# Patient Record
Sex: MALE | Race: WHITE | NOT HISPANIC OR LATINO | Employment: OTHER | URBAN - METROPOLITAN AREA
[De-identification: names, ages, dates, MRNs, and addresses within clinical notes are randomized per-mention and may not be internally consistent; named-entity substitution may affect disease eponyms.]

---

## 2017-03-09 ENCOUNTER — OFFICE VISIT (OUTPATIENT)
Dept: CARDIOLOGY | Facility: CLINIC | Age: 82
End: 2017-03-09
Payer: MEDICARE

## 2017-03-09 VITALS
WEIGHT: 156.31 LBS | HEART RATE: 58 BPM | SYSTOLIC BLOOD PRESSURE: 150 MMHG | HEIGHT: 65 IN | DIASTOLIC BLOOD PRESSURE: 66 MMHG | BODY MASS INDEX: 26.04 KG/M2

## 2017-03-09 DIAGNOSIS — I71.40 ABDOMINAL AORTIC ANEURYSM WITHOUT RUPTURE: ICD-10-CM

## 2017-03-09 DIAGNOSIS — I48.0 PAROXYSMAL ATRIAL FIBRILLATION: ICD-10-CM

## 2017-03-09 DIAGNOSIS — I10 HYPERTENSION, BENIGN: Primary | ICD-10-CM

## 2017-03-09 DIAGNOSIS — E78.2 MIXED HYPERLIPIDEMIA: ICD-10-CM

## 2017-03-09 DIAGNOSIS — I25.10 CORONARY ARTERY DISEASE INVOLVING NATIVE CORONARY ARTERY OF NATIVE HEART WITHOUT ANGINA PECTORIS: ICD-10-CM

## 2017-03-09 PROCEDURE — 1126F AMNT PAIN NOTED NONE PRSNT: CPT | Mod: S$GLB,,, | Performed by: INTERNAL MEDICINE

## 2017-03-09 PROCEDURE — 1159F MED LIST DOCD IN RCRD: CPT | Mod: S$GLB,,, | Performed by: INTERNAL MEDICINE

## 2017-03-09 PROCEDURE — 1157F ADVNC CARE PLAN IN RCRD: CPT | Mod: S$GLB,,, | Performed by: INTERNAL MEDICINE

## 2017-03-09 PROCEDURE — 3078F DIAST BP <80 MM HG: CPT | Mod: S$GLB,,, | Performed by: INTERNAL MEDICINE

## 2017-03-09 PROCEDURE — 3077F SYST BP >= 140 MM HG: CPT | Mod: S$GLB,,, | Performed by: INTERNAL MEDICINE

## 2017-03-09 PROCEDURE — 99999 PR PBB SHADOW E&M-EST. PATIENT-LVL III: CPT | Mod: PBBFAC,,, | Performed by: INTERNAL MEDICINE

## 2017-03-09 PROCEDURE — 1160F RVW MEDS BY RX/DR IN RCRD: CPT | Mod: S$GLB,,, | Performed by: INTERNAL MEDICINE

## 2017-03-09 PROCEDURE — 99213 OFFICE O/P EST LOW 20 MIN: CPT | Mod: S$GLB,,, | Performed by: INTERNAL MEDICINE

## 2017-03-09 PROCEDURE — 99499 UNLISTED E&M SERVICE: CPT | Mod: S$GLB,,, | Performed by: INTERNAL MEDICINE

## 2017-03-09 RX ORDER — LISINOPRIL 10 MG/1
10 TABLET ORAL DAILY
Qty: 30 TABLET | Refills: 5 | Status: SHIPPED | OUTPATIENT
Start: 2017-03-09 | End: 2017-09-09 | Stop reason: SDUPTHER

## 2017-03-11 NOTE — PROGRESS NOTES
Subjective:    Patient ID:  Nando Salguero is a 81 y.o. male who presents for follow-up of Coronary Artery Disease      HPI  Mr. Salguero is a 80 y/o gentleman with a history of HTN, HLP, CAD s/p CABG (LIMA-LAD, SVG-PDA, and occluded SVG-Diag) with multiple stents in his LAD, Diag, LCx, and SVG-PDA. His last PCI was on 5/26/14 and involved a Culotte technique for LM bifurcation ISR. He was last seen in this clinic on 1/7/16. He denies chest pain. 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 Mr. Salguero states he sleeps with one pillow under his head. He denies PND or lower extremity edema. He denies palpitations, syncope, or near syncope. He reports good medicine compliance and adherence to a heart healthy diet. He has not been exercising. In the interim since his last clinic visit he underwent a thryoidectomy. Since his thyroid surgery he reports difficulty swallowing, but no dysphagia and a cough with clear sputum production. He reports that both the cough and difficulty swallowing are resolving. He denies any stridor or hoarsness. He also reports left sided neck pain worse with turning his head to the left today.  He states he woke up with hthis pain2 days ago.  The is resolving slowly, but still severe. No associated symptoms.    Past Medical History:   Diagnosis Date    AAA (abdominal aortic aneurysm)     Arthritis     Cancer     CHF (congestive heart failure)     Chronic back pain     requiring long term pain meds.     Coronary artery disease     Encounter for blood transfusion     Hyperlipidemia     Hypertension     Paroxysmal atrial fibrillation 4/2/2014    Prostate cancer     Thyroid nodule 2/10/2015     Past Surgical History:   Procedure Laterality Date    ABDOMINAL SURGERY      BACK SURGERY      cardiac catherization Left 5/26/15    CARDIAC CATHETERIZATION  2005    CARDIAC CATHETERIZATION  1997    CARDIAC  CATHETERIZATION  1996    CARDIAC CATHETERIZATION  2012    CORONARY ANGIOPLASTY      CORONARY ARTERY BYPASS GRAFT  04/18/1995    x4    JOINT REPLACEMENT      PROSTATE SURGERY      SPINE SURGERY      THYROIDECTOMY  8/14/15     Current Outpatient Prescriptions on File Prior to Visit   Medication Sig Dispense Refill    amlodipine (NORVASC) 10 MG tablet TAKE 1 TABLET ONE TIME DAILY 90 tablet 11    aspirin (ADULT LOW DOSE ASPIRIN) 81 MG EC tablet Take 1 tablet by mouth Daily.      atorvastatin (LIPITOR) 40 MG tablet Take 1 tablet (40 mg total) by mouth once daily. 90 tablet 3    calcium carbonate (OS-GREGORY) 500 mg calcium (1,250 mg) tablet 2 tabs po TID x 1 week, then 2 tabs po BID x 1 week, then 2 tabs po q daily x  1 week, then 1 tab po q day (Patient taking differently: 1 tablet once daily. ) 161 tablet 6    duloxetine (CYMBALTA) 30 MG capsule Take 1 capsule (30 mg total) by mouth once daily. 30 capsule 11    ferrous gluconate (FERGON) 324 MG tablet TAKE ONE TABLET BY MOUTH ONCE DAILY WITH BREAKFAST 30 tablet 0    levothyroxine (SYNTHROID) 125 MCG tablet Take 1 tablet (125 mcg total) by mouth before breakfast. 90 tablet 3    lidocaine-prilocaine (EMLA) cream       lorazepam (ATIVAN) 0.5 MG tablet Take 1 tablet (0.5 mg total) by mouth every 12 (twelve) hours as needed. 60 tablet 2    nitroGLYCERIN (NITROSTAT) 0.4 MG SL tablet Place 1 tablet (0.4 mg total) under the tongue every 5 (five) minutes as needed for Chest pain. 25 tablet 3    oxycodone-acetaminophen (PERCOCET) 5-325 mg per tablet Take 1 tablet by mouth every 6 (six) hours as needed for Pain. 90 tablet 0    ranitidine (ZANTAC) 150 MG tablet Take 1 tablet (150 mg total) by mouth nightly. 90 tablet 11     No current facility-administered medications on file prior to visit.      Review of patient's allergies indicates:   Allergen Reactions    Sulfamethoxazole-trimethoprim      Other reaction(s): BRADYCARDIA     Social History   Substance Use  Topics    Smoking status: Never Smoker    Smokeless tobacco: None    Alcohol use No     Family History   Problem Relation Age of Onset    Cancer Mother     Heart attack Mother     Hypertension Mother     Cancer Father     Heart attack Father     Hypertension Father     Stroke Brother     Cancer Brother     No Known Problems Sister     No Known Problems Daughter     No Known Problems Maternal Grandmother     No Known Problems Maternal Grandfather     No Known Problems Paternal Grandmother     No Known Problems Paternal Grandfather     No Known Problems Maternal Aunt     No Known Problems Maternal Uncle     No Known Problems Paternal Aunt     No Known Problems Paternal Uncle     Anemia Neg Hx     Arrhythmia Neg Hx     Asthma Neg Hx     Clotting disorder Neg Hx     Fainting Neg Hx     Heart disease Neg Hx     Heart failure Neg Hx     Hyperlipidemia Neg Hx     Melanoma Neg Hx            Review of Systems   Constitution: Negative for decreased appetite, diaphoresis, fever, malaise/fatigue, weight gain and weight loss.   HENT: Negative for congestion, nosebleeds and sore throat.    Eyes: Negative for blurred vision, vision loss in left eye, vision loss in right eye and visual disturbance.   Cardiovascular: Negative for chest pain, claudication, dyspnea on exertion, leg swelling, near-syncope, orthopnea, palpitations, paroxysmal nocturnal dyspnea and syncope.   Respiratory: Negative for cough, hemoptysis, shortness of breath and wheezing.    Endocrine: Negative for polyuria.   Hematologic/Lymphatic: Does not bruise/bleed easily.   Skin: Negative for nail changes and rash.   Musculoskeletal: Positive for neck pain and stiffness. Negative for back pain, muscle cramps and myalgias.   Gastrointestinal: Negative for abdominal pain, change in bowel habit, diarrhea, heartburn, hematemesis, hematochezia, melena, nausea and vomiting.   Genitourinary: Negative for bladder incontinence, dysuria,  "frequency and hematuria.   Psychiatric/Behavioral: Negative for depression.   Allergic/Immunologic: Negative for hives.        Objective:  Vitals:    03/09/17 0735 03/09/17 0742   BP: (!) 152/68 (!) 150/66   Pulse: (!) 58    Weight: 70.9 kg (156 lb 4.9 oz)    Height: 5' 5" (1.651 m)          Physical Exam   Constitutional: He is oriented to person, place, and time. He appears well-developed and well-nourished.   HENT:   Head: Normocephalic and atraumatic.   Eyes: EOM are normal. Pupils are equal, round, and reactive to light.   Neck: Neck supple. No JVD present. No thyromegaly present.   Cardiovascular: Normal rate and regular rhythm.  PMI is displaced.  Exam reveals no gallop and no friction rub.    No murmur heard.  Pulses:       Carotid pulses are 2+ on the right side, and 2+ on the left side.       Radial pulses are 2+ on the right side, and 2+ on the left side.        Femoral pulses are 2+ on the right side, and 2+ on the left side.       Dorsalis pedis pulses are 2+ on the right side, and 2+ on the left side.        Posterior tibial pulses are 2+ on the right side, and 2+ on the left side.   Pulmonary/Chest: Effort normal. He has no wheezes. He has no rhonchi. He has no rales.   Abdominal: Soft. Normal appearance. He exhibits no distension. There is no hepatosplenomegaly. There is no tenderness.   Neurological: He is alert and oriented to person, place, and time. Gait normal.   Psychiatric: He has a normal mood and affect.         Assessment:       1. Hypertension, benign    2. Paroxysmal atrial fibrillation    3. Mixed hyperlipidemia    4. Coronary artery disease involving native coronary artery of native heart without angina pectoris    5. Abdominal aortic aneurysm without rupture         Plan:       1. CAD. The patient is free of angina and CHF symptoms. I counseled him on the importance of continuing EC ASA 81mg po qday and his statin.       2. HTN. BP inadequately controlled. Will start lisinopril 10mg po " qday and check BMP in 1 week      3. HLD.6/2/16 lipid profile reviewed. Continue statin; rec heart healthy diet;       4. Afib history. CHADS2 score = 3; patient is followed by Dr. Pablo in [place]. Currently not on anti-coagulation due to GI bleeding on coumadin.       5. AAA. 8/8/16 duplex demonstrated 3.31cm AAA; rec repeat scan in 1 year     6.  Left neck pain. history and exam suggest musculoskeletal origin; rec PCP follow-up if symptoms do not resolve in 2 weeks or get worse    All of the patient's questions were answered.   Follow-up in 6 months

## 2017-03-16 ENCOUNTER — LAB VISIT (OUTPATIENT)
Dept: LAB | Facility: HOSPITAL | Age: 82
End: 2017-03-16
Attending: INTERNAL MEDICINE
Payer: MEDICARE

## 2017-03-16 DIAGNOSIS — I10 HYPERTENSION, BENIGN: ICD-10-CM

## 2017-03-16 LAB
ANION GAP SERPL CALC-SCNC: 10 MMOL/L
BUN SERPL-MCNC: 35 MG/DL
CALCIUM SERPL-MCNC: 9.6 MG/DL
CHLORIDE SERPL-SCNC: 105 MMOL/L
CO2 SERPL-SCNC: 24 MMOL/L
CREAT SERPL-MCNC: 1 MG/DL
EST. GFR  (AFRICAN AMERICAN): >60 ML/MIN/1.73 M^2
EST. GFR  (NON AFRICAN AMERICAN): >60 ML/MIN/1.73 M^2
GLUCOSE SERPL-MCNC: 129 MG/DL
POTASSIUM SERPL-SCNC: 4.3 MMOL/L
SODIUM SERPL-SCNC: 139 MMOL/L

## 2017-03-16 PROCEDURE — 36415 COLL VENOUS BLD VENIPUNCTURE: CPT

## 2017-03-16 PROCEDURE — 80048 BASIC METABOLIC PNL TOTAL CA: CPT

## 2017-04-20 ENCOUNTER — OFFICE VISIT (OUTPATIENT)
Dept: INTERNAL MEDICINE | Facility: CLINIC | Age: 82
End: 2017-04-20
Payer: MEDICARE

## 2017-04-20 VITALS
SYSTOLIC BLOOD PRESSURE: 116 MMHG | WEIGHT: 151.25 LBS | BODY MASS INDEX: 25.2 KG/M2 | HEART RATE: 65 BPM | DIASTOLIC BLOOD PRESSURE: 70 MMHG | HEIGHT: 65 IN

## 2017-04-20 DIAGNOSIS — G95.89 MYELOMALACIA OF CERVICAL CORD: ICD-10-CM

## 2017-04-20 DIAGNOSIS — I20.9 CARDIAC ANGINA: ICD-10-CM

## 2017-04-20 DIAGNOSIS — M48.02 CERVICAL STENOSIS OF SPINE: ICD-10-CM

## 2017-04-20 DIAGNOSIS — M19.90 ARTHRITIS: ICD-10-CM

## 2017-04-20 DIAGNOSIS — M47.812 ARTHROPATHY OF CERVICAL FACET JOINT: ICD-10-CM

## 2017-04-20 DIAGNOSIS — G89.29 CHRONIC LOW BACK PAIN, UNSPECIFIED BACK PAIN LATERALITY, WITH SCIATICA PRESENCE UNSPECIFIED: Primary | ICD-10-CM

## 2017-04-20 DIAGNOSIS — M54.5 CHRONIC LOW BACK PAIN, UNSPECIFIED BACK PAIN LATERALITY, WITH SCIATICA PRESENCE UNSPECIFIED: Primary | ICD-10-CM

## 2017-04-20 PROCEDURE — 3074F SYST BP LT 130 MM HG: CPT | Mod: S$GLB,,, | Performed by: INTERNAL MEDICINE

## 2017-04-20 PROCEDURE — 99213 OFFICE O/P EST LOW 20 MIN: CPT | Mod: S$GLB,,, | Performed by: INTERNAL MEDICINE

## 2017-04-20 PROCEDURE — 1126F AMNT PAIN NOTED NONE PRSNT: CPT | Mod: S$GLB,,, | Performed by: INTERNAL MEDICINE

## 2017-04-20 PROCEDURE — 1159F MED LIST DOCD IN RCRD: CPT | Mod: S$GLB,,, | Performed by: INTERNAL MEDICINE

## 2017-04-20 PROCEDURE — 1157F ADVNC CARE PLAN IN RCRD: CPT | Mod: S$GLB,,, | Performed by: INTERNAL MEDICINE

## 2017-04-20 PROCEDURE — 99999 PR PBB SHADOW E&M-EST. PATIENT-LVL III: CPT | Mod: PBBFAC,,, | Performed by: INTERNAL MEDICINE

## 2017-04-20 PROCEDURE — 1160F RVW MEDS BY RX/DR IN RCRD: CPT | Mod: S$GLB,,, | Performed by: INTERNAL MEDICINE

## 2017-04-20 PROCEDURE — 3078F DIAST BP <80 MM HG: CPT | Mod: S$GLB,,, | Performed by: INTERNAL MEDICINE

## 2017-04-20 PROCEDURE — 99499 UNLISTED E&M SERVICE: CPT | Mod: S$GLB,,, | Performed by: INTERNAL MEDICINE

## 2017-04-20 RX ORDER — OXYCODONE AND ACETAMINOPHEN 5; 325 MG/1; MG/1
1 TABLET ORAL EVERY 6 HOURS PRN
Qty: 90 TABLET | Refills: 0 | Status: SHIPPED | OUTPATIENT
Start: 2017-04-20 | End: 2017-06-20 | Stop reason: SDUPTHER

## 2017-04-20 NOTE — MR AVS SNAPSHOT
[facility]  [address]  [facility] [address]  Phone: [phone]  Fax: [phone]                  Nando Salguero   2017 11:00 AM   Office Visit    Description:  Male : 1935   Provider:  Rogelio Oliver MD   Department:  [facility]           Reason for Visit     Medication Refill           Diagnoses this Visit        Comments    Chronic low back pain, unspecified back pain laterality, with sciatica presence unspecified    -  Primary     Arthropathy of cervical facet joint         Arthritis         Cervical stenosis of spine         Cardiac angina     No recent episodes. Saw Cardiology recently. Meds adjusted.     Myelomalacia of cervical cord     Continue meds. Clinically stable.            To Do List           Future Appointments        Provider Department Dept Phone    2017 11:15 AM Rogelio Oliver MD [facility] [phone]      Goals (5 Years of Data)     None       These Medications        Disp Refills Start End    oxycodone-acetaminophen (PERCOCET) 5-325 mg per tablet 90 tablet 0 2017     Take 1 tablet by mouth every 6 (six) hours as needed for Pain. - Oral    Pharmacy: [facility] [address] [place] [address] Ph #: [phone]         [facility] On Call     [facility] On Call Nurse Care Line - 24 Assistance  Unless otherwise directed by your provider, please contact [facility], our nurse care line that is available for / assistance.     Registered nurses in the [facility] Center provide: appointment scheduling, clinical advisement, health education, and other advisory services.  Call: [phone] (toll free)               Medications           Message regarding Medications     Verify the changes and/or additions to your medication regime listed below are the same as discussed with your clinician today.  If any of these changes or additions are incorrect, please  "notify your healthcare provider.        STOP taking these medications     lorazepam (ATIVAN) 0.5 MG tablet Take 1 tablet (0.5 mg total) by mouth every 12 (twelve) hours as needed.           Verify that the below list of medications is an accurate representation of the medications you are currently taking.  If none reported, the list may be blank. If incorrect, please contact your healthcare provider. Carry this list with you in case of emergency.           Current Medications     amlodipine (NORVASC) 10 MG tablet TAKE 1 TABLET ONE TIME DAILY    aspirin (ADULT LOW DOSE ASPIRIN) 81 MG EC tablet Take 1 tablet by mouth Daily.    atorvastatin (LIPITOR) 40 MG tablet Take 1 tablet (40 mg total) by mouth once daily.    calcium carbonate (OS-GREGORY) 500 mg calcium (1,250 mg) tablet 2 tabs po TID x 1 week, then 2 tabs po BID x 1 week, then 2 tabs po q daily x  1 week, then 1 tab po q day    duloxetine (CYMBALTA) 30 MG capsule Take 1 capsule (30 mg total) by mouth once daily.    ferrous gluconate (FERGON) 324 MG tablet TAKE ONE TABLET BY MOUTH ONCE DAILY WITH BREAKFAST    levothyroxine (SYNTHROID) 125 MCG tablet Take 1 tablet (125 mcg total) by mouth before breakfast.    lidocaine-prilocaine (EMLA) cream     lisinopril 10 MG tablet Take 1 tablet (10 mg total) by mouth once daily.    nitroGLYCERIN (NITROSTAT) 0.4 MG SL tablet Place 1 tablet (0.4 mg total) under the tongue every 5 (five) minutes as needed for Chest pain.    oxycodone-acetaminophen (PERCOCET) 5-325 mg per tablet Take 1 tablet by mouth every 6 (six) hours as needed for Pain.    ranitidine (ZANTAC) 150 MG tablet Take 1 tablet (150 mg total) by mouth nightly.           Clinical Reference Information           Your Vitals Were     BP Pulse Height Weight BMI    116/70 65 5' 5" (1.651 m) 68.6 kg (151 lb 3.8 oz) 25.17 kg/m2      Blood Pressure          Most Recent Value    BP  116/70      Allergies as of 4/20/2017     Sulfamethoxazole-trimethoprim      Immunizations " Administered on Date of Encounter - 4/20/2017     None      Language Assistance Services     ATTENTION: Language assistance services are available, free of charge. Please call [phone].      ATENCIÓN: Si habla viviana, tiene a mora disposición servicios gratuitos de asistencia lingüística. Llame al [phone].     CHÚ Ý: N?u b?n nói Ti?ng Vi?t, có các d?ch v? h? tr? ngôn ng? mi?n phí dành cho b?n. G?i s? [phone].         [facility] complies with applicable Federal civil rights laws and does not discriminate on the basis of race, color, national origin, age, disability, or sex.

## 2017-04-20 NOTE — PROGRESS NOTES
Subjective:       Patient ID: Nando Salguero is a 82 y.o. male.    Chief Complaint: Medication Refill    HPI Comments: Patient with significant back and neck arthritis and chronic pain comes in for follow-up of pain meds.  He actually came on the wrong day but was hoping for refill.  His last prescription was in January.  He denies any chest pain shortness of breath fevers or chills.  He sees cardiology for heart disease.  I will update his Percocet prescription.  He says he has been taking about one per day.  Against side effects discussed and risk to completed.    Medication Refill   Associated symptoms include neck pain. Pertinent negatives include no abdominal pain, chest pain, chills, coughing, fatigue, fever, headaches, nausea, rash or vomiting.     Review of Systems   Constitutional: Negative for chills, fatigue, fever and unexpected weight change.   HENT: Negative for trouble swallowing.    Eyes: Negative for visual disturbance.   Respiratory: Negative for cough, shortness of breath and wheezing.    Cardiovascular: Negative for chest pain and palpitations.   Gastrointestinal: Negative for abdominal pain, constipation, diarrhea, nausea and vomiting.   Genitourinary: Negative for difficulty urinating.   Musculoskeletal: Positive for back pain, neck pain and neck stiffness.   Skin: Negative for rash.   Neurological: Negative for dizziness and headaches.       Objective:      Physical Exam   Constitutional: He is oriented to person, place, and time. He appears well-developed and well-nourished. No distress.   HENT:   Head: Normocephalic and atraumatic.   Mouth/Throat: No oropharyngeal exudate.   TM's clear, pharynx clear   Eyes: Conjunctivae and EOM are normal. Pupils are equal, round, and reactive to light. No scleral icterus.   Neck: No thyromegaly present.   No supraclavicular nodes palpated   Cardiovascular: Normal rate, regular rhythm and normal heart sounds.    No murmur heard.  Pulmonary/Chest: Effort  normal and breath sounds normal. He has no wheezes.   Abdominal: Soft. Bowel sounds are normal. He exhibits no mass. There is no tenderness.   Musculoskeletal: He exhibits tenderness (neck and low back). He exhibits no edema.   Lymphadenopathy:     He has no cervical adenopathy.   Neurological: He is alert and oriented to person, place, and time.   Skin: No pallor.   Psychiatric: He has a normal mood and affect.       Assessment:       1. Chronic low back pain, unspecified back pain laterality, with sciatica presence unspecified    2. Arthropathy of cervical facet joint    3. Arthritis    4. Cervical stenosis of spine    5. Cardiac angina    6. Myelomalacia of cervical cord        Plan:       Nando was seen today for medication refill.    Diagnoses and all orders for this visit:    Chronic low back pain, unspecified back pain laterality, with sciatica presence unspecified    Arthropathy of cervical facet joint    Arthritis    Cervical stenosis of spine    Cardiac angina  Comments:  No recent episodes. Saw Cardiology recently. Meds adjusted.     Myelomalacia of cervical cord  Comments:  Continue meds. Clinically stable.     Other orders  -     oxycodone-acetaminophen (PERCOCET) 5-325 mg per tablet; Take 1 tablet by mouth every 6 (six) hours as needed for Pain.

## 2017-05-18 ENCOUNTER — TELEPHONE (OUTPATIENT)
Dept: INTERNAL MEDICINE | Facility: CLINIC | Age: 82
End: 2017-05-18

## 2017-05-18 DIAGNOSIS — I10 HYPERTENSION, BENIGN: Primary | ICD-10-CM

## 2017-05-18 DIAGNOSIS — E78.2 MIXED HYPERLIPIDEMIA: ICD-10-CM

## 2017-05-18 DIAGNOSIS — M19.90 ARTHRITIS: ICD-10-CM

## 2017-05-18 DIAGNOSIS — E89.0 POSTOPERATIVE HYPOTHYROIDISM: ICD-10-CM

## 2017-05-18 DIAGNOSIS — I25.10 CORONARY ARTERY DISEASE INVOLVING NATIVE CORONARY ARTERY OF NATIVE HEART WITHOUT ANGINA PECTORIS: ICD-10-CM

## 2017-06-05 RX ORDER — LEVOTHYROXINE SODIUM 125 UG/1
TABLET ORAL
Qty: 90 TABLET | Refills: 0 | Status: SHIPPED | OUTPATIENT
Start: 2017-06-05 | End: 2017-09-08 | Stop reason: SDUPTHER

## 2017-06-14 ENCOUNTER — LAB VISIT (OUTPATIENT)
Dept: LAB | Facility: HOSPITAL | Age: 82
End: 2017-06-14
Attending: INTERNAL MEDICINE
Payer: MEDICARE

## 2017-06-14 DIAGNOSIS — I25.10 CORONARY ARTERY DISEASE INVOLVING NATIVE CORONARY ARTERY OF NATIVE HEART WITHOUT ANGINA PECTORIS: ICD-10-CM

## 2017-06-14 DIAGNOSIS — E89.0 POSTOPERATIVE HYPOTHYROIDISM: ICD-10-CM

## 2017-06-14 DIAGNOSIS — M19.90 ARTHRITIS: ICD-10-CM

## 2017-06-14 DIAGNOSIS — I10 HYPERTENSION, BENIGN: ICD-10-CM

## 2017-06-14 DIAGNOSIS — E78.2 MIXED HYPERLIPIDEMIA: ICD-10-CM

## 2017-06-14 LAB
ALBUMIN SERPL BCP-MCNC: 3.6 G/DL
ALP SERPL-CCNC: 86 U/L
ALT SERPL W/O P-5'-P-CCNC: 11 U/L
ANION GAP SERPL CALC-SCNC: 6 MMOL/L
AST SERPL-CCNC: 14 U/L
BASOPHILS # BLD AUTO: 0.03 K/UL
BASOPHILS NFR BLD: 0.4 %
BILIRUB SERPL-MCNC: 0.7 MG/DL
BUN SERPL-MCNC: 24 MG/DL
CALCIUM SERPL-MCNC: 9.7 MG/DL
CHLORIDE SERPL-SCNC: 105 MMOL/L
CHOLEST/HDLC SERPL: 3.2 {RATIO}
CO2 SERPL-SCNC: 29 MMOL/L
CREAT SERPL-MCNC: 0.9 MG/DL
DIFFERENTIAL METHOD: ABNORMAL
EOSINOPHIL # BLD AUTO: 0.2 K/UL
EOSINOPHIL NFR BLD: 3 %
ERYTHROCYTE [DISTWIDTH] IN BLOOD BY AUTOMATED COUNT: 14.4 %
EST. GFR  (AFRICAN AMERICAN): >60 ML/MIN/1.73 M^2
EST. GFR  (NON AFRICAN AMERICAN): >60 ML/MIN/1.73 M^2
GLUCOSE SERPL-MCNC: 107 MG/DL
HCT VFR BLD AUTO: 38 %
HDL/CHOLESTEROL RATIO: 31.3 %
HDLC SERPL-MCNC: 30 MG/DL
HDLC SERPL-MCNC: 96 MG/DL
HGB BLD-MCNC: 12.1 G/DL
LDLC SERPL CALC-MCNC: 56.4 MG/DL
LYMPHOCYTES # BLD AUTO: 1.5 K/UL
LYMPHOCYTES NFR BLD: 20 %
MCH RBC QN AUTO: 27.9 PG
MCHC RBC AUTO-ENTMCNC: 31.8 %
MCV RBC AUTO: 88 FL
MONOCYTES # BLD AUTO: 0.9 K/UL
MONOCYTES NFR BLD: 12 %
NEUTROPHILS # BLD AUTO: 4.9 K/UL
NEUTROPHILS NFR BLD: 64.6 %
NONHDLC SERPL-MCNC: 66 MG/DL
PLATELET # BLD AUTO: 226 K/UL
PMV BLD AUTO: 10.2 FL
POTASSIUM SERPL-SCNC: 5.2 MMOL/L
PROT SERPL-MCNC: 6.9 G/DL
RBC # BLD AUTO: 4.33 M/UL
SODIUM SERPL-SCNC: 140 MMOL/L
TRIGL SERPL-MCNC: 48 MG/DL
TSH SERPL DL<=0.005 MIU/L-ACNC: 2.5 UIU/ML
WBC # BLD AUTO: 7.64 K/UL

## 2017-06-14 PROCEDURE — 84443 ASSAY THYROID STIM HORMONE: CPT

## 2017-06-14 PROCEDURE — 80061 LIPID PANEL: CPT

## 2017-06-14 PROCEDURE — 80053 COMPREHEN METABOLIC PANEL: CPT

## 2017-06-14 PROCEDURE — 36415 COLL VENOUS BLD VENIPUNCTURE: CPT

## 2017-06-14 PROCEDURE — 85025 COMPLETE CBC W/AUTO DIFF WBC: CPT

## 2017-06-20 ENCOUNTER — OFFICE VISIT (OUTPATIENT)
Dept: INTERNAL MEDICINE | Facility: CLINIC | Age: 82
End: 2017-06-20
Payer: MEDICARE

## 2017-06-20 VITALS
HEART RATE: 74 BPM | WEIGHT: 150.38 LBS | HEIGHT: 65 IN | SYSTOLIC BLOOD PRESSURE: 114 MMHG | BODY MASS INDEX: 25.05 KG/M2 | DIASTOLIC BLOOD PRESSURE: 70 MMHG

## 2017-06-20 DIAGNOSIS — M48.02 CERVICAL STENOSIS OF SPINE: ICD-10-CM

## 2017-06-20 DIAGNOSIS — G89.29 CHRONIC LOW BACK PAIN, UNSPECIFIED BACK PAIN LATERALITY, WITH SCIATICA PRESENCE UNSPECIFIED: ICD-10-CM

## 2017-06-20 DIAGNOSIS — M54.5 CHRONIC LOW BACK PAIN, UNSPECIFIED BACK PAIN LATERALITY, WITH SCIATICA PRESENCE UNSPECIFIED: ICD-10-CM

## 2017-06-20 DIAGNOSIS — Z00.00 ROUTINE PHYSICAL EXAMINATION: Primary | ICD-10-CM

## 2017-06-20 DIAGNOSIS — Z23 NEED FOR PROPHYLACTIC VACCINATION WITH STREPTOCOCCUS PNEUMONIAE (PNEUMOCOCCUS) AND INFLUENZA VACCINES: ICD-10-CM

## 2017-06-20 DIAGNOSIS — M47.812 ARTHROPATHY OF CERVICAL FACET JOINT: ICD-10-CM

## 2017-06-20 PROCEDURE — 90732 PPSV23 VACC 2 YRS+ SUBQ/IM: CPT | Mod: S$GLB,,, | Performed by: INTERNAL MEDICINE

## 2017-06-20 PROCEDURE — 99397 PER PM REEVAL EST PAT 65+ YR: CPT | Mod: S$GLB,,, | Performed by: INTERNAL MEDICINE

## 2017-06-20 PROCEDURE — 99999 PR PBB SHADOW E&M-EST. PATIENT-LVL III: CPT | Mod: PBBFAC,,, | Performed by: INTERNAL MEDICINE

## 2017-06-20 PROCEDURE — G0009 ADMIN PNEUMOCOCCAL VACCINE: HCPCS | Mod: S$GLB,,, | Performed by: INTERNAL MEDICINE

## 2017-06-20 PROCEDURE — 99499 UNLISTED E&M SERVICE: CPT | Mod: S$GLB,,, | Performed by: INTERNAL MEDICINE

## 2017-06-20 RX ORDER — OXYCODONE AND ACETAMINOPHEN 5; 325 MG/1; MG/1
1 TABLET ORAL EVERY 6 HOURS PRN
Qty: 90 TABLET | Refills: 0 | Status: SHIPPED | OUTPATIENT
Start: 2017-06-20 | End: 2017-06-20 | Stop reason: SDUPTHER

## 2017-06-20 RX ORDER — OXYCODONE AND ACETAMINOPHEN 5; 325 MG/1; MG/1
1 TABLET ORAL EVERY 6 HOURS PRN
Qty: 90 TABLET | Refills: 0 | Status: SHIPPED | OUTPATIENT
Start: 2017-07-20 | End: 2017-11-09 | Stop reason: SDUPTHER

## 2017-06-20 NOTE — PROGRESS NOTES
Subjective:       Patient ID: Nando Salguero is a 82 y.o. male.    Chief Complaint: Annual Exam    History of present illness: Patient comes in for annual exam and refill of chronic pain meds.  Patient has significant spine disease including neck and low back.  He has been on pain meds for years.  He denies any side effects, habit-forming nature of this last medication and need for monitoring.  He expressed understanding.  Labs were reviewed in detail with the patient and he expressed understanding about the results.  I would like to update a pneumonia vaccine.  We talked about the need for regular exercise to fully improve the chronic pain in the neck back and extremities.  He says it is tough to do them at his age.      Review of Systems   Constitutional: Negative for chills, fatigue, fever and unexpected weight change.   HENT: Negative for trouble swallowing.    Eyes: Negative for visual disturbance.   Respiratory: Negative for cough, shortness of breath and wheezing.    Cardiovascular: Negative for chest pain and palpitations.   Gastrointestinal: Negative for abdominal pain, constipation, diarrhea, nausea and vomiting.   Genitourinary: Negative for difficulty urinating.   Musculoskeletal: Positive for back pain, neck pain and neck stiffness.   Skin: Negative for rash.   Neurological: Negative for dizziness and headaches.       Objective:      Physical Exam   Constitutional: He is oriented to person, place, and time. He appears well-developed and well-nourished. No distress.   HENT:   Head: Normocephalic and atraumatic.   Right Ear: External ear normal.   Left Ear: External ear normal.   Mouth/Throat: Oropharynx is clear and moist. No oropharyngeal exudate.   TM's clear, pharynx clear   Eyes: Conjunctivae and EOM are normal. Pupils are equal, round, and reactive to light. No scleral icterus.   Neck: Normal range of motion. Neck supple. No thyromegaly present.   No supraclavicular nodes palpated    Cardiovascular: Normal rate, regular rhythm and normal heart sounds.    No murmur heard.  Pulmonary/Chest: Effort normal and breath sounds normal. He has no wheezes.   Abdominal: Soft. Bowel sounds are normal. He exhibits no mass. There is no tenderness.   Musculoskeletal: He exhibits tenderness and deformity (hand arthritis). He exhibits no edema.   Lymphadenopathy:     He has no cervical adenopathy.   Neurological: He is alert and oriented to person, place, and time.   Skin: No pallor.   Psychiatric: He has a normal mood and affect.       Assessment:       1. Routine physical examination    2. Arthropathy of cervical facet joint    3. Cervical stenosis of spine    4. Chronic low back pain, unspecified back pain laterality, with sciatica presence unspecified    5. Need for prophylactic vaccination with Streptococcus pneumoniae (Pneumococcus) and Influenza vaccines        Plan:       Nando was seen today for annual exam.    Diagnoses and all orders for this visit:    Routine physical examination    Arthropathy of cervical facet joint    Cervical stenosis of spine    Chronic low back pain, unspecified back pain laterality, with sciatica presence unspecified    Need for prophylactic vaccination with Streptococcus pneumoniae (Pneumococcus) and Influenza vaccines  -     Pneumococcal Polysaccharide Vaccine (23 Valent) (SQ/IM)    Other orders  -     Discontinue: oxycodone-acetaminophen (PERCOCET) 5-325 mg per tablet; Take 1 tablet by mouth every 6 (six) hours as needed for Pain.  -     oxycodone-acetaminophen (PERCOCET) 5-325 mg per tablet; Take 1 tablet by mouth every 6 (six) hours as needed for Pain.

## 2017-08-17 ENCOUNTER — TELEPHONE (OUTPATIENT)
Dept: INTERNAL MEDICINE | Facility: CLINIC | Age: 82
End: 2017-08-17

## 2017-09-06 ENCOUNTER — TELEPHONE (OUTPATIENT)
Dept: INTERNAL MEDICINE | Facility: CLINIC | Age: 82
End: 2017-09-06

## 2017-09-08 ENCOUNTER — LAB VISIT (OUTPATIENT)
Dept: LAB | Facility: HOSPITAL | Age: 82
End: 2017-09-08
Attending: INTERNAL MEDICINE
Payer: MEDICARE

## 2017-09-08 ENCOUNTER — OFFICE VISIT (OUTPATIENT)
Dept: INTERNAL MEDICINE | Facility: CLINIC | Age: 82
End: 2017-09-08
Payer: MEDICARE

## 2017-09-08 VITALS
HEART RATE: 89 BPM | WEIGHT: 148.13 LBS | SYSTOLIC BLOOD PRESSURE: 124 MMHG | HEIGHT: 65 IN | BODY MASS INDEX: 24.68 KG/M2 | DIASTOLIC BLOOD PRESSURE: 72 MMHG

## 2017-09-08 DIAGNOSIS — H26.9 CATARACT, UNSPECIFIED CATARACT TYPE, UNSPECIFIED LATERALITY: ICD-10-CM

## 2017-09-08 DIAGNOSIS — Z01.810 PREOP CARDIOVASCULAR EXAM: Primary | ICD-10-CM

## 2017-09-08 DIAGNOSIS — I10 HYPERTENSION, BENIGN: ICD-10-CM

## 2017-09-08 DIAGNOSIS — I25.10 CORONARY ARTERY DISEASE INVOLVING NATIVE CORONARY ARTERY OF NATIVE HEART WITHOUT ANGINA PECTORIS: ICD-10-CM

## 2017-09-08 DIAGNOSIS — E89.0 POSTOPERATIVE HYPOTHYROIDISM: ICD-10-CM

## 2017-09-08 LAB
ANION GAP SERPL CALC-SCNC: 11 MMOL/L
BUN SERPL-MCNC: 21 MG/DL
CALCIUM SERPL-MCNC: 9.7 MG/DL
CHLORIDE SERPL-SCNC: 105 MMOL/L
CO2 SERPL-SCNC: 25 MMOL/L
CREAT SERPL-MCNC: 0.9 MG/DL
EST. GFR  (AFRICAN AMERICAN): >60 ML/MIN/1.73 M^2
EST. GFR  (NON AFRICAN AMERICAN): >60 ML/MIN/1.73 M^2
GLUCOSE SERPL-MCNC: 98 MG/DL
POTASSIUM SERPL-SCNC: 4.6 MMOL/L
SODIUM SERPL-SCNC: 141 MMOL/L

## 2017-09-08 PROCEDURE — 1159F MED LIST DOCD IN RCRD: CPT | Mod: S$GLB,,, | Performed by: INTERNAL MEDICINE

## 2017-09-08 PROCEDURE — 99999 PR PBB SHADOW E&M-EST. PATIENT-LVL III: CPT | Mod: PBBFAC,,, | Performed by: INTERNAL MEDICINE

## 2017-09-08 PROCEDURE — 3078F DIAST BP <80 MM HG: CPT | Mod: S$GLB,,, | Performed by: INTERNAL MEDICINE

## 2017-09-08 PROCEDURE — 80048 BASIC METABOLIC PNL TOTAL CA: CPT

## 2017-09-08 PROCEDURE — 3074F SYST BP LT 130 MM HG: CPT | Mod: S$GLB,,, | Performed by: INTERNAL MEDICINE

## 2017-09-08 PROCEDURE — 99214 OFFICE O/P EST MOD 30 MIN: CPT | Mod: S$GLB,,, | Performed by: INTERNAL MEDICINE

## 2017-09-08 PROCEDURE — 99499 UNLISTED E&M SERVICE: CPT | Mod: S$GLB,,, | Performed by: INTERNAL MEDICINE

## 2017-09-08 PROCEDURE — 1126F AMNT PAIN NOTED NONE PRSNT: CPT | Mod: S$GLB,,, | Performed by: INTERNAL MEDICINE

## 2017-09-08 PROCEDURE — 36415 COLL VENOUS BLD VENIPUNCTURE: CPT

## 2017-09-08 PROCEDURE — 3008F BODY MASS INDEX DOCD: CPT | Mod: S$GLB,,, | Performed by: INTERNAL MEDICINE

## 2017-09-08 RX ORDER — AMLODIPINE BESYLATE 10 MG/1
TABLET ORAL
Qty: 90 TABLET | Refills: 11 | Status: SHIPPED | OUTPATIENT
Start: 2017-09-08 | End: 2017-11-29 | Stop reason: SDUPTHER

## 2017-09-08 RX ORDER — ATORVASTATIN CALCIUM 40 MG/1
40 TABLET, FILM COATED ORAL DAILY
Qty: 90 TABLET | Refills: 3 | Status: SHIPPED | OUTPATIENT
Start: 2017-09-08 | End: 2018-09-08

## 2017-09-08 RX ORDER — DULOXETIN HYDROCHLORIDE 30 MG/1
30 CAPSULE, DELAYED RELEASE ORAL DAILY
Qty: 30 CAPSULE | Refills: 11 | Status: SHIPPED | OUTPATIENT
Start: 2017-09-08 | End: 2018-10-11 | Stop reason: SDUPTHER

## 2017-09-08 RX ORDER — LEVOTHYROXINE SODIUM 125 UG/1
TABLET ORAL
Qty: 90 TABLET | Refills: 4 | Status: SHIPPED | OUTPATIENT
Start: 2017-09-08 | End: 2018-09-13 | Stop reason: SDUPTHER

## 2017-09-08 NOTE — PROGRESS NOTES
Subjective:       Patient ID: Nando Salguero is a 82 y.o. male.    Chief Complaint: Pre-op Exam    Patient with history of hypertension, cervical spinal stenosis, hypothyroidism, chronic back and neck pain comes in for follow-up for pre-op consultation in preparation for eye surgery he suspects he will need to get both eyes done.  He needed a few prescriptions updated.  Told he did not have to hold his aspirin.  He denies any abnormal bleeding bruising or clotting.  He has not had problems with anesthesia in the past.  No troubles urinating.  He denies any GI or  complaints.      Review of Systems   Constitutional: Negative for chills, fatigue, fever and unexpected weight change.   HENT: Negative for trouble swallowing.    Eyes: Positive for visual disturbance.   Respiratory: Negative for cough, shortness of breath and wheezing.    Cardiovascular: Negative for chest pain and palpitations.   Gastrointestinal: Negative for abdominal pain, constipation, diarrhea, nausea and vomiting.   Genitourinary: Negative for difficulty urinating.   Musculoskeletal: Positive for arthralgias, back pain, neck pain and neck stiffness.   Skin: Negative for rash.   Neurological: Negative for dizziness and headaches.       Objective:      Physical Exam   Constitutional: He is oriented to person, place, and time. He appears well-developed and well-nourished. No distress.   HENT:   Head: Normocephalic and atraumatic.   Right Ear: External ear normal.   Left Ear: External ear normal.   Mouth/Throat: Oropharynx is clear and moist. No oropharyngeal exudate.   TM's clear, pharynx clear   Eyes: Conjunctivae and EOM are normal. Pupils are equal, round, and reactive to light. No scleral icterus.   Neck: Neck supple. No thyromegaly present.   No supraclavicular nodes palpated   Cardiovascular: Normal rate, regular rhythm and normal heart sounds.    No murmur heard.  Pulmonary/Chest: Effort normal and breath sounds normal. He has no wheezes.    Abdominal: Soft. Bowel sounds are normal. He exhibits no mass. There is no tenderness.   Musculoskeletal: He exhibits tenderness (Neck, low back). He exhibits no edema.   Lymphadenopathy:     He has no cervical adenopathy.   Neurological: He is alert and oriented to person, place, and time.   Skin: No pallor.   Psychiatric: He has a normal mood and affect.       Assessment:       1. Preop cardiovascular exam    2. Hypertension, benign    3. Coronary artery disease involving native coronary artery of native heart without angina pectoris    4. Postoperative hypothyroidism    5. Cataract, unspecified cataract type, unspecified laterality        Plan:       Nando was seen today for pre-op exam.    Diagnoses and all orders for this visit:    Preop cardiovascular exam    Hypertension, benign  -     Basic metabolic panel; Future    Coronary artery disease involving native coronary artery of native heart without angina pectoris    Postoperative hypothyroidism    Cataract, unspecified cataract type, unspecified laterality    Other orders  -     levothyroxine (SYNTHROID) 125 MCG tablet; TAKE ONE TABLET BY MOUTH IN THE MORNING BEFORE BREAKFAST  -     duloxetine (CYMBALTA) 30 MG capsule; Take 1 capsule (30 mg total) by mouth once daily.  -     atorvastatin (LIPITOR) 40 MG tablet; Take 1 tablet (40 mg total) by mouth once daily.  -     amlodipine (NORVASC) 10 MG tablet; TAKE 1 TABLET ONE TIME DAILY        Since his last potassium was borderline and I would like to repeat that before giving final clearance.  As soon as I get his lab I will send a completed preop form.

## 2017-09-09 DIAGNOSIS — I10 HYPERTENSION, BENIGN: ICD-10-CM

## 2017-09-11 RX ORDER — LISINOPRIL 10 MG/1
TABLET ORAL
Qty: 30 TABLET | Refills: 5 | Status: SHIPPED | OUTPATIENT
Start: 2017-09-11 | End: 2018-10-11 | Stop reason: SDUPTHER

## 2017-10-02 ENCOUNTER — TELEPHONE (OUTPATIENT)
Dept: INTERNAL MEDICINE | Facility: CLINIC | Age: 82
End: 2017-10-02

## 2017-10-02 NOTE — TELEPHONE ENCOUNTER
----- Message from Teodora Huffman sent at 9/29/2017  9:40 AM CDT -----  Contact: [facility] [phone] [place]   They need to know if patient has had a recent Chest xray and Ekg? Patient is in the office.

## 2017-10-03 ENCOUNTER — TELEPHONE (OUTPATIENT)
Dept: CARDIOLOGY | Facility: CLINIC | Age: 82
End: 2017-10-03

## 2017-10-03 ENCOUNTER — DOCUMENTATION ONLY (OUTPATIENT)
Dept: CARDIOLOGY | Facility: CLINIC | Age: 82
End: 2017-10-03

## 2017-10-03 NOTE — PROGRESS NOTES
Mr. Salguero is an 82 year old gentleman with a history of HTN, hyperlipidemia, CAD s/p CABG (LIMA-LAD, SVG-PDA, and occluded SVG-Diag) with multiple stents in his LAD, Diag, LCx, and SVG-PDA. His last PCI was on 5/26/14 and involved a Culotte technique for LM bifurcation in-stent restenosis. He was last seen in this clinic on 3/9/17. At that time he denied angina and heart failure symptoms.  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  If he is currently asymptomatic when performing this level of activity, then he will be in the low to moderate risk for cardiac complications during cataract surgery and may proceed with cataract surgery without further cardiac testing. I have attempted to call the patient twice today to assess his symptoms, but the calls went to voicemail. Will try again tomorrow.

## 2017-10-04 ENCOUNTER — DOCUMENTATION ONLY (OUTPATIENT)
Dept: CARDIOLOGY | Facility: CLINIC | Age: 82
End: 2017-10-04

## 2017-10-04 NOTE — PROGRESS NOTES
Mr. Salguero is an 82 year old gentleman with a history of HTN, hyperlipidemia, CAD s/p CABG (LIMA-LAD, SVG-PDA, and occluded SVG-Diag) with multiple stents in his LAD, Diag, LCx, and SVG-PDA. His last PCI was on 5/26/14 and involved a Culotte technique for LM bifurcation in-stent restenosis. He was last seen in this clinic on 3/9/17. At that time he denied angina and heart failure symptoms.  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 I spoek to the patient via telephone today.  He reported that he has been walking 1 mile for exercise approximately one a week without symptoms. 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  He denies lower extremity edema, orthopnea, or PND. He denied palpitations, syncope or near syncope.  His EKG from [facility] dated 9/29/17 demonstrates atrial fibrillation with a ventricular rate of 87 bpm. He has a known hisotry of paroxysmal atrial fibrillation, but has not been on coumadin due to Gi bleeding in the past.    Mr. Salguero is in the low to moderate risk for cardiac complications during cataract surgery and may proceed with cataract surgery without further cardiac testing.  All of the patient's questions were answered.

## 2017-10-09 RX ORDER — ATORVASTATIN CALCIUM 40 MG/1
TABLET, FILM COATED ORAL
Qty: 90 TABLET | Refills: 3 | Status: SHIPPED | OUTPATIENT
Start: 2017-10-09 | End: 2018-10-11 | Stop reason: SDUPTHER

## 2017-11-09 RX ORDER — OXYCODONE AND ACETAMINOPHEN 5; 325 MG/1; MG/1
1 TABLET ORAL EVERY 6 HOURS PRN
Qty: 90 TABLET | Refills: 0 | Status: SHIPPED | OUTPATIENT
Start: 2017-11-09 | End: 2018-02-22 | Stop reason: SDUPTHER

## 2017-11-09 NOTE — TELEPHONE ENCOUNTER
----- Message from Nadiya Luong sent at 11/9/2017  4:14 PM CST -----  Contact: Self  Pt is calling to speak with Staff regarding a request for a prescription refill of oxycodone-acetaminophen (PERCOCET) 5-325 mg per tablet.    He can be reached at [phone].    Thank you.

## 2017-11-10 NOTE — TELEPHONE ENCOUNTER
----- Message from Jazlyn Richmond sent at 11/10/2017  9:53 AM CST -----  Contact: self  Patient states need refill on medication Percocet called into [facility].   please call pt to let know done [phone]

## 2017-11-10 NOTE — TELEPHONE ENCOUNTER
----- Message from Jazlyn Richmond sent at 11/10/2017  9:53 AM CST -----  Contact: self  Patient states need refill on medication Percocet called [facility].   please call pt to let know done [phone]

## 2017-12-05 RX ORDER — AMLODIPINE BESYLATE 10 MG/1
TABLET ORAL
Qty: 90 TABLET | Refills: 11 | Status: SHIPPED | OUTPATIENT
Start: 2017-12-05 | End: 2018-10-11 | Stop reason: SDUPTHER

## 2018-02-02 ENCOUNTER — PES CALL (OUTPATIENT)
Dept: ADMINISTRATIVE | Facility: CLINIC | Age: 83
End: 2018-02-02

## 2018-02-22 ENCOUNTER — OFFICE VISIT (OUTPATIENT)
Dept: INTERNAL MEDICINE | Facility: CLINIC | Age: 83
End: 2018-02-22
Payer: MEDICARE

## 2018-02-22 VITALS
DIASTOLIC BLOOD PRESSURE: 68 MMHG | HEART RATE: 74 BPM | BODY MASS INDEX: 24.36 KG/M2 | SYSTOLIC BLOOD PRESSURE: 128 MMHG | OXYGEN SATURATION: 99 % | WEIGHT: 146.38 LBS

## 2018-02-22 DIAGNOSIS — M47.812 ARTHROPATHY OF CERVICAL FACET JOINT: ICD-10-CM

## 2018-02-22 DIAGNOSIS — M48.02 CERVICAL STENOSIS OF SPINE: Primary | ICD-10-CM

## 2018-02-22 DIAGNOSIS — I25.10 CORONARY ARTERY DISEASE INVOLVING NATIVE CORONARY ARTERY OF NATIVE HEART WITHOUT ANGINA PECTORIS: ICD-10-CM

## 2018-02-22 DIAGNOSIS — F11.90 CHRONIC, CONTINUOUS USE OF OPIOIDS: ICD-10-CM

## 2018-02-22 DIAGNOSIS — I48.0 PAROXYSMAL ATRIAL FIBRILLATION: ICD-10-CM

## 2018-02-22 DIAGNOSIS — I20.9 CARDIAC ANGINA: ICD-10-CM

## 2018-02-22 DIAGNOSIS — I71.40 ABDOMINAL AORTIC ANEURYSM (AAA) WITHOUT RUPTURE: ICD-10-CM

## 2018-02-22 DIAGNOSIS — G95.89 MYELOMALACIA OF CERVICAL CORD: ICD-10-CM

## 2018-02-22 PROCEDURE — 3008F BODY MASS INDEX DOCD: CPT | Mod: S$GLB,,, | Performed by: INTERNAL MEDICINE

## 2018-02-22 PROCEDURE — 99999 PR PBB SHADOW E&M-EST. PATIENT-LVL IV: CPT | Mod: PBBFAC,,, | Performed by: INTERNAL MEDICINE

## 2018-02-22 PROCEDURE — 1159F MED LIST DOCD IN RCRD: CPT | Mod: S$GLB,,, | Performed by: INTERNAL MEDICINE

## 2018-02-22 PROCEDURE — 99214 OFFICE O/P EST MOD 30 MIN: CPT | Mod: S$GLB,,, | Performed by: INTERNAL MEDICINE

## 2018-02-22 PROCEDURE — 99499 UNLISTED E&M SERVICE: CPT | Mod: S$GLB,,, | Performed by: INTERNAL MEDICINE

## 2018-02-22 PROCEDURE — 1125F AMNT PAIN NOTED PAIN PRSNT: CPT | Mod: S$GLB,,, | Performed by: INTERNAL MEDICINE

## 2018-02-22 RX ORDER — OXYCODONE AND ACETAMINOPHEN 5; 325 MG/1; MG/1
1 TABLET ORAL EVERY 6 HOURS PRN
Qty: 90 TABLET | Refills: 0 | Status: SHIPPED | OUTPATIENT
Start: 2018-03-22 | End: 2018-05-11 | Stop reason: SDUPTHER

## 2018-02-22 RX ORDER — OXYCODONE AND ACETAMINOPHEN 5; 325 MG/1; MG/1
1 TABLET ORAL EVERY 6 HOURS PRN
Qty: 90 TABLET | Refills: 0 | Status: SHIPPED | OUTPATIENT
Start: 2018-02-22 | End: 2018-05-11 | Stop reason: SDUPTHER

## 2018-02-22 NOTE — PROGRESS NOTES
Subjective:       Patient ID: Nando Salguero is a 82 y.o. male.    Chief Complaint: Medication Refill    HPI:  82-year-old comes in for follow-up chronic medical conditions in review of pain medicine.  His last prescription was field nearly 3 months ago.  He says some days he is not using the medicine and has been doing and feeling better.  He denies any active or acute complaints.  He still has arthritis pain in the hands neck and shoulders but tries to stay active doing yard work etc.  He denies any GI or  complaints.  He is up-to-date with flu and pneumonia vaccines.      Medication Refill   Associated symptoms include arthralgias, joint swelling (fingers) and neck pain. Pertinent negatives include no abdominal pain, chest pain, chills, coughing, fatigue, fever, headaches, nausea, rash or vomiting.     Review of Systems   Constitutional: Negative for chills, fatigue, fever and unexpected weight change.   HENT: Negative for nosebleeds and trouble swallowing.    Eyes: Negative for pain and visual disturbance.   Respiratory: Negative for cough, shortness of breath and wheezing.    Cardiovascular: Negative for chest pain and palpitations.   Gastrointestinal: Negative for abdominal pain, constipation, diarrhea, nausea and vomiting.   Genitourinary: Negative for difficulty urinating and hematuria.   Musculoskeletal: Positive for arthralgias, joint swelling (fingers), neck pain and neck stiffness.   Skin: Negative for rash.   Neurological: Negative for dizziness and headaches.   Hematological: Does not bruise/bleed easily.   Psychiatric/Behavioral: Negative for dysphoric mood, sleep disturbance and suicidal ideas.       Objective:      Physical Exam   Constitutional: He is oriented to person, place, and time. He appears well-developed and well-nourished. No distress.   HENT:   Head: Normocephalic and atraumatic.   Right Ear: External ear normal.   Left Ear: External ear normal.   Mouth/Throat: Oropharynx is clear  and moist. No oropharyngeal exudate.   TM's clear, pharynx clear   Eyes: Conjunctivae and EOM are normal. Pupils are equal, round, and reactive to light. No scleral icterus.   Neck: Normal range of motion. Neck supple. No thyromegaly present.   Mild stiffness and tenderness with range of motion of the  neck   Cardiovascular: Normal rate, regular rhythm and normal heart sounds.    No murmur heard.  Pulmonary/Chest: Effort normal and breath sounds normal. He has no wheezes.   Abdominal: Soft. Bowel sounds are normal. He exhibits no mass. There is no tenderness.   Musculoskeletal: He exhibits tenderness ( low back) and deformity ( numerous finger joints are swollen and stiff and mildly tender). He exhibits no edema.   Lymphadenopathy:     He has no cervical adenopathy.   Neurological: He is alert and oriented to person, place, and time.   Skin: No pallor.   Psychiatric: He has a normal mood and affect.       Assessment:       1. Cervical stenosis of spine    2. Myelomalacia of cervical cord    3. Arthropathy of cervical facet joint    4. Abdominal aortic aneurysm (AAA) without rupture    5. Coronary artery disease involving native coronary artery of native heart without angina pectoris    6. Cardiac angina    7. Paroxysmal atrial fibrillation    8. Chronic, continuous use of opioids        Plan:       Nando was seen today for medication refill.    Diagnoses and all orders for this visit:    Cervical stenosis of spine  Comments:  Continue to monitor symptoms and medication. Uses pain medication sparingly.     Myelomalacia of cervical cord  Comments:  Continue to monitor symptoms and medication. Uses pain medication sparingly.     Arthropathy of cervical facet joint  Comments:  Continue to monitor symptoms and medication. Uses pain medication sparingly.     Abdominal aortic aneurysm (AAA) without rupture  Comments:  Continue medication. Continue to monitor, continue to see Cardiology.     Coronary artery disease  involving native coronary artery of native heart without angina pectoris  Comments:  Continue medication. Continue to monitor, continue to see Cardiology.     Cardiac angina  Comments:  Continue medication. Continue to monitor, continue to see Cardiology.     Paroxysmal atrial fibrillation  Comments:  Continue medication. Continue to monitor, continue to see Cardiology.     Chronic, continuous use of opioids  Comments:  Counselled on habit forming nature of medication. Do not drive or operate heavy machinery while on medication or determining effect of medication.     Other orders  -     oxyCODONE-acetaminophen (PERCOCET) 5-325 mg per tablet; Take 1 tablet by mouth every 6 (six) hours as needed for Pain.  -     oxyCODONE-acetaminophen (PERCOCET) 5-325 mg per tablet; Take 1 tablet by mouth every 6 (six) hours as needed for Pain.        Follow up in 3-4 months. Consider labs next visit.

## 2018-05-08 RX ORDER — OXYCODONE AND ACETAMINOPHEN 5; 325 MG/1; MG/1
1 TABLET ORAL EVERY 6 HOURS PRN
Qty: 90 TABLET | Refills: 0 | Status: CANCELLED | OUTPATIENT
Start: 2018-05-08

## 2018-05-08 NOTE — TELEPHONE ENCOUNTER
"----- Message from Flavia Elizabeth sent at 5/7/2018 12:07 PM CDT -----  Contact: In[place]/saundra/ [phone]  RX request - refill or new RX.  Is this a refill or new RX:    RX name and strength:   Directions:   Is this a 30 day or 90 day RX:    Pharmacy name and phone # (DON'T enter "on file" or "in chart"): [facility] [address] [place] [address] [phone] (Phone)  [phone] (Fax)      Comments:      Pharmacy is calling to clarify an RX.  RX name:  oxyCODONE-acetaminophen (PERCOCET) 5-325 mg per tablet  What do they need to clarify:    Comments:   "

## 2018-05-08 NOTE — TELEPHONE ENCOUNTER
----- Message from Chari Soriano sent at 5/8/2018  9:16 AM CDT -----  Contact: [facility] [phone]  Pharmacy is calling to clarify an RX.  RX name:      oxyCODONE-acetaminophen (PERCOCET) 5-325 mg per tablet    What do they need to clarify:  diagnosis code is needed and pt relationship to perscriber   Comments:

## 2018-05-10 ENCOUNTER — TELEPHONE (OUTPATIENT)
Dept: INTERNAL MEDICINE | Facility: CLINIC | Age: 83
End: 2018-05-10

## 2018-05-10 NOTE — TELEPHONE ENCOUNTER
----- Message from Chi Dow sent at 5/10/2018 11:44 AM CDT -----  Contact: [place]/[facility]/[phone]  Pharmacy is calling to speak with someone in the office in regards to the pt's oxyCODONE-acetaminophen (PERCOCET) 5-325 mg per tablet. They state that they need to know the diagnosis for the medication. Please advise.      Thanks

## 2018-05-11 ENCOUNTER — OFFICE VISIT (OUTPATIENT)
Dept: INTERNAL MEDICINE | Facility: CLINIC | Age: 83
End: 2018-05-11
Payer: MEDICARE

## 2018-05-11 VITALS
HEART RATE: 73 BPM | WEIGHT: 146.63 LBS | BODY MASS INDEX: 24.43 KG/M2 | DIASTOLIC BLOOD PRESSURE: 86 MMHG | HEIGHT: 65 IN | SYSTOLIC BLOOD PRESSURE: 162 MMHG | RESPIRATION RATE: 98 BRPM

## 2018-05-11 DIAGNOSIS — E78.2 MIXED HYPERLIPIDEMIA: ICD-10-CM

## 2018-05-11 DIAGNOSIS — F11.90 CHRONIC, CONTINUOUS USE OF OPIOIDS: ICD-10-CM

## 2018-05-11 DIAGNOSIS — F41.9 ANXIETY: ICD-10-CM

## 2018-05-11 DIAGNOSIS — I10 HYPERTENSION, BENIGN: ICD-10-CM

## 2018-05-11 DIAGNOSIS — G95.89 MYELOMALACIA OF CERVICAL CORD: ICD-10-CM

## 2018-05-11 DIAGNOSIS — M48.02 CERVICAL STENOSIS OF SPINE: Primary | ICD-10-CM

## 2018-05-11 PROCEDURE — 99213 OFFICE O/P EST LOW 20 MIN: CPT | Mod: S$GLB,,, | Performed by: INTERNAL MEDICINE

## 2018-05-11 PROCEDURE — 99499 UNLISTED E&M SERVICE: CPT | Mod: S$PBB,,, | Performed by: INTERNAL MEDICINE

## 2018-05-11 PROCEDURE — 99999 PR PBB SHADOW E&M-EST. PATIENT-LVL III: CPT | Mod: PBBFAC,,, | Performed by: INTERNAL MEDICINE

## 2018-05-11 PROCEDURE — 3079F DIAST BP 80-89 MM HG: CPT | Mod: CPTII,S$GLB,, | Performed by: INTERNAL MEDICINE

## 2018-05-11 PROCEDURE — 3077F SYST BP >= 140 MM HG: CPT | Mod: CPTII,S$GLB,, | Performed by: INTERNAL MEDICINE

## 2018-05-11 RX ORDER — OXYCODONE AND ACETAMINOPHEN 5; 325 MG/1; MG/1
1 TABLET ORAL EVERY 6 HOURS PRN
Qty: 90 TABLET | Refills: 0 | Status: SHIPPED | OUTPATIENT
Start: 2018-05-11 | End: 2018-06-25 | Stop reason: SDUPTHER

## 2018-05-11 RX ORDER — OXYCODONE AND ACETAMINOPHEN 5; 325 MG/1; MG/1
1 TABLET ORAL EVERY 6 HOURS PRN
Qty: 90 TABLET | Refills: 0 | Status: SHIPPED | OUTPATIENT
Start: 2018-06-12 | End: 2018-10-11 | Stop reason: SDUPTHER

## 2018-05-11 NOTE — PROGRESS NOTES
Subjective:       Patient ID: Nando Salguero is a 83 y.o. male.    Chief Complaint: Medication Refill    HPI:  Patient chronic back pain comes in to refill chronic pain meds.  Unfortunately he waited a month past the last prescription and was unable to get it filled.  He has been out of his medicine.  He is traveling with family to a graduation in [place] and says the prolonged car ride will aggravate his back.  He was hoping to get a refill.  I explained that even though he has decreased his total amount taken he must fill them within the prescribed date or the pharmacy will denied them.  He expressed understanding.  He has chronic neck and back pain and sometimes pain with walking.  Blood pressure stable, anxiety stable.  No recent chest pain or angina.      Medication Refill   Associated symptoms include arthralgias. Pertinent negatives include no abdominal pain, chest pain, chills, coughing, fatigue, fever, headaches, nausea, neck pain, rash or vomiting.     Review of Systems   Constitutional: Negative for chills, fatigue, fever and unexpected weight change.   HENT: Negative for nosebleeds and trouble swallowing.    Eyes: Negative for pain and visual disturbance.   Respiratory: Negative for cough, shortness of breath and wheezing.    Cardiovascular: Negative for chest pain and palpitations.   Gastrointestinal: Negative for abdominal pain, constipation, diarrhea, nausea and vomiting.   Genitourinary: Negative for difficulty urinating and hematuria.   Musculoskeletal: Positive for arthralgias, back pain and gait problem. Negative for neck pain.   Skin: Negative for rash.   Neurological: Negative for dizziness and headaches.   Hematological: Does not bruise/bleed easily.   Psychiatric/Behavioral: Negative for dysphoric mood, sleep disturbance and suicidal ideas.       Objective:      Physical Exam   Constitutional: He is oriented to person, place, and time. He appears well-developed and well-nourished.  No distress.   HENT:   Head: Normocephalic and atraumatic.   Mouth/Throat: No oropharyngeal exudate.   TM's clear, pharynx clear   Eyes: Conjunctivae and EOM are normal. Pupils are equal, round, and reactive to light. No scleral icterus.   Neck: Normal range of motion. Neck supple. No thyromegaly present.   No supraclavicular nodes palpated   Cardiovascular: Normal rate, regular rhythm and normal heart sounds.    No murmur heard.  Pulmonary/Chest: Effort normal and breath sounds normal. He has no wheezes.   Abdominal: Soft. Bowel sounds are normal. He exhibits no mass. There is no tenderness.   Musculoskeletal: He exhibits tenderness. He exhibits no edema.   Somewhat kyphotic at the upper back.  Arthritis changes and several fingers   Lymphadenopathy:     He has no cervical adenopathy.   Neurological: He is alert and oriented to person, place, and time.   Skin: No pallor.   Psychiatric: He has a normal mood and affect.       Assessment:       1. Cervical stenosis of spine    2. Myelomalacia of cervical cord    3. Anxiety    4. Chronic, continuous use of opioids    5. Hypertension, benign    6. Mixed hyperlipidemia        Plan:       Nando was seen today for medication refill.    Diagnoses and all orders for this visit:    Cervical stenosis of spine    Myelomalacia of cervical cord    Anxiety    Chronic, continuous use of opioids    Hypertension, benign    Mixed hyperlipidemia    Other orders  -     oxyCODONE-acetaminophen (PERCOCET) 5-325 mg per tablet; Take 1 tablet by mouth every 6 (six) hours as needed for Pain.  -     oxyCODONE-acetaminophen (PERCOCET) 5-325 mg per tablet; Take 1 tablet by mouth every 6 (six) hours as needed for Pain.        follow-up in a few months sooner p.r.n.

## 2018-05-14 ENCOUNTER — TELEPHONE (OUTPATIENT)
Dept: INTERNAL MEDICINE | Facility: CLINIC | Age: 83
End: 2018-05-14

## 2018-05-14 NOTE — TELEPHONE ENCOUNTER
Called [facility] and was told they refused to rill pt's rx because multiple attempts to contact office this weekend were unsuccessful. Pt took his script to be filled elsewhere. Attempted to contact pt, but received no answer. Left pt a voice message requesting a call back.

## 2018-05-14 NOTE — TELEPHONE ENCOUNTER
----- Message from Josselyn Whatley sent at 5/12/2018 11:27 AM CDT -----  Contact: Patient [phone]  [facility] will not fill the Rx oxyCODONE-acetaminophen (PERCOCET) 5-325 mg per tablet unless they speak to you first. Please call them asap because the patient is goint out of town on Tuesday.    [facility] [address] [place] [address]    Please call and advise.    Thank You

## 2018-05-14 NOTE — TELEPHONE ENCOUNTER
----- Message from Ayana Brooks sent at 5/14/2018  2:10 PM CDT -----  Contact: Patient  Patient is returning a phone call.  Who left a message for the patient: Karin  He got the prescription filled at [facility], he will be using them now.      Thank you

## 2018-06-25 ENCOUNTER — OFFICE VISIT (OUTPATIENT)
Dept: INTERNAL MEDICINE | Facility: CLINIC | Age: 83
End: 2018-06-25
Payer: MEDICARE

## 2018-06-25 VITALS
OXYGEN SATURATION: 90 % | WEIGHT: 146.63 LBS | SYSTOLIC BLOOD PRESSURE: 140 MMHG | HEART RATE: 60 BPM | DIASTOLIC BLOOD PRESSURE: 62 MMHG | BODY MASS INDEX: 24.4 KG/M2

## 2018-06-25 DIAGNOSIS — G89.29 CHRONIC LOW BACK PAIN, UNSPECIFIED BACK PAIN LATERALITY, WITH SCIATICA PRESENCE UNSPECIFIED: ICD-10-CM

## 2018-06-25 DIAGNOSIS — R13.19 ESOPHAGEAL DYSPHAGIA: ICD-10-CM

## 2018-06-25 DIAGNOSIS — F11.90 CHRONIC, CONTINUOUS USE OF OPIOIDS: ICD-10-CM

## 2018-06-25 DIAGNOSIS — M47.812 ARTHROPATHY OF CERVICAL FACET JOINT: ICD-10-CM

## 2018-06-25 DIAGNOSIS — M47.812 FACET ARTHROPATHY, CERVICAL: Primary | ICD-10-CM

## 2018-06-25 DIAGNOSIS — M54.5 CHRONIC LOW BACK PAIN, UNSPECIFIED BACK PAIN LATERALITY, WITH SCIATICA PRESENCE UNSPECIFIED: ICD-10-CM

## 2018-06-25 DIAGNOSIS — R13.10 DYSPHAGIA, UNSPECIFIED TYPE: ICD-10-CM

## 2018-06-25 DIAGNOSIS — M48.02 CERVICAL STENOSIS OF SPINE: ICD-10-CM

## 2018-06-25 PROCEDURE — 3078F DIAST BP <80 MM HG: CPT | Mod: CPTII,S$GLB,, | Performed by: INTERNAL MEDICINE

## 2018-06-25 PROCEDURE — 99214 OFFICE O/P EST MOD 30 MIN: CPT | Mod: S$GLB,,, | Performed by: INTERNAL MEDICINE

## 2018-06-25 PROCEDURE — 99999 PR PBB SHADOW E&M-EST. PATIENT-LVL III: CPT | Mod: PBBFAC,,, | Performed by: INTERNAL MEDICINE

## 2018-06-25 PROCEDURE — 3077F SYST BP >= 140 MM HG: CPT | Mod: CPTII,S$GLB,, | Performed by: INTERNAL MEDICINE

## 2018-06-25 RX ORDER — NITROGLYCERIN 0.4 MG/1
0.4 TABLET SUBLINGUAL EVERY 5 MIN PRN
Qty: 25 TABLET | Refills: 3 | Status: ON HOLD | OUTPATIENT
Start: 2018-06-25 | End: 2019-09-30 | Stop reason: SDUPTHER

## 2018-06-25 RX ORDER — OXYCODONE AND ACETAMINOPHEN 5; 325 MG/1; MG/1
1 TABLET ORAL EVERY 6 HOURS PRN
Qty: 90 TABLET | Refills: 0 | Status: SHIPPED | OUTPATIENT
Start: 2018-07-15 | End: 2018-10-11 | Stop reason: SDUPTHER

## 2018-06-25 NOTE — PROGRESS NOTES
Subjective:       Patient ID: Nando Salguero is a 83 y.o. male.    Chief Complaint: Follow-up    Patient with history of chronic neck and back pain, regular opioid use for pain control comes in for follow-up.  He needed a refill on his pain medication although this generally lasts him 2-3 months.  He also wanted to discuss some problems with swallowing where food gets stuck her caught.  He sometimes coughs or chokes.  He wanted to get tested for possible narrowing.  Denies acid sensation or weight loss or spitting up blood.  He has not tried to treat this in any way      Review of Systems   Constitutional: Negative for chills, fatigue, fever and unexpected weight change.   HENT: Negative for nosebleeds and trouble swallowing.    Eyes: Negative for pain and visual disturbance.   Respiratory: Negative for cough, shortness of breath and wheezing.    Cardiovascular: Negative for chest pain and palpitations.   Gastrointestinal: Positive for abdominal pain (Esophageal dysphagia symptoms). Negative for constipation, diarrhea, nausea and vomiting.   Genitourinary: Negative for difficulty urinating and hematuria.   Musculoskeletal: Positive for arthralgias and back pain. Negative for neck pain.   Skin: Negative for rash.   Neurological: Negative for dizziness and headaches.   Hematological: Does not bruise/bleed easily.   Psychiatric/Behavioral: Negative for dysphoric mood, sleep disturbance and suicidal ideas.       Objective:      Physical Exam   Constitutional: He is oriented to person, place, and time. He appears well-developed and well-nourished. No distress.   HENT:   Head: Normocephalic and atraumatic.   Right Ear: External ear normal.   Left Ear: External ear normal.   Mouth/Throat: Oropharynx is clear and moist. No oropharyngeal exudate.   TM's clear, pharynx clear   Eyes: Conjunctivae and EOM are normal. Pupils are equal, round, and reactive to light. No scleral icterus.   Neck: No thyromegaly present.   Mild  musculoskeletal neck stiffness and mild pain   Cardiovascular: Normal rate, regular rhythm and normal heart sounds.    No murmur heard.  Pulmonary/Chest: Effort normal and breath sounds normal. He has no wheezes.   Abdominal: Soft. Bowel sounds are normal. He exhibits no mass. There is no tenderness.   Musculoskeletal: He exhibits tenderness (Neck and low back, stable). He exhibits no edema.   Lymphadenopathy:     He has no cervical adenopathy.   Neurological: He is alert and oriented to person, place, and time.   Skin: No rash noted. No erythema. No pallor.   Psychiatric: He has a normal mood and affect. His behavior is normal.       Assessment:       1. Facet arthropathy, cervical    2. Chronic low back pain, unspecified back pain laterality, with sciatica presence unspecified    3. Arthropathy of cervical facet joint    4. Cervical stenosis of spine    5. Chronic, continuous use of opioids    6. Dysphagia, unspecified type    7. Esophageal dysphagia        Plan:       Nando was seen today for follow-up.    Diagnoses and all orders for this visit:    Facet arthropathy, cervical    Chronic low back pain, unspecified back pain laterality, with sciatica presence unspecified    Arthropathy of cervical facet joint    Cervical stenosis of spine    Chronic, continuous use of opioids    Dysphagia, unspecified type    Esophageal dysphagia  Comments:  Solid, dry food or large food getting stuck. He has to spit it up.   Orders:  -     Case request GI: ESOPHAGOGASTRODUODENOSCOPY (EGD)    Other orders  -     nitroGLYCERIN (NITROSTAT) 0.4 MG SL tablet; Place 1 tablet (0.4 mg total) under the tongue every 5 (five) minutes as needed for Chest pain.  -     oxyCODONE-acetaminophen (PERCOCET) 5-325 mg per tablet; Take 1 tablet by mouth every 6 (six) hours as needed for Pain.

## 2018-06-27 ENCOUNTER — TELEPHONE (OUTPATIENT)
Dept: ENDOSCOPY | Facility: HOSPITAL | Age: 83
End: 2018-06-27

## 2018-09-13 RX ORDER — LEVOTHYROXINE SODIUM 125 UG/1
TABLET ORAL
Qty: 90 TABLET | Refills: 0 | Status: SHIPPED | OUTPATIENT
Start: 2018-09-13 | End: 2018-10-11 | Stop reason: SDUPTHER

## 2018-09-28 ENCOUNTER — PES CALL (OUTPATIENT)
Dept: ADMINISTRATIVE | Facility: CLINIC | Age: 83
End: 2018-09-28

## 2018-10-11 ENCOUNTER — IMMUNIZATION (OUTPATIENT)
Dept: INTERNAL MEDICINE | Facility: CLINIC | Age: 83
End: 2018-10-11
Payer: MEDICARE

## 2018-10-11 ENCOUNTER — OFFICE VISIT (OUTPATIENT)
Dept: INTERNAL MEDICINE | Facility: CLINIC | Age: 83
End: 2018-10-11
Payer: MEDICARE

## 2018-10-11 ENCOUNTER — TELEPHONE (OUTPATIENT)
Dept: INTERNAL MEDICINE | Facility: CLINIC | Age: 83
End: 2018-10-11

## 2018-10-11 VITALS
OXYGEN SATURATION: 99 % | WEIGHT: 146.19 LBS | DIASTOLIC BLOOD PRESSURE: 60 MMHG | HEIGHT: 65 IN | HEART RATE: 73 BPM | SYSTOLIC BLOOD PRESSURE: 110 MMHG | BODY MASS INDEX: 24.36 KG/M2

## 2018-10-11 DIAGNOSIS — E89.0 POSTOPERATIVE HYPOTHYROIDISM: ICD-10-CM

## 2018-10-11 DIAGNOSIS — E78.2 MIXED HYPERLIPIDEMIA: Primary | ICD-10-CM

## 2018-10-11 DIAGNOSIS — I10 HYPERTENSION, BENIGN: ICD-10-CM

## 2018-10-11 PROCEDURE — 99213 OFFICE O/P EST LOW 20 MIN: CPT | Mod: PBBFAC,25 | Performed by: INTERNAL MEDICINE

## 2018-10-11 PROCEDURE — 90662 IIV NO PRSV INCREASED AG IM: CPT | Mod: PBBFAC

## 2018-10-11 PROCEDURE — 3074F SYST BP LT 130 MM HG: CPT | Mod: CPTII,,, | Performed by: INTERNAL MEDICINE

## 2018-10-11 PROCEDURE — 1101F PT FALLS ASSESS-DOCD LE1/YR: CPT | Mod: CPTII,,, | Performed by: INTERNAL MEDICINE

## 2018-10-11 PROCEDURE — 99999 PR PBB SHADOW E&M-EST. PATIENT-LVL III: CPT | Mod: PBBFAC,,, | Performed by: INTERNAL MEDICINE

## 2018-10-11 PROCEDURE — 3078F DIAST BP <80 MM HG: CPT | Mod: CPTII,,, | Performed by: INTERNAL MEDICINE

## 2018-10-11 PROCEDURE — 99214 OFFICE O/P EST MOD 30 MIN: CPT | Mod: S$PBB,,, | Performed by: INTERNAL MEDICINE

## 2018-10-11 RX ORDER — LEVOTHYROXINE SODIUM 125 UG/1
TABLET ORAL
Qty: 90 TABLET | Refills: 4 | Status: SHIPPED | OUTPATIENT
Start: 2018-10-11 | End: 2019-12-18 | Stop reason: SDUPTHER

## 2018-10-11 RX ORDER — LISINOPRIL 10 MG/1
10 TABLET ORAL DAILY
Qty: 90 TABLET | Refills: 5 | Status: ON HOLD | OUTPATIENT
Start: 2018-10-11 | End: 2019-09-24 | Stop reason: SDUPTHER

## 2018-10-11 RX ORDER — OXYCODONE AND ACETAMINOPHEN 5; 325 MG/1; MG/1
1 TABLET ORAL EVERY 6 HOURS PRN
Qty: 90 TABLET | Refills: 0 | Status: SHIPPED | OUTPATIENT
Start: 2018-11-11 | End: 2019-04-15 | Stop reason: SDUPTHER

## 2018-10-11 RX ORDER — ATORVASTATIN CALCIUM 40 MG/1
40 TABLET, FILM COATED ORAL DAILY
Qty: 90 TABLET | Refills: 3 | Status: SHIPPED | OUTPATIENT
Start: 2018-10-11 | End: 2019-10-15 | Stop reason: SDUPTHER

## 2018-10-11 RX ORDER — AMLODIPINE BESYLATE 10 MG/1
TABLET ORAL
Qty: 90 TABLET | Refills: 11 | Status: ON HOLD | OUTPATIENT
Start: 2018-10-11 | End: 2019-10-07 | Stop reason: HOSPADM

## 2018-10-11 RX ORDER — DULOXETIN HYDROCHLORIDE 30 MG/1
30 CAPSULE, DELAYED RELEASE ORAL DAILY
Qty: 90 CAPSULE | Refills: 11 | Status: SHIPPED | OUTPATIENT
Start: 2018-10-11 | End: 2019-12-17

## 2018-10-11 RX ORDER — OXYCODONE AND ACETAMINOPHEN 5; 325 MG/1; MG/1
1 TABLET ORAL EVERY 6 HOURS PRN
Qty: 90 TABLET | Refills: 0 | Status: SHIPPED | OUTPATIENT
Start: 2018-10-11 | End: 2019-04-15

## 2018-10-11 NOTE — PROGRESS NOTES
Subjective:       Patient ID: Nando Salguero is a 83 y.o. male.    Chief Complaint: Follow-up (Flu shot) and Medication Refill    HPI:  Patient comes in for interval follow-up of chronic back and spine pain with cervical spinal stenosis, anxiety, arthritis.  Patient states his pain control has been stable.  He is trying to reduce his intake of medication and actually has not had a refill of his pain medicine since July.  Vitals are stable.  He would like to get flu shot.  He is due for some labs and updated prescriptions.  We reviewed the risks benefits and side effects medication.  He expressed understanding.       Review of Systems   Constitutional: Negative for chills, fatigue, fever and unexpected weight change.   HENT: Negative for nosebleeds and trouble swallowing.    Eyes: Negative for pain and visual disturbance.   Respiratory: Negative for cough, shortness of breath and wheezing.    Cardiovascular: Negative for chest pain and palpitations.   Gastrointestinal: Negative for abdominal pain, constipation, diarrhea, nausea and vomiting.   Genitourinary: Negative for difficulty urinating and hematuria.   Musculoskeletal: Positive for arthralgias, back pain, gait problem and joint swelling. Negative for neck pain.   Skin: Negative for rash.   Neurological: Negative for dizziness and headaches.   Hematological: Does not bruise/bleed easily.   Psychiatric/Behavioral: Negative for dysphoric mood, sleep disturbance and suicidal ideas.       Objective:      Physical Exam   Constitutional: He is oriented to person, place, and time. He appears well-developed and well-nourished. No distress.   HENT:   Head: Normocephalic and atraumatic.   Right Ear: External ear normal.   Left Ear: External ear normal.   Mouth/Throat: Oropharynx is clear and moist. No oropharyngeal exudate.   TM's clear, pharynx clear   Eyes: Conjunctivae and EOM are normal. Pupils are equal, round, and reactive to light. No scleral icterus.   Neck:  Normal range of motion. Neck supple. No thyromegaly present.   No supraclavicular nodes palpated   Cardiovascular: Normal rate, regular rhythm and normal heart sounds.   No murmur heard.  Pulmonary/Chest: Effort normal and breath sounds normal. He has no wheezes.   Abdominal: Soft. Bowel sounds are normal. He exhibits no mass. There is no tenderness.   Musculoskeletal: He exhibits tenderness and deformity (fingers). He exhibits no edema.   Lymphadenopathy:     He has no cervical adenopathy.   Neurological: He is alert and oriented to person, place, and time.   Skin: No rash noted. No erythema. No pallor.   Psychiatric: He has a normal mood and affect. His behavior is normal.       Assessment:       1. Mixed hyperlipidemia    2. Hypertension, benign    3. Postoperative hypothyroidism        Plan:       Nando was seen today for follow-up and medication refill.    Diagnoses and all orders for this visit:    Mixed hyperlipidemia  -     Basic metabolic panel; Future  -     Cholesterol, total; Future  -     CBC auto differential; Future  -     TSH; Future    Hypertension, benign  -     Basic metabolic panel; Future  -     Cholesterol, total; Future  -     CBC auto differential; Future  -     lisinopril 10 MG tablet; Take 1 tablet (10 mg total) by mouth once daily.  -     TSH; Future    Postoperative hypothyroidism  -     Basic metabolic panel; Future  -     Cholesterol, total; Future  -     CBC auto differential; Future  -     TSH; Future    Other orders  -     amLODIPine (NORVASC) 10 MG tablet; TAKE 1 TABLET ONE TIME DAILY  -     atorvastatin (LIPITOR) 40 MG tablet; Take 1 tablet (40 mg total) by mouth once daily.  -     levothyroxine (SYNTHROID) 125 MCG tablet; TAKE 1 TABLET BY MOUTH IN THE MORNING BEFORE BREAKFAST  -     DULoxetine (CYMBALTA) 30 MG capsule; Take 1 capsule (30 mg total) by mouth once daily.  -     oxyCODONE-acetaminophen (PERCOCET) 5-325 mg per tablet; Take 1 tablet by mouth every 6 (six) hours as  needed for Pain.  -     oxyCODONE-acetaminophen (PERCOCET) 5-325 mg per tablet; Take 1 tablet by mouth every 6 (six) hours as needed for Pain.        follow-up in 6 months, review labs

## 2018-10-12 ENCOUNTER — TELEPHONE (OUTPATIENT)
Dept: INTERNAL MEDICINE | Facility: CLINIC | Age: 83
End: 2018-10-12

## 2018-10-12 NOTE — TELEPHONE ENCOUNTER
----- Message from Rogelio Oliver MD sent at 10/12/2018  6:34 AM CDT -----  Please let pt know labs were stable. Mild anemia persists but is stable

## 2018-11-05 ENCOUNTER — PES CALL (OUTPATIENT)
Dept: ADMINISTRATIVE | Facility: CLINIC | Age: 83
End: 2018-11-05

## 2018-11-26 ENCOUNTER — TELEPHONE (OUTPATIENT)
Dept: INTERNAL MEDICINE | Facility: CLINIC | Age: 83
End: 2018-11-26

## 2018-11-26 NOTE — TELEPHONE ENCOUNTER
----- Message from Swati Zavaleta sent at 11/26/2018 10:19 AM CST -----  Contact: Pt's wife Robina   Pt's wife is calling in regards to rescheduling pt's annual wellness visit on 12/17. Pt's wife would like to reschedule to 12/03.    She can be reached at [phone].    Thank you

## 2018-12-17 ENCOUNTER — OFFICE VISIT (OUTPATIENT)
Dept: INTERNAL MEDICINE | Facility: CLINIC | Age: 83
End: 2018-12-17
Payer: MEDICARE

## 2018-12-17 VITALS
HEIGHT: 65 IN | SYSTOLIC BLOOD PRESSURE: 98 MMHG | OXYGEN SATURATION: 97 % | DIASTOLIC BLOOD PRESSURE: 60 MMHG | WEIGHT: 143.75 LBS | HEART RATE: 86 BPM | BODY MASS INDEX: 23.95 KG/M2

## 2018-12-17 DIAGNOSIS — Z00.00 ENCOUNTER FOR PREVENTIVE HEALTH EXAMINATION: Primary | ICD-10-CM

## 2018-12-17 DIAGNOSIS — Z23 NEED FOR TDAP VACCINATION: ICD-10-CM

## 2018-12-17 DIAGNOSIS — G95.89 MYELOMALACIA OF CERVICAL CORD: ICD-10-CM

## 2018-12-17 DIAGNOSIS — F11.90 CHRONIC, CONTINUOUS USE OF OPIOIDS: ICD-10-CM

## 2018-12-17 DIAGNOSIS — I25.10 CORONARY ARTERY DISEASE INVOLVING NATIVE CORONARY ARTERY OF NATIVE HEART WITHOUT ANGINA PECTORIS: ICD-10-CM

## 2018-12-17 DIAGNOSIS — E78.2 MIXED HYPERLIPIDEMIA: ICD-10-CM

## 2018-12-17 DIAGNOSIS — G89.29 CHRONIC LOW BACK PAIN, UNSPECIFIED BACK PAIN LATERALITY, WITH SCIATICA PRESENCE UNSPECIFIED: ICD-10-CM

## 2018-12-17 DIAGNOSIS — I71.40 ABDOMINAL AORTIC ANEURYSM WITHOUT RUPTURE: ICD-10-CM

## 2018-12-17 DIAGNOSIS — E89.0 POSTOPERATIVE HYPOTHYROIDISM: ICD-10-CM

## 2018-12-17 DIAGNOSIS — M54.5 CHRONIC LOW BACK PAIN, UNSPECIFIED BACK PAIN LATERALITY, WITH SCIATICA PRESENCE UNSPECIFIED: ICD-10-CM

## 2018-12-17 DIAGNOSIS — I10 HYPERTENSION, BENIGN: ICD-10-CM

## 2018-12-17 DIAGNOSIS — I20.9 CARDIAC ANGINA: ICD-10-CM

## 2018-12-17 DIAGNOSIS — I71.40 ABDOMINAL AORTIC ANEURYSM (AAA) WITHOUT RUPTURE: ICD-10-CM

## 2018-12-17 DIAGNOSIS — I48.0 PAROXYSMAL ATRIAL FIBRILLATION: ICD-10-CM

## 2018-12-17 PROCEDURE — 99999 PR PBB SHADOW E&M-EST. PATIENT-LVL IV: CPT | Mod: PBBFAC,HCNC,, | Performed by: NURSE PRACTITIONER

## 2018-12-17 PROCEDURE — G0439 PPPS, SUBSEQ VISIT: HCPCS | Mod: HCNC,S$GLB,, | Performed by: NURSE PRACTITIONER

## 2018-12-17 PROCEDURE — 3078F DIAST BP <80 MM HG: CPT | Mod: CPTII,HCNC,S$GLB, | Performed by: NURSE PRACTITIONER

## 2018-12-17 PROCEDURE — 3074F SYST BP LT 130 MM HG: CPT | Mod: CPTII,HCNC,S$GLB, | Performed by: NURSE PRACTITIONER

## 2018-12-17 NOTE — PATIENT INSTRUCTIONS
Counseling and Referral of Other Preventative  (Italic type indicates deductible and co-insurance are waived)    Patient Name: Nando Salguero  Today's Date: 12/17/2018    Health Maintenance       Date Due Completion Date    TETANUS VACCINE 03/17/1953--refused ---    Zoster Vaccine 02/22/2019 (Originally 3/17/1995) ---    Lipid Panel 10/11/2023 10/11/2018        No orders of the defined types were placed in this encounter.    The following information is provided to all patients.  This information is to help you find resources for any of the problems found today that may be affecting your health:                Living healthy guide: www.[facility].[place].[facility]      Understanding Diabetes: www.diabetes.org      Eating healthy: www.cdc.gov/healthyweight      CDC home safety checklist: www.cdc.gov/steadi/patient.html      Agency on Aging: www.goea.[place].[facility]      Alcoholics anonymous (AA): www.aa.org      Physical Activity: www.calderon.nih.gov/tr5foca      Tobacco use: www.quitwithusla.org

## 2018-12-17 NOTE — PROGRESS NOTES
"Nando Salguero presented for a  Medicare AWV and comprehensive Health Risk Assessment today. The following components were reviewed and updated:    · Medical history  · Family History  · Social history  · Allergies and Current Medications  · Health Risk Assessment  · Health Maintenance  · Care Team     ** See Completed Assessments for Annual Wellness Visit within the encounter summary.**       The following assessments were completed:  · Living Situation  · CAGE  · Depression Screening  · Timed Get Up and Go  · Whisper Test--abnormal screening, pt not interested in hearing test  · Cognitive Function Screening        · Nutrition Screening  · ADL Screening  · PAQ Screening    Vitals:    12/17/18 0846   BP: 98/60   BP Location: Right arm   Patient Position: Sitting   BP Method: Medium (Manual)   Pulse: 86   SpO2: 97%   Weight: 65.2 kg (143 lb 11.8 oz)   Height: 5' 5" (1.651 m)     Body mass index is 23.92 kg/m².  Physical Exam   Constitutional: He is oriented to person, place, and time. He appears well-developed and well-nourished.   HENT:   Head: Normocephalic.   Eyes: Conjunctivae, EOM and lids are normal. Pupils are equal, round, and reactive to light. Lids are everted and swept, no foreign bodies found.   Neck: Trachea normal, normal range of motion and full passive range of motion without pain. Neck supple. No JVD present. Carotid bruit is not present.   Cardiovascular: Normal rate, regular rhythm, normal heart sounds, intact distal pulses and normal pulses.   Pulmonary/Chest: Effort normal and breath sounds normal.   Abdominal: Soft. Normal appearance and bowel sounds are normal. There is no hepatosplenomegaly. There is no CVA tenderness.   Musculoskeletal:   Walks with a cane   Neurological: He is alert and oriented to person, place, and time.   Skin: Skin is warm, dry and intact. Capillary refill takes less than 2 seconds.   Psychiatric: He has a normal mood and affect. His speech is normal and behavior is " normal. Judgment and thought content normal. Cognition and memory are normal.   Nursing note and vitals reviewed.        Diagnoses and health risks identified today and associated recommendations/orders:    1. Encounter for preventive health examination  Exam done    Health Maintenance updated    Records reviewed    2. Cardiac angina  Chronic, followed by PCP and Cardiology    3. Abdominal aortic aneurysm without rupture  Chronic, followed by PCP and Cardiology    4. Abdominal aortic aneurysm (AAA) without rupture  Chronic, followed by PCP and Cardiology    5. Mixed hyperlipidemia  Stable, followed by PCP    Continue cholesterol med, low fat diet, and exercise.     6. Coronary artery disease involving native coronary artery of native heart without angina pectoris  Chronic, followed by PCP and Cardiology    7. Hypertension, benign  Stable, followed by PCP    Take medications as prescribed.    Monitor BP at home, goal BP < or = 140/80, call office if consistently above this range.    Follow low salt DASH diet and exercise.    BMI reviewed.    Go to ED if Headaches, blurred vision, chest pain, or SOB occurs along with elevated readings > or = 160/90.    8. Paroxysmal atrial fibrillation  Chronic, followed by PCP and Cardiology    9. Myelomalacia of cervical cord  Chronic, followed by PCP    10. Postoperative hypothyroidism  Stable, followed by PCP    On thyroid meds, advised to take separate from other meds and vitamins    11. Chronic low back pain, unspecified back pain laterality, with sciatica presence unspecified  Chronic, followed by PCP    12. Chronic, continuous use of opioids  Chronic, followed by PCP    13. Need for Tdap vaccination  Pt refused    14. BMI 23.0-23.9, adult  BMI reviewed    Provided Nando with a 5-10 year written screening schedule and personal prevention plan. Recommendations were developed using the USPSTF age appropriate recommendations. Education, counseling, and referrals were provided as  needed. After Visit Summary printed and given to patient which includes a list of additional screenings\tests needed.    Follow-up in about 4 months (around 4/17/2019), or with PCP Dr. Oliver for f/u.    Lisa Lilly DNP

## 2019-04-15 ENCOUNTER — OFFICE VISIT (OUTPATIENT)
Dept: INTERNAL MEDICINE | Facility: CLINIC | Age: 84
End: 2019-04-15
Payer: MEDICARE

## 2019-04-15 VITALS
SYSTOLIC BLOOD PRESSURE: 116 MMHG | OXYGEN SATURATION: 98 % | WEIGHT: 144.81 LBS | BODY MASS INDEX: 24.12 KG/M2 | HEART RATE: 78 BPM | HEIGHT: 65 IN | DIASTOLIC BLOOD PRESSURE: 62 MMHG

## 2019-04-15 DIAGNOSIS — G95.89 MYELOMALACIA OF CERVICAL CORD: ICD-10-CM

## 2019-04-15 DIAGNOSIS — D64.9 ANEMIA, UNSPECIFIED TYPE: ICD-10-CM

## 2019-04-15 DIAGNOSIS — I25.10 CORONARY ARTERY DISEASE INVOLVING NATIVE CORONARY ARTERY OF NATIVE HEART WITHOUT ANGINA PECTORIS: ICD-10-CM

## 2019-04-15 DIAGNOSIS — E78.5 HYPERLIPIDEMIA, UNSPECIFIED HYPERLIPIDEMIA TYPE: Primary | ICD-10-CM

## 2019-04-15 DIAGNOSIS — I10 HYPERTENSION, BENIGN: ICD-10-CM

## 2019-04-15 DIAGNOSIS — M47.812 ARTHROPATHY OF CERVICAL FACET JOINT: ICD-10-CM

## 2019-04-15 DIAGNOSIS — M46.92 INFLAMMATORY SPONDYLOPATHY OF CERVICAL REGION: ICD-10-CM

## 2019-04-15 DIAGNOSIS — R26.81 UNSTEADINESS ON FEET: ICD-10-CM

## 2019-04-15 DIAGNOSIS — I20.9 CARDIAC ANGINA: ICD-10-CM

## 2019-04-15 DIAGNOSIS — I48.0 PAROXYSMAL ATRIAL FIBRILLATION: ICD-10-CM

## 2019-04-15 DIAGNOSIS — I71.40 ABDOMINAL AORTIC ANEURYSM WITHOUT RUPTURE: ICD-10-CM

## 2019-04-15 DIAGNOSIS — E04.1 THYROID NODULE: ICD-10-CM

## 2019-04-15 DIAGNOSIS — M48.02 CERVICAL STENOSIS OF SPINE: ICD-10-CM

## 2019-04-15 PROCEDURE — 99499 RISK ADDL DX/OHS AUDIT: ICD-10-PCS | Mod: HCNC,S$GLB,, | Performed by: INTERNAL MEDICINE

## 2019-04-15 PROCEDURE — 99214 PR OFFICE/OUTPT VISIT, EST, LEVL IV, 30-39 MIN: ICD-10-PCS | Mod: HCNC,S$GLB,, | Performed by: INTERNAL MEDICINE

## 2019-04-15 PROCEDURE — 1101F PT FALLS ASSESS-DOCD LE1/YR: CPT | Mod: HCNC,CPTII,S$GLB, | Performed by: INTERNAL MEDICINE

## 2019-04-15 PROCEDURE — 3074F PR MOST RECENT SYSTOLIC BLOOD PRESSURE < 130 MM HG: ICD-10-PCS | Mod: HCNC,CPTII,S$GLB, | Performed by: INTERNAL MEDICINE

## 2019-04-15 PROCEDURE — 1101F PR PT FALLS ASSESS DOC 0-1 FALLS W/OUT INJ PAST YR: ICD-10-PCS | Mod: HCNC,CPTII,S$GLB, | Performed by: INTERNAL MEDICINE

## 2019-04-15 PROCEDURE — 3078F PR MOST RECENT DIASTOLIC BLOOD PRESSURE < 80 MM HG: ICD-10-PCS | Mod: HCNC,CPTII,S$GLB, | Performed by: INTERNAL MEDICINE

## 2019-04-15 PROCEDURE — 99999 PR PBB SHADOW E&M-EST. PATIENT-LVL III: ICD-10-PCS | Mod: PBBFAC,HCNC,, | Performed by: INTERNAL MEDICINE

## 2019-04-15 PROCEDURE — 99999 PR PBB SHADOW E&M-EST. PATIENT-LVL III: CPT | Mod: PBBFAC,HCNC,, | Performed by: INTERNAL MEDICINE

## 2019-04-15 PROCEDURE — 3078F DIAST BP <80 MM HG: CPT | Mod: HCNC,CPTII,S$GLB, | Performed by: INTERNAL MEDICINE

## 2019-04-15 PROCEDURE — 99499 UNLISTED E&M SERVICE: CPT | Mod: HCNC,S$GLB,, | Performed by: INTERNAL MEDICINE

## 2019-04-15 PROCEDURE — 3074F SYST BP LT 130 MM HG: CPT | Mod: HCNC,CPTII,S$GLB, | Performed by: INTERNAL MEDICINE

## 2019-04-15 PROCEDURE — 99214 OFFICE O/P EST MOD 30 MIN: CPT | Mod: HCNC,S$GLB,, | Performed by: INTERNAL MEDICINE

## 2019-04-15 RX ORDER — OXYCODONE AND ACETAMINOPHEN 5; 325 MG/1; MG/1
1 TABLET ORAL EVERY 6 HOURS PRN
Qty: 90 TABLET | Refills: 0 | Status: SHIPPED | OUTPATIENT
Start: 2019-04-15 | End: 2019-04-15 | Stop reason: SDUPTHER

## 2019-04-15 RX ORDER — OXYCODONE AND ACETAMINOPHEN 5; 325 MG/1; MG/1
1 TABLET ORAL EVERY 6 HOURS PRN
Qty: 90 TABLET | Refills: 0 | Status: SHIPPED | OUTPATIENT
Start: 2019-04-15 | End: 2019-09-11 | Stop reason: SDUPTHER

## 2019-04-15 NOTE — PROGRESS NOTES
Subjective:       Patient ID: Nando Salguero is a 84 y.o. male.    Chief Complaint: Follow-up (4mo. )    HPI:  Patient comes in for follow-up hypertension CAD, chronic neck and back pain. He says his pain medication use is markedly decreased.  His last refill was about 4 months ago.  He is using it infrequently but hopes to continue doing so.  He says he averages taking pill once or twice a week.  No escalation.  No side effects.  He has not had neck injections in a while.  No recent nitroglycerin use for his heart  Anxiety stable  We would like to update some blood labs.    Review of Systems   Constitutional: Negative for chills, fatigue, fever and unexpected weight change.   HENT: Negative for nosebleeds and trouble swallowing.    Eyes: Negative for pain and visual disturbance.   Respiratory: Negative for cough, shortness of breath and wheezing.    Cardiovascular: Negative for chest pain and palpitations.   Gastrointestinal: Negative for abdominal pain, constipation, diarrhea, nausea and vomiting.   Genitourinary: Negative for difficulty urinating and hematuria.   Musculoskeletal: Positive for arthralgias, back pain, gait problem (Using a cane now), neck pain and neck stiffness.   Skin: Negative for rash.   Neurological: Negative for dizziness and headaches.   Hematological: Does not bruise/bleed easily.   Psychiatric/Behavioral: Negative for dysphoric mood, sleep disturbance and suicidal ideas.       Objective:      Physical Exam   Constitutional: He is oriented to person, place, and time. He appears well-developed and well-nourished. No distress.   HENT:   Head: Normocephalic and atraumatic.   Right Ear: External ear normal.   Left Ear: External ear normal.   Mouth/Throat: Oropharynx is clear and moist. No oropharyngeal exudate.   TM's clear, pharynx clear   Eyes: Pupils are equal, round, and reactive to light. Conjunctivae and EOM are normal. No scleral icterus.   Neck: Normal range of motion. Neck supple.  No thyromegaly present.   No supraclavicular nodes palpated   Cardiovascular: Normal rate, regular rhythm and normal heart sounds.   No murmur heard.  Pulmonary/Chest: Effort normal and breath sounds normal. He has no wheezes.   Abdominal: Soft. Bowel sounds are normal. He exhibits no mass. There is no tenderness.   Musculoskeletal: He exhibits tenderness ( neck and low back) and deformity (Arthritis changes in the fingers). He exhibits no edema.   Lymphadenopathy:     He has no cervical adenopathy.   Neurological: He is alert and oriented to person, place, and time.   Skin: No rash noted. No erythema. No pallor.   Psychiatric: He has a normal mood and affect. His behavior is normal.       Assessment:       1. Hyperlipidemia, unspecified hyperlipidemia type    2. Cervical stenosis of spine    3. Myelomalacia of cervical cord    4. Hypertension, benign    5. Coronary artery disease involving native coronary artery of native heart without angina pectoris    6. Arthropathy of cervical facet joint    7. Thyroid nodule    8. Anemia, unspecified type    9. Unsteadiness on feet     10. Inflammatory spondylopathy of cervical region    11. Cardiac angina    12. Paroxysmal atrial fibrillation    13. Abdominal aortic aneurysm without rupture        Plan:       Nando was seen today for follow-up.    Diagnoses and all orders for this visit:    Hyperlipidemia, unspecified hyperlipidemia type  -     TSH; Future  -     CBC auto differential; Future  -     Vitamin B12; Future    Cervical stenosis of spine  -     TSH; Future  -     CBC auto differential; Future  -     Vitamin B12; Future    Myelomalacia of cervical cord  -     TSH; Future  -     CBC auto differential; Future  -     Vitamin B12; Future    Hypertension, benign  -     TSH; Future  -     CBC auto differential; Future  -     Vitamin B12; Future    Coronary artery disease involving native coronary artery of native heart without angina pectoris  -     TSH; Future  -      CBC auto differential; Future  -     Vitamin B12; Future    Arthropathy of cervical facet joint  -     TSH; Future  -     CBC auto differential; Future  -     Vitamin B12; Future    Thyroid nodule  -     TSH; Future  -     CBC auto differential; Future  -     Vitamin B12; Future    Anemia, unspecified type  -     TSH; Future  -     CBC auto differential; Future  -     Vitamin B12; Future    Unsteadiness on feet   Comments:  Using a cane now.   Orders:  -     Vitamin B12; Future    Inflammatory spondylopathy of cervical region    Cardiac angina  Comments:  Stable, sees Cardiology. Continue medication      Paroxysmal atrial fibrillation  Comments:  Stable, sees Cardiology    Abdominal aortic aneurysm without rupture  Comments:  Stable, sees Cardiology    Other orders  -     Discontinue: oxyCODONE-acetaminophen (PERCOCET) 5-325 mg per tablet; Take 1 tablet by mouth every 6 (six) hours as needed for Pain.  -     oxyCODONE-acetaminophen (PERCOCET) 5-325 mg per tablet; Take 1 tablet by mouth every 6 (six) hours as needed for Pain.

## 2019-05-08 ENCOUNTER — TELEPHONE (OUTPATIENT)
Dept: INTERNAL MEDICINE | Facility: CLINIC | Age: 84
End: 2019-05-08

## 2019-06-20 ENCOUNTER — TELEPHONE (OUTPATIENT)
Dept: INTERNAL MEDICINE | Facility: CLINIC | Age: 84
End: 2019-06-20

## 2019-09-03 ENCOUNTER — TELEPHONE (OUTPATIENT)
Dept: INTERNAL MEDICINE | Facility: CLINIC | Age: 84
End: 2019-09-03

## 2019-09-04 ENCOUNTER — OFFICE VISIT (OUTPATIENT)
Dept: INTERNAL MEDICINE | Facility: CLINIC | Age: 84
End: 2019-09-04
Payer: MEDICARE

## 2019-09-04 VITALS
DIASTOLIC BLOOD PRESSURE: 70 MMHG | HEIGHT: 65 IN | HEART RATE: 60 BPM | WEIGHT: 140.63 LBS | BODY MASS INDEX: 23.43 KG/M2 | SYSTOLIC BLOOD PRESSURE: 120 MMHG

## 2019-09-04 DIAGNOSIS — M54.40 CHRONIC LOW BACK PAIN WITH SCIATICA, SCIATICA LATERALITY UNSPECIFIED, UNSPECIFIED BACK PAIN LATERALITY: ICD-10-CM

## 2019-09-04 DIAGNOSIS — I71.40 ABDOMINAL AORTIC ANEURYSM WITHOUT RUPTURE: ICD-10-CM

## 2019-09-04 DIAGNOSIS — M47.812 FACET ARTHROPATHY, CERVICAL: ICD-10-CM

## 2019-09-04 DIAGNOSIS — Z95.1 S/P CABG (CORONARY ARTERY BYPASS GRAFT): ICD-10-CM

## 2019-09-04 DIAGNOSIS — E04.1 THYROID NODULE: ICD-10-CM

## 2019-09-04 DIAGNOSIS — G95.89 MYELOMALACIA OF CERVICAL CORD: ICD-10-CM

## 2019-09-04 DIAGNOSIS — E78.5 HYPERLIPIDEMIA, UNSPECIFIED HYPERLIPIDEMIA TYPE: ICD-10-CM

## 2019-09-04 DIAGNOSIS — R00.1 BRADYCARDIA: ICD-10-CM

## 2019-09-04 DIAGNOSIS — I25.10 CORONARY ARTERY DISEASE INVOLVING NATIVE CORONARY ARTERY OF NATIVE HEART WITHOUT ANGINA PECTORIS: ICD-10-CM

## 2019-09-04 DIAGNOSIS — I20.9 CARDIAC ANGINA: ICD-10-CM

## 2019-09-04 DIAGNOSIS — I10 HYPERTENSION, BENIGN: ICD-10-CM

## 2019-09-04 DIAGNOSIS — Z00.00 ENCOUNTER FOR PREVENTIVE HEALTH EXAMINATION: Primary | ICD-10-CM

## 2019-09-04 DIAGNOSIS — M46.92 INFLAMMATORY SPONDYLOPATHY OF CERVICAL REGION: ICD-10-CM

## 2019-09-04 DIAGNOSIS — G89.29 CHRONIC LOW BACK PAIN WITH SCIATICA, SCIATICA LATERALITY UNSPECIFIED, UNSPECIFIED BACK PAIN LATERALITY: ICD-10-CM

## 2019-09-04 DIAGNOSIS — M48.02 CERVICAL STENOSIS OF SPINE: ICD-10-CM

## 2019-09-04 DIAGNOSIS — F11.90 CHRONIC, CONTINUOUS USE OF OPIOIDS: ICD-10-CM

## 2019-09-04 DIAGNOSIS — I70.0 AORTIC ATHEROSCLEROSIS: ICD-10-CM

## 2019-09-04 DIAGNOSIS — I48.0 PAROXYSMAL ATRIAL FIBRILLATION: ICD-10-CM

## 2019-09-04 DIAGNOSIS — E89.0 POSTOPERATIVE HYPOTHYROIDISM: ICD-10-CM

## 2019-09-04 DIAGNOSIS — M47.812 ARTHROPATHY OF CERVICAL FACET JOINT: ICD-10-CM

## 2019-09-04 PROCEDURE — 99999 PR PBB SHADOW E&M-EST. PATIENT-LVL IV: CPT | Mod: PBBFAC,HCNC,, | Performed by: NURSE PRACTITIONER

## 2019-09-04 PROCEDURE — 3078F DIAST BP <80 MM HG: CPT | Mod: HCNC,CPTII,S$GLB, | Performed by: NURSE PRACTITIONER

## 2019-09-04 PROCEDURE — 99499 UNLISTED E&M SERVICE: CPT | Mod: HCNC,S$GLB,, | Performed by: NURSE PRACTITIONER

## 2019-09-04 PROCEDURE — 3074F SYST BP LT 130 MM HG: CPT | Mod: HCNC,CPTII,S$GLB, | Performed by: NURSE PRACTITIONER

## 2019-09-04 PROCEDURE — G0439 PR MEDICARE ANNUAL WELLNESS SUBSEQUENT VISIT: ICD-10-PCS | Mod: HCNC,S$GLB,, | Performed by: NURSE PRACTITIONER

## 2019-09-04 PROCEDURE — 99999 PR PBB SHADOW E&M-EST. PATIENT-LVL IV: ICD-10-PCS | Mod: PBBFAC,HCNC,, | Performed by: NURSE PRACTITIONER

## 2019-09-04 PROCEDURE — G0439 PPPS, SUBSEQ VISIT: HCPCS | Mod: HCNC,S$GLB,, | Performed by: NURSE PRACTITIONER

## 2019-09-04 PROCEDURE — 3078F PR MOST RECENT DIASTOLIC BLOOD PRESSURE < 80 MM HG: ICD-10-PCS | Mod: HCNC,CPTII,S$GLB, | Performed by: NURSE PRACTITIONER

## 2019-09-04 PROCEDURE — 3074F PR MOST RECENT SYSTOLIC BLOOD PRESSURE < 130 MM HG: ICD-10-PCS | Mod: HCNC,CPTII,S$GLB, | Performed by: NURSE PRACTITIONER

## 2019-09-04 PROCEDURE — 99499 RISK ADDL DX/OHS AUDIT: ICD-10-PCS | Mod: HCNC,S$GLB,, | Performed by: NURSE PRACTITIONER

## 2019-09-04 RX ORDER — OXYCODONE AND ACETAMINOPHEN 5; 325 MG/1; MG/1
1 TABLET ORAL EVERY 6 HOURS PRN
Qty: 90 TABLET | Refills: 0 | Status: CANCELLED | OUTPATIENT
Start: 2019-09-04

## 2019-09-04 NOTE — TELEPHONE ENCOUNTER
Pt has dropped off a handicapped form to be signed. He is requesting a refill of his pain medication. He has an  HRA appointment at 2pm

## 2019-09-04 NOTE — PROGRESS NOTES
"Nando Salguero presented for a  Medicare AWV and comprehensive Health Risk Assessment today. The following components were reviewed and updated:    · Medical history  · Family History  · Social history  · Allergies and Current Medications  · Health Risk Assessment  · Health Maintenance  · Care Team     ** See Completed Assessments for Annual Wellness Visit within the encounter summary.**       The following assessments were completed:  · Living Situation  · CAGE  · Depression Screening  · Timed Get Up and Go  · Whisper Test  · Cognitive Function Screening  ·   ·   ·   · Nutrition Screening  · ADL Screening  · PAQ Screening    Vitals:    09/04/19 1357   BP: 120/70   BP Location: Right arm   Pulse: 60   Weight: 63.8 kg (140 lb 10.5 oz)   Height: 5' 5" (1.651 m)     Body mass index is 23.41 kg/m².  Physical Exam   Constitutional: He is oriented to person, place, and time. He appears well-developed and well-nourished.   HENT:   Head: Normocephalic.   Cardiovascular: Normal rate and regular rhythm.   Pulmonary/Chest: Effort normal and breath sounds normal.   Abdominal: Soft. Bowel sounds are normal.   Musculoskeletal: Normal range of motion. He exhibits no edema.   Neurological: He is alert and oriented to person, place, and time.   Skin: Skin is warm and dry.   Psychiatric: He has a normal mood and affect.   Nursing note and vitals reviewed.        Diagnoses and health risks identified today and associated recommendations/orders:    1. Encounter for preventive health examination  Here for Health Risk Assessment/Annual Wellness Visit.  Health maintenance reviewed and updated. Follow up in one year.    2. Hypertension, benign  Chronic, stable on current medications. Followed by PCP.    3. Aortic atherosclerosis  Chronic, stable on current medications. Noted CXR 3/09/15. Followed by PCP.    4. Coronary artery disease involving native coronary artery of native heart without angina pectoris  Chronic, stable on current " medications. Followed by PCP, Cardiology.    5. S/P CABG (coronary artery bypass graft)  Stable on current medications. Followed by PCP.    6. Cardiac angina  Chronic, stable. Reports no recent chest pain.  Followed by PCP, Cardiology.    7. Paroxysmal atrial fibrillation  Chronic, stable on current medications. Followed by PCP, Cardiology.    8. Bradycardia  Chronic, stable. Followed by PCP, Cardiology.    9. Abdominal aortic aneurysm without rupture  Chronic, stable. Followed by PCP.    10. Hyperlipidemia, unspecified hyperlipidemia type  Chronic, stable on current medication. Followed by PCP.    11. Thyroid nodule  Chronic, stable. Followed by PCP.    12. Postoperative hypothyroidism  Chronic, stable on current medication. Followed by PCP.    13. Inflammatory spondylopathy of cervical region  Chronic, reporting chronic back/neck pain. Followed by PCP.    14. Facet arthropathy, cervical  Chronic, stable on current medications. Followed by PCP.    15. Cervical stenosis of spine  Chronic, reporting chronic back/neck pain. Followed by PCP.    16. Myelomalacia of cervical cord  Chronic, reporting chronic back/neck pain. Followed by PCP.    17. Chronic low back pain with sciatica, sciatica laterality unspecified, unspecified back pain laterality  Chronic, reporting chronic back/neck pain. Followed by PCP.    18. Chronic, continuous use of opioids  Chronic, reports he has been out of medication for several months. Follow up scheduled with PCP     19. Arthropathy of cervical facet joint  Chronic, reporting chronic back/neck pain. Followed by PCP.      Provided Nando with a 5-10 year written screening schedule and personal prevention plan. Recommendations were developed using the USPSTF age appropriate recommendations. Education, counseling, and referrals were provided as needed. After Visit Summary printed and given to patient which includes a list of additional screenings\tests needed.    Follow up in 1 week (on  9/11/2019).with PCP    Sharon Quintanilla NP

## 2019-09-11 ENCOUNTER — OFFICE VISIT (OUTPATIENT)
Dept: INTERNAL MEDICINE | Facility: CLINIC | Age: 84
End: 2019-09-11
Payer: MEDICARE

## 2019-09-11 ENCOUNTER — IMMUNIZATION (OUTPATIENT)
Dept: PHARMACY | Facility: CLINIC | Age: 84
End: 2019-09-11
Payer: MEDICARE

## 2019-09-11 VITALS
SYSTOLIC BLOOD PRESSURE: 120 MMHG | OXYGEN SATURATION: 99 % | DIASTOLIC BLOOD PRESSURE: 58 MMHG | HEIGHT: 65 IN | HEART RATE: 76 BPM | BODY MASS INDEX: 23.76 KG/M2 | WEIGHT: 142.63 LBS

## 2019-09-11 DIAGNOSIS — E89.0 POSTOPERATIVE HYPOTHYROIDISM: ICD-10-CM

## 2019-09-11 DIAGNOSIS — G89.29 CHRONIC LOW BACK PAIN WITH SCIATICA, SCIATICA LATERALITY UNSPECIFIED, UNSPECIFIED BACK PAIN LATERALITY: ICD-10-CM

## 2019-09-11 DIAGNOSIS — M48.02 CERVICAL STENOSIS OF SPINE: Primary | ICD-10-CM

## 2019-09-11 DIAGNOSIS — I10 HYPERTENSION, BENIGN: ICD-10-CM

## 2019-09-11 DIAGNOSIS — I71.40 ABDOMINAL AORTIC ANEURYSM WITHOUT RUPTURE: ICD-10-CM

## 2019-09-11 DIAGNOSIS — M54.40 CHRONIC LOW BACK PAIN WITH SCIATICA, SCIATICA LATERALITY UNSPECIFIED, UNSPECIFIED BACK PAIN LATERALITY: ICD-10-CM

## 2019-09-11 PROCEDURE — 3078F DIAST BP <80 MM HG: CPT | Mod: HCNC,CPTII,S$GLB, | Performed by: INTERNAL MEDICINE

## 2019-09-11 PROCEDURE — 99214 PR OFFICE/OUTPT VISIT, EST, LEVL IV, 30-39 MIN: ICD-10-PCS | Mod: HCNC,S$GLB,, | Performed by: INTERNAL MEDICINE

## 2019-09-11 PROCEDURE — 99999 PR PBB SHADOW E&M-EST. PATIENT-LVL III: ICD-10-PCS | Mod: PBBFAC,HCNC,, | Performed by: INTERNAL MEDICINE

## 2019-09-11 PROCEDURE — 3074F PR MOST RECENT SYSTOLIC BLOOD PRESSURE < 130 MM HG: ICD-10-PCS | Mod: HCNC,CPTII,S$GLB, | Performed by: INTERNAL MEDICINE

## 2019-09-11 PROCEDURE — 3074F SYST BP LT 130 MM HG: CPT | Mod: HCNC,CPTII,S$GLB, | Performed by: INTERNAL MEDICINE

## 2019-09-11 PROCEDURE — 1101F PT FALLS ASSESS-DOCD LE1/YR: CPT | Mod: HCNC,CPTII,S$GLB, | Performed by: INTERNAL MEDICINE

## 2019-09-11 PROCEDURE — 99214 OFFICE O/P EST MOD 30 MIN: CPT | Mod: HCNC,S$GLB,, | Performed by: INTERNAL MEDICINE

## 2019-09-11 PROCEDURE — 99999 PR PBB SHADOW E&M-EST. PATIENT-LVL III: CPT | Mod: PBBFAC,HCNC,, | Performed by: INTERNAL MEDICINE

## 2019-09-11 PROCEDURE — 1101F PR PT FALLS ASSESS DOC 0-1 FALLS W/OUT INJ PAST YR: ICD-10-PCS | Mod: HCNC,CPTII,S$GLB, | Performed by: INTERNAL MEDICINE

## 2019-09-11 PROCEDURE — 3078F PR MOST RECENT DIASTOLIC BLOOD PRESSURE < 80 MM HG: ICD-10-PCS | Mod: HCNC,CPTII,S$GLB, | Performed by: INTERNAL MEDICINE

## 2019-09-11 RX ORDER — OXYCODONE AND ACETAMINOPHEN 5; 325 MG/1; MG/1
1 TABLET ORAL EVERY 12 HOURS PRN
Qty: 90 TABLET | Refills: 0 | Status: SHIPPED | OUTPATIENT
Start: 2019-09-11 | End: 2020-05-06 | Stop reason: SDUPTHER

## 2019-09-11 NOTE — PROGRESS NOTES
Subjective:       Patient ID: Nando Salguero is a 84 y.o. male.    Chief Complaint: Medication Problem    HPI:  Patient with history of dyslipidemia anxiety, cervical spinal stenosis, chronic arthritis and chronic use of opioids comes in for refill of medication.  He is averaging less than 1 Percocet per day.  He tries to stay active.  Hips are giving him some mild trouble but knees are doing better after prior surgeries.  He is not interested in injections in the hips or neck stating he is too old to do significant surgery or injections.  We will update some labs next visit.  He has not escalated the dose of any of his meds  We will get him a flu shot today.    Review of Systems   Constitutional: Negative for chills, fatigue, fever and unexpected weight change.   HENT: Negative for nosebleeds and trouble swallowing.    Eyes: Negative for pain and visual disturbance.   Respiratory: Negative for cough, shortness of breath and wheezing.    Cardiovascular: Negative for chest pain and palpitations.   Gastrointestinal: Negative for abdominal pain, constipation, diarrhea, nausea and vomiting.   Genitourinary: Negative for difficulty urinating and hematuria.   Musculoskeletal: Positive for arthralgias, back pain, gait problem, joint swelling, neck pain and neck stiffness.   Skin: Negative for rash.   Neurological: Negative for dizziness and headaches.   Hematological: Does not bruise/bleed easily.   Psychiatric/Behavioral: Negative for dysphoric mood, sleep disturbance and suicidal ideas.       Objective:      Physical Exam   Constitutional: He is oriented to person, place, and time. He appears well-developed and well-nourished. No distress.   HENT:   Head: Normocephalic and atraumatic.   Mouth/Throat: Oropharynx is clear and moist. No oropharyngeal exudate.   TM's clear, pharynx clear   Eyes: Pupils are equal, round, and reactive to light. Conjunctivae and EOM are normal. No scleral icterus.   Neck: Normal range of  motion. Neck supple. No thyromegaly present.   No supraclavicular nodes palpated   Cardiovascular: Normal rate, regular rhythm and normal heart sounds.   No murmur heard.  Pulmonary/Chest: Effort normal and breath sounds normal. He has no wheezes.   Abdominal: Soft. Bowel sounds are normal.   Musculoskeletal: He exhibits tenderness and deformity (knees, hands). He exhibits no edema.   Lymphadenopathy:     He has no cervical adenopathy.   Neurological: He is alert and oriented to person, place, and time.   Skin: No rash noted. No erythema. No pallor.   Psychiatric: He has a normal mood and affect. His behavior is normal.       Assessment:       1. Cervical stenosis of spine    2. Postoperative hypothyroidism    3. Hypertension, benign    4. Abdominal aortic aneurysm without rupture    5. Chronic low back pain with sciatica, sciatica laterality unspecified, unspecified back pain laterality        Plan:       Nando was seen today for medication problem.    Diagnoses and all orders for this visit:    Cervical stenosis of spine  -     Lipid panel; Future  -     TSH; Future  -     CBC auto differential; Future  -     Comprehensive metabolic panel; Future    Postoperative hypothyroidism  -     Lipid panel; Future  -     TSH; Future  -     CBC auto differential; Future  -     Comprehensive metabolic panel; Future    Hypertension, benign  -     Lipid panel; Future  -     TSH; Future  -     CBC auto differential; Future  -     Comprehensive metabolic panel; Future    Abdominal aortic aneurysm without rupture  -     Lipid panel; Future  -     TSH; Future  -     CBC auto differential; Future  -     Comprehensive metabolic panel; Future    Chronic low back pain with sciatica, sciatica laterality unspecified, unspecified back pain laterality  -     Lipid panel; Future  -     TSH; Future  -     CBC auto differential; Future  -     Comprehensive metabolic panel; Future    Other orders  -     oxyCODONE-acetaminophen (PERCOCET)  5-325 mg per tablet; Take 1 tablet by mouth every 12 (twelve) hours as needed for Pain.

## 2019-09-24 DIAGNOSIS — I10 HYPERTENSION, BENIGN: ICD-10-CM

## 2019-09-25 PROBLEM — D72.829 LEUKOCYTOSIS: Status: ACTIVE | Noted: 2019-09-25

## 2019-09-25 PROBLEM — K81.0 ACUTE CHOLECYSTITIS: Status: ACTIVE | Noted: 2019-09-25

## 2019-09-26 PROBLEM — K80.63 CALCULUS OF GALLBLADDER AND BILE DUCT WITH ACUTE CHOLECYSTITIS, WITH OBSTRUCTION: Status: ACTIVE | Noted: 2019-09-26

## 2019-09-28 PROBLEM — D72.829 LEUKOCYTOSIS: Status: RESOLVED | Noted: 2019-09-25 | Resolved: 2019-09-28

## 2019-09-30 ENCOUNTER — TELEPHONE (OUTPATIENT)
Dept: INTERNAL MEDICINE | Facility: CLINIC | Age: 84
End: 2019-09-30

## 2019-09-30 RX ORDER — LISINOPRIL 10 MG/1
TABLET ORAL
Qty: 90 TABLET | Refills: 3 | Status: SHIPPED | OUTPATIENT
Start: 2019-09-30 | End: 2019-11-18 | Stop reason: SDUPTHER

## 2019-09-30 NOTE — TELEPHONE ENCOUNTER
Metoprolol was started due to Afib. He was supposed to have a Cardiology follow up for this as an outpatient according to the discharge summary.   He is to monitor his blood pressure. The discharge summary says restart the Amlodipine and lisinopril when blood pressure allowed. If BP is low or patient is weak with BP then continue only the metoprolol for now.

## 2019-09-30 NOTE — TELEPHONE ENCOUNTER
Pt had emergency gallbladder surgery. Pt was given metoprolol in the hospital bu you prescribed the pt lisinopril the pt's wife wants to know which medication he should be taking

## 2019-09-30 NOTE — TELEPHONE ENCOUNTER
----- Message from Ashley Williamson sent at 9/30/2019  3:54 PM CDT -----  Contact: Robina Wife [phone]  Patient wife would like to know which medication should the pt use out of metoprolol tartrate (LOPRESSOR) 25 MG tablet and lisinopril 10 MG tablet, please advise.

## 2019-10-01 NOTE — TELEPHONE ENCOUNTER
Spoke to pt wife. She verbalized understanding. Pt has an appt with  Oct 08 and will discuss the cardiology appt at the visit per wife request

## 2019-10-05 PROBLEM — A41.9 SEPSIS: Status: ACTIVE | Noted: 2019-10-05

## 2019-10-05 PROBLEM — E63.8 INADEQUATE DIETARY INTAKE OF PROTEIN: Status: ACTIVE | Noted: 2019-10-05

## 2019-10-15 RX ORDER — ATORVASTATIN CALCIUM 40 MG/1
TABLET, FILM COATED ORAL
Qty: 90 TABLET | Refills: 3 | Status: SHIPPED | OUTPATIENT
Start: 2019-10-15 | End: 2020-10-05 | Stop reason: SDUPTHER

## 2019-11-21 ENCOUNTER — OFFICE VISIT (OUTPATIENT)
Dept: ENDOCRINOLOGY | Facility: CLINIC | Age: 84
End: 2019-11-21
Payer: MEDICARE

## 2019-11-21 ENCOUNTER — OFFICE VISIT (OUTPATIENT)
Dept: CARDIOLOGY | Facility: CLINIC | Age: 84
End: 2019-11-21
Payer: MEDICARE

## 2019-11-21 VITALS
BODY MASS INDEX: 23.54 KG/M2 | OXYGEN SATURATION: 100 % | HEIGHT: 65 IN | DIASTOLIC BLOOD PRESSURE: 87 MMHG | HEART RATE: 81 BPM | WEIGHT: 141.31 LBS | SYSTOLIC BLOOD PRESSURE: 167 MMHG

## 2019-11-21 VITALS
HEART RATE: 82 BPM | HEIGHT: 65 IN | SYSTOLIC BLOOD PRESSURE: 164 MMHG | BODY MASS INDEX: 23.21 KG/M2 | WEIGHT: 139.31 LBS | DIASTOLIC BLOOD PRESSURE: 80 MMHG

## 2019-11-21 DIAGNOSIS — I48.0 PAROXYSMAL ATRIAL FIBRILLATION: ICD-10-CM

## 2019-11-21 DIAGNOSIS — E89.0 POSTOPERATIVE HYPOTHYROIDISM: Primary | ICD-10-CM

## 2019-11-21 DIAGNOSIS — E78.2 MIXED HYPERLIPIDEMIA: ICD-10-CM

## 2019-11-21 DIAGNOSIS — E89.0 POSTOPERATIVE HYPOTHYROIDISM: ICD-10-CM

## 2019-11-21 DIAGNOSIS — E03.9 HYPOTHYROIDISM, UNSPECIFIED TYPE: ICD-10-CM

## 2019-11-21 DIAGNOSIS — Z95.1 S/P CABG (CORONARY ARTERY BYPASS GRAFT): ICD-10-CM

## 2019-11-21 DIAGNOSIS — I25.810 CORONARY ARTERY DISEASE INVOLVING CORONARY BYPASS GRAFT OF NATIVE HEART WITHOUT ANGINA PECTORIS: ICD-10-CM

## 2019-11-21 DIAGNOSIS — G89.29 CHRONIC BILATERAL LOW BACK PAIN WITH SCIATICA, SCIATICA LATERALITY UNSPECIFIED: ICD-10-CM

## 2019-11-21 DIAGNOSIS — I10 HYPERTENSION, BENIGN: ICD-10-CM

## 2019-11-21 DIAGNOSIS — M54.40 CHRONIC BILATERAL LOW BACK PAIN WITH SCIATICA, SCIATICA LATERALITY UNSPECIFIED: ICD-10-CM

## 2019-11-21 DIAGNOSIS — I71.40 ABDOMINAL AORTIC ANEURYSM WITHOUT RUPTURE: Primary | ICD-10-CM

## 2019-11-21 PROCEDURE — 1101F PT FALLS ASSESS-DOCD LE1/YR: CPT | Mod: HCNC,CPTII,GC,S$GLB | Performed by: STUDENT IN AN ORGANIZED HEALTH CARE EDUCATION/TRAINING PROGRAM

## 2019-11-21 PROCEDURE — 1126F PR PAIN SEVERITY QUANTIFIED, NO PAIN PRESENT: ICD-10-PCS | Mod: HCNC,S$GLB,, | Performed by: INTERNAL MEDICINE

## 2019-11-21 PROCEDURE — 3079F DIAST BP 80-89 MM HG: CPT | Mod: HCNC,CPTII,GC,S$GLB | Performed by: STUDENT IN AN ORGANIZED HEALTH CARE EDUCATION/TRAINING PROGRAM

## 2019-11-21 PROCEDURE — 1101F PR PT FALLS ASSESS DOC 0-1 FALLS W/OUT INJ PAST YR: ICD-10-PCS | Mod: HCNC,CPTII,S$GLB, | Performed by: INTERNAL MEDICINE

## 2019-11-21 PROCEDURE — 99999 PR PBB SHADOW E&M-EST. PATIENT-LVL V: ICD-10-PCS | Mod: PBBFAC,HCNC,GC, | Performed by: STUDENT IN AN ORGANIZED HEALTH CARE EDUCATION/TRAINING PROGRAM

## 2019-11-21 PROCEDURE — 3077F PR MOST RECENT SYSTOLIC BLOOD PRESSURE >= 140 MM HG: ICD-10-PCS | Mod: HCNC,CPTII,S$GLB, | Performed by: INTERNAL MEDICINE

## 2019-11-21 PROCEDURE — 1159F PR MEDICATION LIST DOCUMENTED IN MEDICAL RECORD: ICD-10-PCS | Mod: HCNC,GC,S$GLB, | Performed by: STUDENT IN AN ORGANIZED HEALTH CARE EDUCATION/TRAINING PROGRAM

## 2019-11-21 PROCEDURE — 3077F PR MOST RECENT SYSTOLIC BLOOD PRESSURE >= 140 MM HG: ICD-10-PCS | Mod: HCNC,CPTII,GC,S$GLB | Performed by: STUDENT IN AN ORGANIZED HEALTH CARE EDUCATION/TRAINING PROGRAM

## 2019-11-21 PROCEDURE — 1125F PR PAIN SEVERITY QUANTIFIED, PAIN PRESENT: ICD-10-PCS | Mod: HCNC,GC,S$GLB, | Performed by: STUDENT IN AN ORGANIZED HEALTH CARE EDUCATION/TRAINING PROGRAM

## 2019-11-21 PROCEDURE — 1101F PT FALLS ASSESS-DOCD LE1/YR: CPT | Mod: HCNC,CPTII,S$GLB, | Performed by: INTERNAL MEDICINE

## 2019-11-21 PROCEDURE — 1126F AMNT PAIN NOTED NONE PRSNT: CPT | Mod: HCNC,S$GLB,, | Performed by: INTERNAL MEDICINE

## 2019-11-21 PROCEDURE — 3079F PR MOST RECENT DIASTOLIC BLOOD PRESSURE 80-89 MM HG: ICD-10-PCS | Mod: HCNC,CPTII,GC,S$GLB | Performed by: STUDENT IN AN ORGANIZED HEALTH CARE EDUCATION/TRAINING PROGRAM

## 2019-11-21 PROCEDURE — 99999 PR PBB SHADOW E&M-EST. PATIENT-LVL V: CPT | Mod: PBBFAC,HCNC,GC, | Performed by: STUDENT IN AN ORGANIZED HEALTH CARE EDUCATION/TRAINING PROGRAM

## 2019-11-21 PROCEDURE — 99204 OFFICE O/P NEW MOD 45 MIN: CPT | Mod: HCNC,GC,S$GLB, | Performed by: STUDENT IN AN ORGANIZED HEALTH CARE EDUCATION/TRAINING PROGRAM

## 2019-11-21 PROCEDURE — 99213 PR OFFICE/OUTPT VISIT, EST, LEVL III, 20-29 MIN: ICD-10-PCS | Mod: HCNC,S$GLB,, | Performed by: INTERNAL MEDICINE

## 2019-11-21 PROCEDURE — 1125F AMNT PAIN NOTED PAIN PRSNT: CPT | Mod: HCNC,GC,S$GLB, | Performed by: STUDENT IN AN ORGANIZED HEALTH CARE EDUCATION/TRAINING PROGRAM

## 2019-11-21 PROCEDURE — 99499 UNLISTED E&M SERVICE: CPT | Mod: HCNC,S$GLB,, | Performed by: INTERNAL MEDICINE

## 2019-11-21 PROCEDURE — 3079F DIAST BP 80-89 MM HG: CPT | Mod: HCNC,CPTII,S$GLB, | Performed by: INTERNAL MEDICINE

## 2019-11-21 PROCEDURE — 3079F PR MOST RECENT DIASTOLIC BLOOD PRESSURE 80-89 MM HG: ICD-10-PCS | Mod: HCNC,CPTII,S$GLB, | Performed by: INTERNAL MEDICINE

## 2019-11-21 PROCEDURE — 3077F SYST BP >= 140 MM HG: CPT | Mod: HCNC,CPTII,GC,S$GLB | Performed by: STUDENT IN AN ORGANIZED HEALTH CARE EDUCATION/TRAINING PROGRAM

## 2019-11-21 PROCEDURE — 99999 PR PBB SHADOW E&M-EST. PATIENT-LVL IV: CPT | Mod: PBBFAC,HCNC,, | Performed by: INTERNAL MEDICINE

## 2019-11-21 PROCEDURE — 1159F MED LIST DOCD IN RCRD: CPT | Mod: HCNC,S$GLB,, | Performed by: INTERNAL MEDICINE

## 2019-11-21 PROCEDURE — 99213 OFFICE O/P EST LOW 20 MIN: CPT | Mod: HCNC,S$GLB,, | Performed by: INTERNAL MEDICINE

## 2019-11-21 PROCEDURE — 3077F SYST BP >= 140 MM HG: CPT | Mod: HCNC,CPTII,S$GLB, | Performed by: INTERNAL MEDICINE

## 2019-11-21 PROCEDURE — 99204 PR OFFICE/OUTPT VISIT, NEW, LEVL IV, 45-59 MIN: ICD-10-PCS | Mod: HCNC,GC,S$GLB, | Performed by: STUDENT IN AN ORGANIZED HEALTH CARE EDUCATION/TRAINING PROGRAM

## 2019-11-21 PROCEDURE — 1101F PR PT FALLS ASSESS DOC 0-1 FALLS W/OUT INJ PAST YR: ICD-10-PCS | Mod: HCNC,CPTII,GC,S$GLB | Performed by: STUDENT IN AN ORGANIZED HEALTH CARE EDUCATION/TRAINING PROGRAM

## 2019-11-21 PROCEDURE — 1159F PR MEDICATION LIST DOCUMENTED IN MEDICAL RECORD: ICD-10-PCS | Mod: HCNC,S$GLB,, | Performed by: INTERNAL MEDICINE

## 2019-11-21 PROCEDURE — 99499 RISK ADDL DX/OHS AUDIT: ICD-10-PCS | Mod: HCNC,S$GLB,, | Performed by: INTERNAL MEDICINE

## 2019-11-21 PROCEDURE — 1159F MED LIST DOCD IN RCRD: CPT | Mod: HCNC,GC,S$GLB, | Performed by: STUDENT IN AN ORGANIZED HEALTH CARE EDUCATION/TRAINING PROGRAM

## 2019-11-21 PROCEDURE — 99999 PR PBB SHADOW E&M-EST. PATIENT-LVL IV: ICD-10-PCS | Mod: PBBFAC,HCNC,, | Performed by: INTERNAL MEDICINE

## 2019-11-21 NOTE — ASSESSMENT & PLAN NOTE
[facility] s/p total thyroidectomy in 2015    -Most recent labs were done during hospitalization, and thus might not represent current thyroid function.   -Will repeat TSH & FT4  -Advised pt to take levothyroxine on an empty stomach and wait at least 30 min before eating breakfast, and at least 4 hours before taking calcium supplements.  -F/U in 1 year

## 2019-11-21 NOTE — PATIENT INSTRUCTIONS
Estimated Calcium Content of Foods:  Produce  Serving Size Estimated Calcium*    [place] greens, frozen 8 oz 360 mg   Broccoli karen 8 oz 200 mg   Kale, frozen 8 oz 180 mg   Soy Beans, green, boiled 8 oz 175 mg   Bok [place], cooked, boiled 8 oz 160 mg   Figs, dried 2 figs 65 mg   Broccoli, fresh, cooked 8 oz 60 mg   Oranges 1 whole 55 mg   Seafood Serving Size Estimated Calcium*    Sardines, canned with bones 3 oz 325 mg   [place], canned with bones 3 oz 180 mg   Shrimp, canned 3 oz 125 mg   Dairy Serving Size Estimated Calcium*    Ricotta, part-skim 4 oz 335 mg   Yogurt, plain, low-fat 6 oz 310 mg   Milk, skim, low-fat, whole 8 oz 300 mg   Yogurt with fruit, low-fat 6 oz 260 mg   Mozzarella, part-skim 1 oz 210 mg   Cheddar 1 oz 205 mg   Yogurt, Greek 6 oz 200 mg   American Cheese 1 oz 195 mg   Feta Cheese 4 oz 140 mg   Cottage Cheese, 2% 4 oz 105 mg   Frozen yogurt, vanilla 8 oz 105 mg   Ice Cream, vanilla 8 oz 85 mg   Parmesan 1 tbsp 55 mg   Fortified Food Serving Size Estimated Calcium*   [place] milk, rice milk or soy milk, fortified 8 oz 300 mg   Orange juice and other fruit juices, fortified 8 oz 300 mg   Tofu, prepared with calcium 4 oz 205 mg   Waffle, frozen, fortified 2 pieces 200 mg   Oatmeal, fortified 1 packet 140 mg   English muffin, fortified 1 muffin 100 mg   Cereal, fortified 8 oz 100-1,000 mg   Other Serving Size Estimated Calcium*   Mac & cheese, frozen 1 package 325 mg   Pizza, cheese, frozen 1 serving 115 mg   Pudding, chocolate, prepared with 2% milk 4 oz 160 mg   Beans, baked, canned 4 oz 160 mg   *The calcium content listed for most foods is estimated and can vary due to multiple factors. Check the food label to determine how much calcium is in a particular product.  If you read the nutrition label for a food source, it lists the % calcium in that food.  For an 8 oz glass of milk, for example, the label states calcium 30%.  This is equivalent to 300 mg of calcium (multiply the listed number by  10).   **Table from the [employer]opor[employer] Foundation      -Take calcium supplements at night  -Take levothyroxine every morning on an empty stomach.   -Labs tomorrow morning.

## 2019-11-21 NOTE — ASSESSMENT & PLAN NOTE
BP is slightly elevated.   Pt reports that there have been some medication changes this week by PCP/ cardiology.  Continue current medications

## 2019-11-21 NOTE — PROGRESS NOTES
Subjective:      Patient ID: Nando Salguero is a 84 y.o. male.    Chief Complaint:  Consult      History of Present Illness  An 85 yo man with diagnosis of MNG s/o total thyroidectomy.   He also has medical dx of prostate cancer s/p surgery.   No prior h/o chemotherapy and radiation therapy.    Had an incidental finding of MNG earlier 2015 when he went to the ED for cervical spine problems.   Had FNA of right thyroid nodule in 2/2015, which showed FLUS.   Underwent total thyroidectomy 8/14/15.  Has been on Synthroid since then.     Currently on Synthroid 125 mcg, every morning, on an empty stomach.   No changes in the dose of Synthroid recently.    Reports weight loss, cold intolerance.    Denies constipation, significant hair loss.     Was recently hospitalized in October, 2019, for cholecystectomy. The course was complicated by pneumonia and sepsis. Was discharged at the end of Oct.     Review of Systems   Constitutional: Negative for unexpected weight change.   Eyes: Negative for visual disturbance.   Respiratory: Negative for shortness of breath.    Cardiovascular: Negative for chest pain.   Gastrointestinal: Negative for abdominal pain.   Genitourinary: Negative for urgency.   Musculoskeletal: Negative for arthralgias.   Skin: Negative for wound.   Neurological: Negative for headaches.   Hematological: Does not bruise/bleed easily.   Psychiatric/Behavioral: Negative for sleep disturbance.       Objective:   Physical Exam   Constitutional: He is oriented to person, place, and time. He appears well-developed and well-nourished.   HENT:   Head: Normocephalic and atraumatic.   Neck: Neck supple. No thyromegaly present.   Cardiovascular: Normal rate, regular rhythm and normal heart sounds.   Pulmonary/Chest: Effort normal. No respiratory distress.   Abdominal: Soft. There is no tenderness.   Neurological: He is alert and oriented to person, place, and time.   Skin: Skin is warm. No rash noted.   Psychiatric: He  "has a normal mood and affect. His behavior is normal.       Wt Readings from Last 1 Encounters:   11/21/19 1031 63.2 kg (139 lb 5.3 oz)     BP Readings from Last 3 Encounters:   11/21/19 (!) 164/80   11/21/19 (!) 167/87   11/18/19 132/70     BP (!) 164/80   Pulse 82   Ht 5' 5" (1.651 m)   Wt 63.2 kg (139 lb 5.3 oz)   BMI 23.19 kg/m²       Lab Review:   Lab Results   Component Value Date    HGBA1C 5.8 01/13/2015     Lab Results   Component Value Date    CHOL 111 (L) 10/09/2019    HDL 21 (L) 10/09/2019    LDLCALC 67 10/09/2019    TRIG 67 10/09/2019    CHOLHDL 18.9 (L) 10/09/2019     Lab Results   Component Value Date     10/07/2019    K 3.6 10/07/2019     10/07/2019    CO2 24 10/07/2019     (H) 10/07/2019    BUN 13 10/07/2019    CREATININE 0.60 (L) 10/07/2019    CALCIUM 8.8 10/07/2019    PROT 6.1 (L) 10/07/2019    ALBUMIN 3.1 (L) 10/07/2019    BILITOT 1.1 10/07/2019    ALKPHOS 105 10/07/2019    AST 34 10/07/2019    ALT 58 (H) 10/07/2019    ANIONGAP 8 10/11/2018    ESTGFRAFRICA >60 10/07/2019    EGFRNONAA >60 10/07/2019    TSH 14.00 (H) 10/09/2019         Assessment and Plan     Postoperative hypothyroidism  MNG s/p total thyroidectomy in 2015    -Most recent labs were done during hospitalization, and thus might not represent current thyroid function.   -Will repeat TSH & FT4  -Advised pt to take levothyroxine on an empty stomach and wait at least 30 min before eating breakfast, and at least 4 hours before taking calcium supplements.  -F/U in 1 year    Hyperlipidemia  On Atorvastatin    Hypertension, benign  BP is slightly elevated.   Pt reports that there have been some medication changes this week by PCP/ cardiology.  Continue current medications    Coronary artery disease involving coronary bypass graft of native heart without angina pectoris  Defer to cardiology        Discussed with Dr. Beyer  "

## 2019-11-21 NOTE — PROGRESS NOTES
Interventional Cardiology Clinic Note  Reason for Visit: AAA  Referring Physician: Dr. Gonzalez    HPI:   Nando Salguero is a 84 y.o. male who presents for Hypertension    Mr. Salguero is a 80 y/o gentleman with a history of HTN, HLP, PAfib, CAD s/p CABG (LIMA-LAD, SVG-PDA, and occluded SVG-Diag) with multiple stents in his LAD, Diag, LCx, and SVG-PDA. His last PCI was on 5/26/14 and involved a Culotte technique for LM bifurcation ISR. He was last seen in this clinic on 3/9/17.    Within the last two months he had two hospital admissions; the first was for acute cholecystitis and the second was for pneumonia and sepsis. He was admitted to [facility] from 9/25/19-9/30/19 and underwent ERCP and then lap danyell which went without complication. He stayed in Afib during admission. Cardiologist was consulted for pre-op clearance and they recommended no anticoagulation for Afib given history of GI bleed and risk for falls.     He was then admitted from 10/4/19-10/7/19 for sepsis and RLL pneumonia. He underwent chest and abdominal CT and his infrarenal AAA was measured at 4.0 cm. He was treated with antibiotics and set up to establish care with Dr. Gonzalez. He then underwent an abdominal aortic US on 11/11/19 which showed that the infrarenal AAA had increased to 4.5 cm. He was referred here for management of expanding AAA.     He otherwise has been doing well. He states that from his last clinic visit in 2017 up until his first admission in 9/2019, he had been staying active and did not have any anginal symptoms. He stayed active by mowing the lawn, painting, and power washing the house. He does complains of chronic back, neck, and hip pain for years. He ambulates with a cane. He denies chest pain, SOB, ABRAMS, dizziness, syncope, palpitations, abdominal pain (outside of episode of cholecystitis), and bleeding episodes. He has been compliant with ASA, statin, and antihypertensives. He is not  compliant with low sodium diet. He checks his blood pressure at home and it runs 150-160 systolic and 80s diastolic.     ROS:    Constitution: Negative for diaphoresis and malaise/fatigue.   HENT: Negative for nosebleeds.  Cardiovascular: Negative for chest pain, claudication, dyspnea on exertion, near-syncope, orthopnea, palpitations, paroxysmal nocturnal dyspnea and syncope. Positive for leg swelling.   Respiratory: Negative for shortness of breath, snoring and wheezing.    Hematologic/Lymphatic: Negative for bleeding problem. Does not bruise/bleed easily.   Musculoskeletal: Positive for back pain, neck pain, and b/l hip pain.   Gastrointestinal: Negative for bloating, abdominal pain, nausea and vomiting.   Neurological: Negative for dizziness, headaches and light-headedness.   PMH:     Past Medical History:   Diagnosis Date    AAA (abdominal aortic aneurysm)     Arthritis     Cancer     CHF (congestive heart failure)     Chronic back pain     requiring long term pain meds.     Coronary artery disease     Encounter for blood transfusion     Hyperlipidemia     Hypertension     Leukocytosis 9/25/2019    Paroxysmal atrial fibrillation 4/2/2014    Prostate cancer     Thyroid nodule 2/10/2015     Past Surgical History:   Procedure Laterality Date    ABDOMINAL SURGERY      BACK SURGERY      cardiac catherization Left 5/26/15    CARDIAC CATHETERIZATION  2005    CARDIAC CATHETERIZATION  1997    CARDIAC CATHETERIZATION  1996    CARDIAC CATHETERIZATION  2012    CORONARY ANGIOPLASTY      CORONARY ARTERY BYPASS GRAFT  04/18/1995    x4    ERCP N/A 9/27/2019    Procedure: ERCP (ENDOSCOPIC RETROGRADE CHOLANGIOPANCREATOGRAPHY);  Surgeon: Burt Kim MD;  Location: [facility] ENDO;  Service: Endoscopy;  Laterality: N/A;    JOINT REPLACEMENT      LAPAROSCOPIC CHOLECYSTECTOMY N/A 9/28/2019    Procedure: CHOLECYSTECTOMY, LAPAROSCOPIC;  Surgeon: Donald Schwab, MD;  Location: [facility] OR;  Service: General;   Laterality: N/A;    PROSTATE SURGERY      SPINE SURGERY      THYROIDECTOMY  8/14/15     Allergies:     Review of patient's allergies indicates:   Allergen Reactions    Sulfamethoxazole-trimethoprim      Other reaction(s): BRADYCARDIA     Medications:     Current Outpatient Medications on File Prior to Visit   Medication Sig Dispense Refill    albuterol-ipratropium (DUO-NEB) 2.5 mg-0.5 mg/3 mL nebulizer solution Take 3 mLs by nebulization every 6 (six) hours while awake. Rescue 1 Box 0    aspirin (ADULT LOW DOSE ASPIRIN) 81 MG EC tablet Take 1 tablet by mouth Daily.      atorvastatin (LIPITOR) 40 MG tablet TAKE 1 TABLET BY MOUTH EVERY DAY 90 tablet 3    calcium carbonate (OS-GREGORY) 500 mg calcium (1,250 mg) tablet 2 tabs po TID x 1 week, then 2 tabs po BID x 1 week, then 2 tabs po q daily x  1 week, then 1 tab po q day (Patient taking differently: 1 tablet once daily. ) 161 tablet 6    DULoxetine (CYMBALTA) 30 MG capsule Take 1 capsule (30 mg total) by mouth once daily. 90 capsule 11    ferrous gluconate (FERGON) 324 MG tablet TAKE ONE TABLET BY MOUTH ONCE DAILY WITH BREAKFAST (Patient taking differently: Take 324 mg by mouth daily with breakfast. ) 30 tablet 0    levothyroxine (SYNTHROID) 125 MCG tablet TAKE 1 TABLET BY MOUTH IN THE MORNING BEFORE BREAKFAST (Patient taking differently: Take 125 mcg by mouth before breakfast. TAKE 1 TABLET BY MOUTH IN THE MORNING BEFORE BREAKFAST) 90 tablet 4    lidocaine-prilocaine (EMLA) cream       lisinopril (PRINIVIL,ZESTRIL) 20 MG tablet Take 1 tablet (20 mg total) by mouth once daily. 90 tablet 1    metoprolol tartrate (LOPRESSOR) 25 MG tablet Take 0.5 tablets (12.5 mg total) by mouth 2 (two) times daily. 30 tablet 11    oxyCODONE-acetaminophen (PERCOCET) 5-325 mg per tablet Take 1 tablet by mouth every 12 (twelve) hours as needed for Pain. 90 tablet 0    ranitidine (ZANTAC) 150 MG tablet Take 150 mg by mouth nightly.      nitroGLYCERIN (NITROSTAT) 0.4 MG SL  "tablet Place 1 tablet (0.4 mg total) under the tongue every 5 (five) minutes as needed for Chest pain. (Patient not taking: Reported on 11/21/2019) 25 tablet 3     No current facility-administered medications on file prior to visit.      Social History:     Social History     Tobacco Use    Smoking status: Never Smoker    Smokeless tobacco: Never Used   Substance Use Topics    Alcohol use: No     Family History:     Family History   Problem Relation Age of Onset    Cancer Mother     Heart attack Mother     Hypertension Mother     Cancer Father     Heart attack Father     Hypertension Father     Stroke Brother     Cancer Brother     No Known Problems Sister     No Known Problems Daughter     No Known Problems Maternal Grandmother     No Known Problems Maternal Grandfather     No Known Problems Paternal Grandmother     No Known Problems Paternal Grandfather     No Known Problems Maternal Aunt     No Known Problems Maternal Uncle     No Known Problems Paternal Aunt     No Known Problems Paternal Uncle     Anemia Neg Hx     Arrhythmia Neg Hx     Asthma Neg Hx     Clotting disorder Neg Hx     Fainting Neg Hx     Heart disease Neg Hx     Heart failure Neg Hx     Hyperlipidemia Neg Hx     Melanoma Neg Hx      Physical Exam:   BP (!) 167/87 (BP Location: Right arm, Patient Position: Sitting, BP Method: Large (Automatic))   Pulse 81   Ht 5' 5" (1.651 m)   Wt 64.1 kg (141 lb 5 oz)   SpO2 100%   BMI 23.52 kg/m²      Constitutional: NAD, conversant  HEENT: Sclera anicteric, PERRLA, EOMI  Neck: No JVD, no carotid bruits  CV: Irregularly irregular, no murmur, normal S1/S2  Vascular:  L radial  2+,   R radial  2+,     L posterior tibial  1+,  R posterior tibial  1+    L dorsalis pedis  2+, R dorsalis pedis  2+     Pulm: CTAB, no wheezes, rales, or ronchi  GI: Abdomen soft, NTND, +BS  Extremities: Trace LE edema, warm and well perfused  Skin: No ecchymosis, erythema, or ulcers  Psych: AOx3, " appropriate affect  Neuro: No gross deficits    Labs:     Lab Results   Component Value Date     10/07/2019    K 3.6 10/07/2019     10/07/2019    CO2 24 10/07/2019    BUN 13 10/07/2019    CREATININE 0.60 (L) 10/07/2019    ANIONGAP 8 10/11/2018     Lab Results   Component Value Date    HGBA1C 5.8 2015     Lab Results   Component Value Date     (H) 2014     (H) 2012    Lab Results   Component Value Date    WBC 9.80 10/09/2019    HGB 11.2 (L) 10/09/2019    HCT 33.6 (L) 10/09/2019    HCT 26 (L) 2014     (H) 10/09/2019    GRAN 9.6 (H) 10/07/2019    GRAN 82.2 (H) 10/07/2019     Lab Results   Component Value Date    CHOL 111 (L) 10/09/2019    HDL 21 (L) 10/09/2019    LDLCALC 67 10/09/2019    TRIG 67 10/09/2019          Imagin. US Abdominal aorta (10/09/19)  Infrarenal abdominal aortic aneurysm measuring 4.5 cm in greatest axial dimension.    EKG (19): Atrial fibrillation. Heart rate 79. No acute STT changes.     Assessment:     1. Abdominal aortic aneurysm without rupture    2. S/P CABG (coronary artery bypass graft)    3. Paroxysmal atrial fibrillation    4. Hypertension, benign    5. Mixed hyperlipidemia    6. Coronary artery disease involving coronary bypass graft of native heart without angina pectoris    7. Postoperative hypothyroidism    8. Hypothyroidism, unspecified type    9. Chronic bilateral low back pain with sciatica, sciatica laterality unspecified      Plan:     Abdominal aortic aneurysm without rupture  -- Infrarenal AAA measured 4.5 cm on US on 19, increased from 4.0 cm on abd CT on 10/4/19. Since his AAA has grown 0.5 cm within two months, will refer to Dr. Danielle for EVAR.     S/P CABG (coronary artery bypass graft)  -- Asymptomatic. No angina or heart failure symptoms. His troponin was negative during recent admission and EKG did not show any acute STT changes.  -- Last stress test was in . Will order PET stress test to  evaluate for areas of ischemia prior to any invasive procedures given his history of severe CAD.  -- Continue medical management with ASA and statin. His Nitroglycerin Rx was renewed upon discharge from the hospital.  -- Advised him not to do any strenuous activity until evaluated by Dr. Danielle.    Paroxysmal atrial fibrillation  -- CHADSVASC 3. HASBLED 3. In Afib on exam. He has long standing history of PAfib, but is not on anticoagulation given his history of GI bleed, advanced age, and high risk of falls/major bleeding.     Hypertension, benign  -- Elevated in clinic today.   -- Continue Lisinopril 20 mg daily. Advised him to take the two 10 mg tabs at once rather than one in the AM and one in the PM.   -- Continue Metoprolol 12.5 mg bid.     Hyperlipidemia  -- At goal. LDL from last month was 67.  -- Continue Lipitor 40 mg qhs.     Hypothyroidism  -- Recent TSH was 14 on 10/9/19. He has history of thyroidectomy. May need Levothyroxine dose adjusted.   -- Refer to endocrinology.     Signed:  Amber Emmanuel PA-C  Interventional Cardiology   [id number]  11/21/2019 10:03 AM    I have personally taken the history and examined this patient. I agree with the PA's note as stated above.  All of the patient's questions were answered.

## 2019-11-22 ENCOUNTER — TELEPHONE (OUTPATIENT)
Dept: ENDOCRINOLOGY | Facility: CLINIC | Age: 84
End: 2019-11-22

## 2019-11-22 ENCOUNTER — LAB VISIT (OUTPATIENT)
Dept: LAB | Facility: HOSPITAL | Age: 84
End: 2019-11-22
Attending: INTERNAL MEDICINE
Payer: MEDICARE

## 2019-11-22 DIAGNOSIS — G89.29 CHRONIC LOW BACK PAIN WITH SCIATICA, SCIATICA LATERALITY UNSPECIFIED, UNSPECIFIED BACK PAIN LATERALITY: ICD-10-CM

## 2019-11-22 DIAGNOSIS — M48.02 CERVICAL STENOSIS OF SPINE: ICD-10-CM

## 2019-11-22 DIAGNOSIS — E89.0 POSTOPERATIVE HYPOTHYROIDISM: Primary | ICD-10-CM

## 2019-11-22 DIAGNOSIS — E89.0 POSTOPERATIVE HYPOTHYROIDISM: ICD-10-CM

## 2019-11-22 DIAGNOSIS — M54.40 CHRONIC LOW BACK PAIN WITH SCIATICA, SCIATICA LATERALITY UNSPECIFIED, UNSPECIFIED BACK PAIN LATERALITY: ICD-10-CM

## 2019-11-22 DIAGNOSIS — I71.40 ABDOMINAL AORTIC ANEURYSM WITHOUT RUPTURE: ICD-10-CM

## 2019-11-22 DIAGNOSIS — I10 HYPERTENSION, BENIGN: ICD-10-CM

## 2019-11-22 LAB
ALBUMIN SERPL BCP-MCNC: 3.6 G/DL (ref 3.5–5.2)
ALP SERPL-CCNC: 96 U/L (ref 55–135)
ALT SERPL W/O P-5'-P-CCNC: 12 U/L (ref 10–44)
ANION GAP SERPL CALC-SCNC: 5 MMOL/L (ref 8–16)
AST SERPL-CCNC: 16 U/L (ref 10–40)
BASOPHILS # BLD AUTO: 0.03 K/UL (ref 0–0.2)
BASOPHILS NFR BLD: 0.5 % (ref 0–1.9)
BILIRUB SERPL-MCNC: 0.6 MG/DL (ref 0.1–1)
BUN SERPL-MCNC: 24 MG/DL (ref 8–23)
CALCIUM SERPL-MCNC: 9.8 MG/DL (ref 8.7–10.5)
CHLORIDE SERPL-SCNC: 106 MMOL/L (ref 95–110)
CHOLEST SERPL-MCNC: 91 MG/DL (ref 120–199)
CHOLEST/HDLC SERPL: 2.8 {RATIO} (ref 2–5)
CO2 SERPL-SCNC: 30 MMOL/L (ref 23–29)
CREAT SERPL-MCNC: 0.8 MG/DL (ref 0.5–1.4)
DIFFERENTIAL METHOD: ABNORMAL
EOSINOPHIL # BLD AUTO: 0.3 K/UL (ref 0–0.5)
EOSINOPHIL NFR BLD: 3.8 % (ref 0–8)
ERYTHROCYTE [DISTWIDTH] IN BLOOD BY AUTOMATED COUNT: 14.7 % (ref 11.5–14.5)
EST. GFR  (AFRICAN AMERICAN): >60 ML/MIN/1.73 M^2
EST. GFR  (NON AFRICAN AMERICAN): >60 ML/MIN/1.73 M^2
GLUCOSE SERPL-MCNC: 101 MG/DL (ref 70–110)
HCT VFR BLD AUTO: 36 % (ref 40–54)
HDLC SERPL-MCNC: 33 MG/DL (ref 40–75)
HDLC SERPL: 36.3 % (ref 20–50)
HGB BLD-MCNC: 11.2 G/DL (ref 14–18)
IMM GRANULOCYTES # BLD AUTO: 0.02 K/UL (ref 0–0.04)
IMM GRANULOCYTES NFR BLD AUTO: 0.3 % (ref 0–0.5)
LDLC SERPL CALC-MCNC: 48.6 MG/DL (ref 63–159)
LYMPHOCYTES # BLD AUTO: 1.4 K/UL (ref 1–4.8)
LYMPHOCYTES NFR BLD: 21.5 % (ref 18–48)
MCH RBC QN AUTO: 27.8 PG (ref 27–31)
MCHC RBC AUTO-ENTMCNC: 31.1 G/DL (ref 32–36)
MCV RBC AUTO: 89 FL (ref 82–98)
MONOCYTES # BLD AUTO: 0.6 K/UL (ref 0.3–1)
MONOCYTES NFR BLD: 8.4 % (ref 4–15)
NEUTROPHILS # BLD AUTO: 4.3 K/UL (ref 1.8–7.7)
NEUTROPHILS NFR BLD: 65.5 % (ref 38–73)
NONHDLC SERPL-MCNC: 58 MG/DL
NRBC BLD-RTO: 0 /100 WBC
PLATELET # BLD AUTO: 224 K/UL (ref 150–350)
PMV BLD AUTO: 10.3 FL (ref 9.2–12.9)
POTASSIUM SERPL-SCNC: 4.4 MMOL/L (ref 3.5–5.1)
PROT SERPL-MCNC: 6.8 G/DL (ref 6–8.4)
RBC # BLD AUTO: 4.03 M/UL (ref 4.6–6.2)
SODIUM SERPL-SCNC: 141 MMOL/L (ref 136–145)
T4 FREE SERPL-MCNC: 1.14 NG/DL (ref 0.71–1.51)
TRIGL SERPL-MCNC: 47 MG/DL (ref 30–150)
TSH SERPL DL<=0.005 MIU/L-ACNC: 0.98 UIU/ML (ref 0.4–4)
WBC # BLD AUTO: 6.52 K/UL (ref 3.9–12.7)

## 2019-11-22 PROCEDURE — 84443 ASSAY THYROID STIM HORMONE: CPT | Mod: HCNC

## 2019-11-22 PROCEDURE — 84439 ASSAY OF FREE THYROXINE: CPT | Mod: HCNC

## 2019-11-22 PROCEDURE — 85025 COMPLETE CBC W/AUTO DIFF WBC: CPT | Mod: HCNC

## 2019-11-22 PROCEDURE — 36415 COLL VENOUS BLD VENIPUNCTURE: CPT | Mod: HCNC

## 2019-11-22 PROCEDURE — 80053 COMPREHEN METABOLIC PANEL: CPT | Mod: HCNC

## 2019-11-22 PROCEDURE — 80061 LIPID PANEL: CPT | Mod: HCNC

## 2019-11-22 NOTE — TELEPHONE ENCOUNTER
----- Message from Abby Carrera MD sent at 11/22/2019 10:58 AM CST -----  Please schedule labs in 2 months.

## 2019-11-22 NOTE — TELEPHONE ENCOUNTER
Discussed recent thyroid lab results with pt. TFTs are normal. Will continue current dose of levothyroxine and repeat labs in 2 months. Might require lower dose once he starts to take his calcium supplements at night.

## 2019-11-24 ENCOUNTER — PATIENT MESSAGE (OUTPATIENT)
Dept: INTERNAL MEDICINE | Facility: CLINIC | Age: 84
End: 2019-11-24

## 2019-11-25 ENCOUNTER — PATIENT OUTREACH (OUTPATIENT)
Dept: ADMINISTRATIVE | Facility: HOSPITAL | Age: 84
End: 2019-11-25

## 2019-12-10 ENCOUNTER — OFFICE VISIT (OUTPATIENT)
Dept: VASCULAR SURGERY | Facility: CLINIC | Age: 84
End: 2019-12-10
Payer: MEDICARE

## 2019-12-10 ENCOUNTER — TELEPHONE (OUTPATIENT)
Dept: VASCULAR SURGERY | Facility: CLINIC | Age: 84
End: 2019-12-10

## 2019-12-10 VITALS
SYSTOLIC BLOOD PRESSURE: 141 MMHG | WEIGHT: 138.88 LBS | HEART RATE: 75 BPM | HEIGHT: 65 IN | BODY MASS INDEX: 23.14 KG/M2 | DIASTOLIC BLOOD PRESSURE: 70 MMHG | TEMPERATURE: 98 F

## 2019-12-10 DIAGNOSIS — I71.40 ABDOMINAL AORTIC ANEURYSM (AAA) WITHOUT RUPTURE: ICD-10-CM

## 2019-12-10 DIAGNOSIS — I71.40 ABDOMINAL AORTIC ANEURYSM WITHOUT RUPTURE: Primary | ICD-10-CM

## 2019-12-10 PROCEDURE — 1126F AMNT PAIN NOTED NONE PRSNT: CPT | Mod: HCNC,S$GLB,, | Performed by: SURGERY

## 2019-12-10 PROCEDURE — 99999 PR PBB SHADOW E&M-EST. PATIENT-LVL III: ICD-10-PCS | Mod: PBBFAC,HCNC,, | Performed by: SURGERY

## 2019-12-10 PROCEDURE — 99205 PR OFFICE/OUTPT VISIT, NEW, LEVL V, 60-74 MIN: ICD-10-PCS | Mod: HCNC,S$GLB,, | Performed by: SURGERY

## 2019-12-10 PROCEDURE — 1101F PT FALLS ASSESS-DOCD LE1/YR: CPT | Mod: HCNC,CPTII,S$GLB, | Performed by: SURGERY

## 2019-12-10 PROCEDURE — 1101F PR PT FALLS ASSESS DOC 0-1 FALLS W/OUT INJ PAST YR: ICD-10-PCS | Mod: HCNC,CPTII,S$GLB, | Performed by: SURGERY

## 2019-12-10 PROCEDURE — 99999 PR PBB SHADOW E&M-EST. PATIENT-LVL III: CPT | Mod: PBBFAC,HCNC,, | Performed by: SURGERY

## 2019-12-10 PROCEDURE — 99205 OFFICE O/P NEW HI 60 MIN: CPT | Mod: HCNC,S$GLB,, | Performed by: SURGERY

## 2019-12-10 PROCEDURE — 1126F PR PAIN SEVERITY QUANTIFIED, NO PAIN PRESENT: ICD-10-PCS | Mod: HCNC,S$GLB,, | Performed by: SURGERY

## 2019-12-10 PROCEDURE — 1159F MED LIST DOCD IN RCRD: CPT | Mod: HCNC,S$GLB,, | Performed by: SURGERY

## 2019-12-10 PROCEDURE — 3078F PR MOST RECENT DIASTOLIC BLOOD PRESSURE < 80 MM HG: ICD-10-PCS | Mod: HCNC,CPTII,S$GLB, | Performed by: SURGERY

## 2019-12-10 PROCEDURE — 3078F DIAST BP <80 MM HG: CPT | Mod: HCNC,CPTII,S$GLB, | Performed by: SURGERY

## 2019-12-10 PROCEDURE — 3077F SYST BP >= 140 MM HG: CPT | Mod: HCNC,CPTII,S$GLB, | Performed by: SURGERY

## 2019-12-10 PROCEDURE — 1159F PR MEDICATION LIST DOCUMENTED IN MEDICAL RECORD: ICD-10-PCS | Mod: HCNC,S$GLB,, | Performed by: SURGERY

## 2019-12-10 PROCEDURE — 3077F PR MOST RECENT SYSTOLIC BLOOD PRESSURE >= 140 MM HG: ICD-10-PCS | Mod: HCNC,CPTII,S$GLB, | Performed by: SURGERY

## 2019-12-10 NOTE — LETTER
[facility]  [address]  [place] [address]  Phone: [phone]  Fax: [phone] December 10, 2019      Amber Emmanuel PA-C  [address]  [facility] [address]    Patient: Nando Salguero   MR Number: [id number]   YOB: 1935   Date of Visit: 12/10/2019       Dear Amber Emamnuel:    Thank you for referring Nando Salguero to me for evaluation. Attached you will find relevant portions of my assessment and plan of care.    If you have questions, please do not hesitate to call me. I look forward to following Nando Salguero along with you.    Sincerely,        MD PALOMA Lam III/etchio Smithosure

## 2019-12-10 NOTE — PROGRESS NOTES
REFERRING PHYSICIAN:  Chirag Elise M.D.     HISTORY OF PRESENT ILLNESS:  An 84-year-old male from [place], sent for evaluation   of abdominal aortic aneurysm.  He had a noncontrasted CT scan in October 2019   showing a 4.0 cm fusiform AAA which was highly calcified.  However,   approximately six weeks later, he had an ultrasound, which suggested aneurysm   diameter of 4.5.    He has chronic back pain ever since a back fusion, greater than 30 years ago.    He has no changes in his chronic back pain.    PAST MEDICAL HISTORY:  1.  Known coronary artery disease, status post CABG greater than 25 years ago   with subsequent PCI, the last approximately 5 to 7 years ago.  He denies any   angina currently.  2.  History of congestive heart failure.  3.  Chronic back pain.  4.  Hyperlipidemia.  5.  Hypertension.  6.  Paroxysmal atrial fibrillation, not on anticoagulation.    PAST SURGICAL HISTORY:  1.  Recent laparoscopic cholecystectomy/ERCP approximately a month ago during   placement.  2.  Prostate surgery.  Otherwise see [facility].    FAMILY HISTORY:  Positive for coronary artery disease.    SOCIAL HISTORY:  He is a nonsmoker.    MEDICATIONS:  Include aspirin and statin.  See EPIC for full list.    ALLERGIES:  SULFA.    REVIEW OF SYSTEMS:  Denies postprandial pain or DVT.  All other systems including eyes, ENT, respiratory, musculoskeletal,   psychiatric, lymph, allergy and immune are negative.    PHYSICAL EXAMINATION:  VITAL SIGNS:  See nursing notes.  GENERAL:  He is a rather slender gentleman, 5 feet 5 inches and weighs 138   pounds.  He is in no acute distress.  RESPIRATORY:  Normal effort.  Clear to auscultation.  CARDIAC:  Regular rate and rhythm, nondisplaced PMI, no murmur.  VASCULAR:  2+ radial and brachial pulses.  ABDOMEN:  No masses or tenderness.  No hepatomegaly.  Aortic aneurysm is   palpable and nontender.  EXTREMITIES:  No edema, varicosities, ulceration, gangrene or digital petechiae.  EYES:  Normal conjunctivae  and lids.  ENT:  Good dentition.  NECK:  No JVD or thyromegaly.  MUSCULOSKELETAL:  No kyphosis or scoliosis.  Extremities are without clubbing or   cyanosis.  SKIN:  Warm and dry.  NEUROLOGIC:  Alert and oriented x3.  Normal mood and affect.  Midline tongue.    No speech difficulty or hoarseness.  5/5 motor strength in all extremities.    IMAGING:  Noncontrast CT scan was personally reviewed and demonstrates highly   calcified juxtarenal neck, 4.0 cm fusiform diameter.    ASSESSMENT:  Abdominal aortic aneurysm, 4.0 cm by noncontrast CT two months ago   with suggestion of a 5 mm interval growth in six weeks by duplex.    My suspicion is that the ultrasound is wrong, but will not know this without   repeating the goal standard CT scan.    PLAN:  Abdominal and pelvis CTA and followup thereafter.      FLORI  dd: 12/10/2019 13:59:30 (CST)  td: 12/11/2019 00:15:50 (CST)  Doc ID   #[id number]  Job ID #[id number]    CC:

## 2019-12-13 ENCOUNTER — TELEPHONE (OUTPATIENT)
Dept: VASCULAR SURGERY | Facility: CLINIC | Age: 84
End: 2019-12-13

## 2019-12-13 NOTE — TELEPHONE ENCOUNTER
Received call from patient's wife stating that patient would like to reschedule appt for FU with Dr. Danielle and for CTA to January 2020. Appts reschduled per request. New appt letter placed in mail.

## 2019-12-18 RX ORDER — LEVOTHYROXINE SODIUM 125 UG/1
TABLET ORAL
Qty: 90 TABLET | Refills: 4 | Status: SHIPPED | OUTPATIENT
Start: 2019-12-18 | End: 2021-03-09

## 2019-12-19 RX ORDER — AMLODIPINE BESYLATE 10 MG/1
TABLET ORAL
Qty: 90 TABLET | Refills: 11 | Status: SHIPPED | OUTPATIENT
Start: 2019-12-19 | End: 2021-03-28

## 2020-01-17 ENCOUNTER — TELEPHONE (OUTPATIENT)
Dept: VASCULAR SURGERY | Facility: CLINIC | Age: 85
End: 2020-01-17

## 2020-01-17 ENCOUNTER — HOSPITAL ENCOUNTER (OUTPATIENT)
Dept: RADIOLOGY | Facility: HOSPITAL | Age: 85
Discharge: HOME | End: 2020-01-17
Attending: SURGERY
Payer: MEDICARE

## 2020-01-17 ENCOUNTER — OFFICE VISIT (OUTPATIENT)
Dept: VASCULAR SURGERY | Facility: CLINIC | Age: 85
End: 2020-01-17
Payer: MEDICARE

## 2020-01-17 VITALS
TEMPERATURE: 98 F | HEIGHT: 65 IN | SYSTOLIC BLOOD PRESSURE: 129 MMHG | HEART RATE: 87 BPM | DIASTOLIC BLOOD PRESSURE: 62 MMHG | BODY MASS INDEX: 24.24 KG/M2 | WEIGHT: 145.5 LBS

## 2020-01-17 DIAGNOSIS — I71.40 ABDOMINAL AORTIC ANEURYSM (AAA) WITHOUT RUPTURE: ICD-10-CM

## 2020-01-17 DIAGNOSIS — I71.40 ABDOMINAL AORTIC ANEURYSM WITHOUT RUPTURE: Primary | ICD-10-CM

## 2020-01-17 LAB
CREAT SERPL-MCNC: 0.7 MG/DL (ref 0.5–1.4)
SAMPLE: NORMAL

## 2020-01-17 PROCEDURE — 74174 CTA ABD&PLVS W/CONTRAST: CPT | Mod: 26,HCNC,, | Performed by: RADIOLOGY

## 2020-01-17 PROCEDURE — 74174 CTA ABDOMEN AND PELVIS: ICD-10-PCS | Mod: 26,HCNC,, | Performed by: RADIOLOGY

## 2020-01-17 PROCEDURE — 99499 UNLISTED E&M SERVICE: CPT | Mod: S$GLB,,, | Performed by: SURGERY

## 2020-01-17 PROCEDURE — 1101F PR PT FALLS ASSESS DOC 0-1 FALLS W/OUT INJ PAST YR: ICD-10-PCS | Mod: HCNC,CPTII,S$GLB, | Performed by: SURGERY

## 2020-01-17 PROCEDURE — 1125F AMNT PAIN NOTED PAIN PRSNT: CPT | Mod: HCNC,S$GLB,, | Performed by: SURGERY

## 2020-01-17 PROCEDURE — 3074F PR MOST RECENT SYSTOLIC BLOOD PRESSURE < 130 MM HG: ICD-10-PCS | Mod: HCNC,CPTII,S$GLB, | Performed by: SURGERY

## 2020-01-17 PROCEDURE — 99214 PR OFFICE/OUTPT VISIT, EST, LEVL IV, 30-39 MIN: ICD-10-PCS | Mod: HCNC,S$GLB,, | Performed by: SURGERY

## 2020-01-17 PROCEDURE — 74174 CTA ABD&PLVS W/CONTRAST: CPT | Mod: TC,HCNC

## 2020-01-17 PROCEDURE — 3078F DIAST BP <80 MM HG: CPT | Mod: HCNC,CPTII,S$GLB, | Performed by: SURGERY

## 2020-01-17 PROCEDURE — 1159F PR MEDICATION LIST DOCUMENTED IN MEDICAL RECORD: ICD-10-PCS | Mod: HCNC,S$GLB,, | Performed by: SURGERY

## 2020-01-17 PROCEDURE — 99499 RISK ADDL DX/OHS AUDIT: ICD-10-PCS | Mod: S$GLB,,, | Performed by: SURGERY

## 2020-01-17 PROCEDURE — 3078F PR MOST RECENT DIASTOLIC BLOOD PRESSURE < 80 MM HG: ICD-10-PCS | Mod: HCNC,CPTII,S$GLB, | Performed by: SURGERY

## 2020-01-17 PROCEDURE — 1125F PR PAIN SEVERITY QUANTIFIED, PAIN PRESENT: ICD-10-PCS | Mod: HCNC,S$GLB,, | Performed by: SURGERY

## 2020-01-17 PROCEDURE — 1159F MED LIST DOCD IN RCRD: CPT | Mod: HCNC,S$GLB,, | Performed by: SURGERY

## 2020-01-17 PROCEDURE — 99999 PR PBB SHADOW E&M-EST. PATIENT-LVL III: ICD-10-PCS | Mod: PBBFAC,HCNC,, | Performed by: SURGERY

## 2020-01-17 PROCEDURE — 99999 PR PBB SHADOW E&M-EST. PATIENT-LVL III: CPT | Mod: PBBFAC,HCNC,, | Performed by: SURGERY

## 2020-01-17 PROCEDURE — 99214 OFFICE O/P EST MOD 30 MIN: CPT | Mod: HCNC,S$GLB,, | Performed by: SURGERY

## 2020-01-17 PROCEDURE — 3074F SYST BP LT 130 MM HG: CPT | Mod: HCNC,CPTII,S$GLB, | Performed by: SURGERY

## 2020-01-17 PROCEDURE — 1101F PT FALLS ASSESS-DOCD LE1/YR: CPT | Mod: HCNC,CPTII,S$GLB, | Performed by: SURGERY

## 2020-01-17 PROCEDURE — 25500020 PHARM REV CODE 255: Mod: HCNC | Performed by: SURGERY

## 2020-01-17 RX ADMIN — IOHEXOL 100 ML: 350 INJECTION, SOLUTION INTRAVENOUS at 09:01

## 2020-01-17 NOTE — PROGRESS NOTES
REFERRING PHYSICIAN:  Chirag Elise M.D.     HISTORY OF PRESENT ILLNESS:  An 84-year-old male from [place], sent for evaluation   of abdominal aortic aneurysm.  He had a noncontrasted CT scan in October 2019   showing a 4.0 cm fusiform AAA which was highly calcified.  However,   approximately six weeks later, he had an ultrasound, which suggested aneurysm   diameter of 4.5.    He has chronic back pain ever since a back fusion, greater than 30 years ago.    He has no changes in his chronic back pain.    He returns for follow up. He was recently evaluated in the ER for difficulty with ambulating and right foot and ankle swelling. He reports no abdominal pain, claudication, rest pain, ulceration.    PAST MEDICAL HISTORY:  1.  Known coronary artery disease, status post CABG greater than 25 years ago   with subsequent PCI, the last approximately 5 to 7 years ago.  He denies any   angina currently.  2.  History of congestive heart failure.  3.  Chronic back pain.  4.  Hyperlipidemia.  5.  Hypertension.  6.  Paroxysmal atrial fibrillation, not on anticoagulation.    PAST SURGICAL HISTORY:  1.  Recent laparoscopic cholecystectomy/ERCP approximately a month ago during   placement.  2.  Prostate surgery.  Otherwise see [facility].    FAMILY HISTORY:  Positive for coronary artery disease.    SOCIAL HISTORY:  He is a nonsmoker.    MEDICATIONS:  Include aspirin and statin.  See EPIC for full list.    ALLERGIES:  SULFA.    REVIEW OF SYSTEMS:  Denies postprandial pain or DVT.  All other systems including eyes, ENT, respiratory, musculoskeletal,   psychiatric, lymph, allergy and immune are negative.    PHYSICAL EXAMINATION:  VITAL SIGNS:  See nursing notes.  GENERAL:  He is a rather slender gentleman, 5 feet 5 inches and weighs 138   pounds.  He is in no acute distress.  RESPIRATORY:  Normal effort.  Clear to auscultation.  CARDIAC:  Regular rate and rhythm, nondisplaced PMI, no murmur.  VASCULAR:  Strong biphasic DP and PT signals  ABDOMEN:   No masses or tenderness.  No hepatomegaly.  Aortic aneurysm is   palpable and nontender.  EXTREMITIES:  No edema, varicosities, ulceration, gangrene or digital petechiae.  EYES:  Normal conjunctivae and lids.  ENT:  Good dentition.  NECK:  No JVD or thyromegaly.  MUSCULOSKELETAL:  No kyphosis or scoliosis.  Extremities are without clubbing or   cyanosis.  SKIN:  Warm and dry.  NEUROLOGIC:  Alert and oriented x3.  Normal mood and affect.  Midline tongue.    No speech difficulty or hoarseness.  5/5 motor strength in all extremities.    IMAGING:  CTA scan was personally reviewed and demonstrates highly calcified juxtarenal neck, again 4.0 cm fusiform diameter.    ASSESSMENT:  Infrarenal abdominal aortic aneurysm, 4 cm on most recent CTA which is concordant with previous noncontrast CT scan.    I do not think his aneurysm is growing rapidly.    Follow-up in [place] - his home in 1 year    Carlos Morrissey MD PGY6  Vascular and Endovascular Surgery Fellow  01/17/2020     VASCULAR STAFF    I have personally taken the history and examined this patient and agree with the resident's note as stated above    MIKE Danielle III, MD, FACS  Professor and Chief, Vascular and Endovascular Surgery

## 2020-01-17 NOTE — TELEPHONE ENCOUNTER
Contacted patient in response. States he would like to have his AVS from visit with Dr. Danielle mailed to him. Will print AVS and will place in mail to patient. ----- Message from Kim Watts sent at 1/17/2020  1:49 PM CST -----  Contact: pt called [phone]  Pt would like to know if he can get a summary of today's visit mailed out to them if possible.

## 2020-03-23 RX ORDER — HYDROCODONE BITARTRATE AND ACETAMINOPHEN 7.5; 325 MG/1; MG/1
1 TABLET ORAL EVERY 8 HOURS PRN
Qty: 12 TABLET | Refills: 0 | Status: SHIPPED | OUTPATIENT
Start: 2020-03-23 | End: 2020-03-28 | Stop reason: SDUPTHER

## 2020-03-23 NOTE — TELEPHONE ENCOUNTER
----- Message from Jsoselyn Whatley sent at 3/23/2020  3:18 PM CDT -----  Contact: Milly/Daughter [phone]  Prescription Request:     Name of medication: oxyCODONE-acetaminophen (PERCOCET) 5-325 mg per tablet    Reason for request: Refill    Pharmacy: [facility] #[id number] - [place], [place] - [address] AT [facility]    Please advise.    Thank You

## 2020-03-28 NOTE — TELEPHONE ENCOUNTER
----- Message from Ayana Brooks sent at 3/28/2020 10:43 AM CDT -----  Contact: Pt [phone]  Is this a refill or new RX:  Refill    RX name and strength: HYDROcodone-acetaminophen (NORCO) 7.5-325 mg per tablet    Pharmacy name and phone # [id number] - [place] - [address] AT [facility] [phone] (Phone) [phone] (Fax)

## 2020-03-29 RX ORDER — HYDROCODONE BITARTRATE AND ACETAMINOPHEN 7.5; 325 MG/1; MG/1
1 TABLET ORAL EVERY 8 HOURS PRN
Qty: 12 TABLET | Refills: 0 | Status: SHIPPED | OUTPATIENT
Start: 2020-03-29 | End: 2020-05-06

## 2020-05-06 ENCOUNTER — OFFICE VISIT (OUTPATIENT)
Dept: INTERNAL MEDICINE | Facility: CLINIC | Age: 85
End: 2020-05-06
Payer: MEDICARE

## 2020-05-06 DIAGNOSIS — M46.92 INFLAMMATORY SPONDYLOPATHY OF CERVICAL REGION: ICD-10-CM

## 2020-05-06 DIAGNOSIS — G95.89 MYELOMALACIA OF CERVICAL CORD: ICD-10-CM

## 2020-05-06 DIAGNOSIS — I20.9 CARDIAC ANGINA: ICD-10-CM

## 2020-05-06 DIAGNOSIS — I48.0 PAROXYSMAL ATRIAL FIBRILLATION: ICD-10-CM

## 2020-05-06 DIAGNOSIS — M48.02 CERVICAL STENOSIS OF SPINE: Primary | ICD-10-CM

## 2020-05-06 PROCEDURE — 99442 PR PHYSICIAN TELEPHONE EVALUATION 11-20 MIN: CPT | Mod: 95,,, | Performed by: INTERNAL MEDICINE

## 2020-05-06 PROCEDURE — 99442 PR PHYSICIAN TELEPHONE EVALUATION 11-20 MIN: ICD-10-PCS | Mod: 95,,, | Performed by: INTERNAL MEDICINE

## 2020-05-06 RX ORDER — OXYCODONE AND ACETAMINOPHEN 5; 325 MG/1; MG/1
1 TABLET ORAL EVERY 12 HOURS PRN
Qty: 60 TABLET | Refills: 0 | Status: SHIPPED | OUTPATIENT
Start: 2020-05-06 | End: 2020-07-01 | Stop reason: SDUPTHER

## 2020-05-06 NOTE — PROGRESS NOTES
Subjective:       Patient ID: Nando Salguero is a 85 y.o. male.    Chief Complaint: Follow-up (Neck pain)    The patient location is: Home  The chief complaint leading to consultation is: Neck pain follow up  Visit type: audio only  Total time spent with patient: 15 minutes  Each patient to whom he or she provides medical services by telemedicine is:  (1) informed of the relationship between the physician and patient and the respective role of any other health care provider with respect to management of the patient; and (2) notified that he or she may decline to receive medical services by telemedicine and may withdraw from such care at any time.    Notes:  Patient well known to me seen via audio only visit to review neck pain.  He wanted a refill of neck pain medication.  Overall he says he has continued to have neck pain with stiffness and sometimes radiation into the arms.  He has tried hydrocodone in the past but it does not work as well as Percocet.  He has not had a prescription in quite some time and we would like to refill the medication for him.  He understands that it can cause drowsiness, constipation, dizziness any even be habit-forming.  He expressed understanding and has taken it for a while and says he monitors his use of the medicine and feels that it generally works well and he tolerates it well.  He denies any other active or acute complaints.  He has been staying home during the COVID pandemic.  No exposure to anyone ill.      Review of Systems   Constitutional: Negative for fatigue and fever.   Respiratory: Negative for cough and shortness of breath.    Cardiovascular: Negative for chest pain and leg swelling.   Musculoskeletal: Positive for arthralgias, neck pain and neck stiffness.       Objective:      Physical Exam   Constitutional: He is oriented to person, place, and time.   Patient sounds well via the audio visit.  No acute distress except he complains that his neck is stiff and  painful   Pulmonary/Chest: No respiratory distress.   Musculoskeletal: He exhibits tenderness (Neck tenderness when palpated by the patient and with patient range of motion).   Neurological: He is alert and oriented to person, place, and time.       Assessment:       1. Cervical stenosis of spine    2. Inflammatory spondylopathy of cervical region    3. Myelomalacia of cervical cord    4. Cardiac angina    5. Paroxysmal atrial fibrillation        Plan:       Nando was seen today for follow-up.    Diagnoses and all orders for this visit:    Cervical stenosis of spine    Inflammatory spondylopathy of cervical region    Myelomalacia of cervical cord    Cardiac angina    Paroxysmal atrial fibrillation    Other orders  -     oxyCODONE-acetaminophen (PERCOCET) 5-325 mg per tablet; Take 1 tablet by mouth every 12 (twelve) hours as needed for Pain.        hope to have an in-person follow-up in a few months

## 2020-07-01 RX ORDER — OXYCODONE AND ACETAMINOPHEN 5; 325 MG/1; MG/1
1 TABLET ORAL EVERY 12 HOURS PRN
Qty: 60 TABLET | Refills: 0 | Status: SHIPPED | OUTPATIENT
Start: 2020-07-01 | End: 2020-09-22 | Stop reason: SDUPTHER

## 2020-07-01 NOTE — TELEPHONE ENCOUNTER
----- Message from Polina Laureano sent at 7/1/2020 10:17 AM CDT -----  Regarding: speak w/ Nurse  Pt requesting to speak w/ the Nurse    Please call and advise      Phone  [phone]

## 2020-07-15 ENCOUNTER — PES CALL (OUTPATIENT)
Dept: ADMINISTRATIVE | Facility: CLINIC | Age: 85
End: 2020-07-15

## 2020-08-05 ENCOUNTER — PES CALL (OUTPATIENT)
Dept: ADMINISTRATIVE | Facility: CLINIC | Age: 85
End: 2020-08-05

## 2020-08-14 ENCOUNTER — OFFICE VISIT (OUTPATIENT)
Dept: INTERNAL MEDICINE | Facility: CLINIC | Age: 85
End: 2020-08-14
Payer: MEDICARE

## 2020-08-14 VITALS
SYSTOLIC BLOOD PRESSURE: 124 MMHG | BODY MASS INDEX: 23.31 KG/M2 | HEART RATE: 72 BPM | WEIGHT: 139.88 LBS | HEIGHT: 65 IN | DIASTOLIC BLOOD PRESSURE: 72 MMHG

## 2020-08-14 DIAGNOSIS — I48.0 PAROXYSMAL ATRIAL FIBRILLATION: ICD-10-CM

## 2020-08-14 DIAGNOSIS — Z99.89 DEPENDENCE ON OTHER ENABLING MACHINES AND DEVICES: ICD-10-CM

## 2020-08-14 DIAGNOSIS — I20.9 CARDIAC ANGINA: ICD-10-CM

## 2020-08-14 DIAGNOSIS — I10 HYPERTENSION, BENIGN: ICD-10-CM

## 2020-08-14 DIAGNOSIS — M48.02 CERVICAL STENOSIS OF SPINE: ICD-10-CM

## 2020-08-14 DIAGNOSIS — F41.9 ANXIETY: ICD-10-CM

## 2020-08-14 DIAGNOSIS — Z00.00 ENCOUNTER FOR PREVENTIVE HEALTH EXAMINATION: Primary | ICD-10-CM

## 2020-08-14 DIAGNOSIS — R00.1 BRADYCARDIA: ICD-10-CM

## 2020-08-14 DIAGNOSIS — E04.1 THYROID NODULE: ICD-10-CM

## 2020-08-14 DIAGNOSIS — M46.92 INFLAMMATORY SPONDYLOPATHY OF CERVICAL REGION: ICD-10-CM

## 2020-08-14 DIAGNOSIS — I71.40 ABDOMINAL AORTIC ANEURYSM WITHOUT RUPTURE: ICD-10-CM

## 2020-08-14 DIAGNOSIS — F11.90 CHRONIC, CONTINUOUS USE OF OPIOIDS: ICD-10-CM

## 2020-08-14 DIAGNOSIS — I70.0 AORTIC ATHEROSCLEROSIS: ICD-10-CM

## 2020-08-14 DIAGNOSIS — E89.0 POSTOPERATIVE HYPOTHYROIDISM: ICD-10-CM

## 2020-08-14 DIAGNOSIS — E78.2 MIXED HYPERLIPIDEMIA: ICD-10-CM

## 2020-08-14 DIAGNOSIS — I25.810 CORONARY ARTERY DISEASE INVOLVING CORONARY BYPASS GRAFT OF NATIVE HEART WITHOUT ANGINA PECTORIS: ICD-10-CM

## 2020-08-14 DIAGNOSIS — Z95.1 S/P CABG (CORONARY ARTERY BYPASS GRAFT): ICD-10-CM

## 2020-08-14 DIAGNOSIS — G95.89 MYELOMALACIA OF CERVICAL CORD: ICD-10-CM

## 2020-08-14 DIAGNOSIS — M47.812 FACET ARTHROPATHY, CERVICAL: ICD-10-CM

## 2020-08-14 PROBLEM — A41.9 SEPSIS: Status: RESOLVED | Noted: 2019-10-05 | Resolved: 2020-08-14

## 2020-08-14 PROCEDURE — 3074F PR MOST RECENT SYSTOLIC BLOOD PRESSURE < 130 MM HG: ICD-10-PCS | Mod: CPTII,S$GLB,, | Performed by: NURSE PRACTITIONER

## 2020-08-14 PROCEDURE — 3078F PR MOST RECENT DIASTOLIC BLOOD PRESSURE < 80 MM HG: ICD-10-PCS | Mod: CPTII,S$GLB,, | Performed by: NURSE PRACTITIONER

## 2020-08-14 PROCEDURE — G0439 PPPS, SUBSEQ VISIT: HCPCS | Mod: S$GLB,,, | Performed by: NURSE PRACTITIONER

## 2020-08-14 PROCEDURE — 3074F SYST BP LT 130 MM HG: CPT | Mod: CPTII,S$GLB,, | Performed by: NURSE PRACTITIONER

## 2020-08-14 PROCEDURE — G0439 PR MEDICARE ANNUAL WELLNESS SUBSEQUENT VISIT: ICD-10-PCS | Mod: S$GLB,,, | Performed by: NURSE PRACTITIONER

## 2020-08-14 PROCEDURE — 3078F DIAST BP <80 MM HG: CPT | Mod: CPTII,S$GLB,, | Performed by: NURSE PRACTITIONER

## 2020-08-14 PROCEDURE — 99999 PR PBB SHADOW E&M-EST. PATIENT-LVL IV: ICD-10-PCS | Mod: PBBFAC,,, | Performed by: NURSE PRACTITIONER

## 2020-08-14 PROCEDURE — 99999 PR PBB SHADOW E&M-EST. PATIENT-LVL IV: CPT | Mod: PBBFAC,,, | Performed by: NURSE PRACTITIONER

## 2020-08-14 NOTE — PROGRESS NOTES
"  Nando Salguero presented for a  Medicare AWV and comprehensive Health Risk Assessment today. The following components were reviewed and updated:    · Medical history  · Family History  · Social history  · Allergies and Current Medications  · Health Risk Assessment  · Health Maintenance  · Care Team     ** See Completed Assessments for Annual Wellness Visit within the encounter summary.**         The following assessments were completed:  · Living Situation  · CAGE  · Depression Screening  · Timed Get Up and Go  · Whisper Test  · Cognitive Function Screening  · Nutrition Screening  · ADL Screening  · PAQ Screening        Vitals:    08/14/20 0841   BP: 124/72   BP Location: Left arm   Patient Position: Sitting   Pulse: 72   Weight: 63.5 kg (139 lb 14.1 oz)   Height: 5' 5" (1.651 m)     Body mass index is 23.28 kg/m².     Physical Exam  Constitutional:       General: He is not in acute distress.     Appearance: He is well-developed. He is not ill-appearing or diaphoretic.      Comments: Ambulating with cane   HENT:      Head: Normocephalic and atraumatic.   Eyes:      General: No scleral icterus.     Conjunctiva/sclera: Conjunctivae normal.   Neck:      Musculoskeletal: Normal range of motion and neck supple.   Cardiovascular:      Rate and Rhythm: Normal rate. Rhythm irregular.      Pulses: Normal pulses.      Heart sounds: Normal heart sounds.   Pulmonary:      Effort: Pulmonary effort is normal. No respiratory distress.      Breath sounds: Normal breath sounds.   Abdominal:      General: There is no distension.   Musculoskeletal: Normal range of motion.   Skin:     General: Skin is warm and dry.   Neurological:      Mental Status: He is alert and oriented to person, place, and time.   Psychiatric:         Behavior: Behavior normal.           Diagnoses and health risks identified today and associated recommendations/orders:    1. Encounter for preventive health examination  Assessments completed  Preventive health " recommendations reviewed    2. Abdominal aortic aneurysm without rupture  Stable.   Followed by vascular.     3. Aortic atherosclerosis  Stable.  Seen on chest x-ray from 03/09/2015  Controlled with current medical therapy  Followed by PCP.     4. Cardiac angina  Stable.   Controlled with current medical therapy  Followed by cardiology      5. Coronary artery disease involving coronary bypass graft of native heart without angina pectoris  Stable.  History of CABG and stent placement  Controlled with current medical therapy  Followed by cardiology.     6. S/P CABG (coronary artery bypass graft)  Stable.   Followed by cardiology    7. Paroxysmal atrial fibrillation  Stable.   Controlled with current medical therapy  Followed by cardiology.     8. Facet arthropathy, cervical  Stable.   Controlled with current medical therapy  Followed by PCP.     9. Chronic, continuous use of opioids  Stable.   Controlled with current medical therapy  Followed by PCP.     10. Cervical stenosis of spine  Stable.   Controlled with current medical therapy  Followed by PCP.     11. Inflammatory spondylopathy of cervical region  Stable.   Controlled with current medical therapy  Followed by PCP.     12. Myelomalacia of cervical cord  Stable.   Controlled with current medical therapy  Followed by PCP.     13. Hypertension, benign  Stable.   Controlled with current medical therapy  Followed by PCP.     14. Mixed hyperlipidemia  Stable.   Controlled with current medical therapy  Followed by PCP.     15. Thyroid nodule  Stable.   History of thyroidectomy  Followed by PCP.     16. Postoperative hypothyroidism  Stable.   Controlled with current medical therapy  Followed by PCP.     17. Anxiety  Stable.   Controlled with current medical therapy  Followed by PCP.     18. Bradycardia  Stable.   Followed by cardiology.     19. 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   Ambulating with a cane.  Safety maintained  Followed by PCP.      Provided Nando with a 5-10 year written screening schedule and personal prevention plan. Recommendations were developed using the USPSTF age appropriate recommendations. Education, counseling, and referrals were provided as needed. After Visit Summary printed and given to patient which includes a list of additional screenings\tests needed.    Follow up in about 3 months (around 11/14/2020) for a routine visit with your primary care provider or sooner if problems arise.    Janina Holbrook, NP

## 2020-08-14 NOTE — PATIENT INSTRUCTIONS
Counseling and Referral of Other Preventative  (Italic type indicates deductible and co-insurance are waived)    Patient Name: Nando Salguero  Today's Date: 8/14/2020    Health Maintenance       Date Due Completion Date    TETANUS VACCINE 03/17/1953 ---    Shingles Vaccine (1 of 2) 03/17/1985 ---    Influenza Vaccine (1) 09/01/2020 9/11/2019    Lipid Panel 11/22/2024 11/22/2019        No orders of the defined types were placed in this encounter.    The following information is provided to all patients.  This information is to help you find resources for any of the problems found today that may be affecting your health:                Living healthy guide: www.[facility].[place].[facility]      Understanding Diabetes: www.diabetes.org      Eating healthy: www.cdc.gov/healthyweight      CDC home safety checklist: www.cdc.gov/steadi/patient.html      Agency on Aging: www.goea.[place].[facility]      Alcoholics anonymous (AA): www.aa.org      Physical Activity: www.calderon.nih.gov/kp9iuta      Tobacco use: www.quitwithusla.org

## 2020-09-22 RX ORDER — OXYCODONE AND ACETAMINOPHEN 5; 325 MG/1; MG/1
1 TABLET ORAL EVERY 12 HOURS PRN
Qty: 60 TABLET | Refills: 0 | Status: SHIPPED | OUTPATIENT
Start: 2020-09-22 | End: 2020-11-30 | Stop reason: SDUPTHER

## 2020-09-22 NOTE — TELEPHONE ENCOUNTER
----- Message from Jennifer Roa sent at 9/22/2020  4:02 PM CDT -----  Regarding: refill  Contact: Self  Type:  Needs Medical Advice    Who Called: Mom     Would the patient rather a call back or a response via [facility]? Call back     Best Call Back Number: [phone]    Additional Information: Mom [phone]-----calling to get a refill on the pt oxyCODONE-acetaminophen (PERCOCET) 5-325 mg per tablet. The pt is requesting a call back

## 2020-11-23 ENCOUNTER — DOCUMENTATION ONLY (OUTPATIENT)
Dept: CARDIOLOGY | Facility: CLINIC | Age: 85
End: 2020-11-23

## 2020-11-23 NOTE — PROGRESS NOTES
Several attempts to schedule pt's f/u visit from recall, states he will call back or I call him back.

## 2020-11-30 ENCOUNTER — TELEPHONE (OUTPATIENT)
Dept: INTERNAL MEDICINE | Facility: CLINIC | Age: 85
End: 2020-11-30

## 2020-11-30 RX ORDER — OXYCODONE AND ACETAMINOPHEN 5; 325 MG/1; MG/1
1 TABLET ORAL EVERY 12 HOURS PRN
Qty: 60 TABLET | Refills: 0 | Status: SHIPPED | OUTPATIENT
Start: 2020-11-30 | End: 2020-12-30 | Stop reason: SDUPTHER

## 2020-11-30 NOTE — TELEPHONE ENCOUNTER
----- Message from Kaylen Dailey sent at 11/30/2020  9:54 AM CST -----  Contact: Nando perea [phone]  Type:  RX Refill Request    Who Called: Nando brit  Refill or New Rx: refill  RX Name and Strength: oxyCODONE-acetaminophen (PERCOCET) 5-325 mg per tablet  How is the patient currently taking it? (ex. 1XDay):  Is this a 30 day or 90 day RX:  Preferred Pharmacy with phone number: [facility]/pharmacy #[id number] - [place] - [address] AT [facility] [phone] (Phone)   Local or Mail Order: local  Ordering Provider: Dr Rogelio Oliver  Would the patient rather a call back or a response via [facility]? Call back  Best Call Back Number: [phone]  Additional Information:

## 2020-12-30 ENCOUNTER — PATIENT MESSAGE (OUTPATIENT)
Dept: INTERNAL MEDICINE | Facility: CLINIC | Age: 85
End: 2020-12-30

## 2020-12-30 ENCOUNTER — OFFICE VISIT (OUTPATIENT)
Dept: INTERNAL MEDICINE | Facility: CLINIC | Age: 85
End: 2020-12-30
Payer: MEDICARE

## 2020-12-30 ENCOUNTER — LAB VISIT (OUTPATIENT)
Dept: LAB | Facility: HOSPITAL | Age: 85
End: 2020-12-30
Attending: INTERNAL MEDICINE
Payer: MEDICARE

## 2020-12-30 VITALS
HEART RATE: 74 BPM | DIASTOLIC BLOOD PRESSURE: 62 MMHG | SYSTOLIC BLOOD PRESSURE: 112 MMHG | WEIGHT: 146.63 LBS | HEIGHT: 65 IN | OXYGEN SATURATION: 99 % | BODY MASS INDEX: 24.43 KG/M2

## 2020-12-30 DIAGNOSIS — I25.810 CORONARY ARTERY DISEASE INVOLVING CORONARY BYPASS GRAFT OF NATIVE HEART WITHOUT ANGINA PECTORIS: ICD-10-CM

## 2020-12-30 DIAGNOSIS — R26.81 GAIT INSTABILITY: ICD-10-CM

## 2020-12-30 DIAGNOSIS — E04.1 THYROID NODULE: ICD-10-CM

## 2020-12-30 DIAGNOSIS — E78.2 MIXED HYPERLIPIDEMIA: ICD-10-CM

## 2020-12-30 DIAGNOSIS — K22.2 ESOPHAGEAL STRICTURE: ICD-10-CM

## 2020-12-30 DIAGNOSIS — Z00.00 ROUTINE PHYSICAL EXAMINATION: ICD-10-CM

## 2020-12-30 DIAGNOSIS — I10 HYPERTENSION, BENIGN: ICD-10-CM

## 2020-12-30 DIAGNOSIS — M48.02 CERVICAL STENOSIS OF SPINE: ICD-10-CM

## 2020-12-30 DIAGNOSIS — M48.02 CERVICAL STENOSIS OF SPINE: Primary | ICD-10-CM

## 2020-12-30 LAB
ALBUMIN SERPL BCP-MCNC: 3.7 G/DL (ref 3.5–5.2)
ALP SERPL-CCNC: 107 U/L (ref 55–135)
ALT SERPL W/O P-5'-P-CCNC: 14 U/L (ref 10–44)
ANION GAP SERPL CALC-SCNC: 8 MMOL/L (ref 8–16)
AST SERPL-CCNC: 15 U/L (ref 10–40)
BASOPHILS # BLD AUTO: 0.06 K/UL (ref 0–0.2)
BASOPHILS NFR BLD: 0.6 % (ref 0–1.9)
BILIRUB SERPL-MCNC: 0.5 MG/DL (ref 0.1–1)
BUN SERPL-MCNC: 38 MG/DL (ref 8–23)
CALCIUM SERPL-MCNC: 9.1 MG/DL (ref 8.7–10.5)
CHLORIDE SERPL-SCNC: 109 MMOL/L (ref 95–110)
CHOLEST SERPL-MCNC: 79 MG/DL (ref 120–199)
CHOLEST/HDLC SERPL: 2.3 {RATIO} (ref 2–5)
CO2 SERPL-SCNC: 25 MMOL/L (ref 23–29)
CREAT SERPL-MCNC: 1 MG/DL (ref 0.5–1.4)
DIFFERENTIAL METHOD: ABNORMAL
EOSINOPHIL # BLD AUTO: 0.4 K/UL (ref 0–0.5)
EOSINOPHIL NFR BLD: 3.5 % (ref 0–8)
ERYTHROCYTE [DISTWIDTH] IN BLOOD BY AUTOMATED COUNT: 14.1 % (ref 11.5–14.5)
EST. GFR  (AFRICAN AMERICAN): >60 ML/MIN/1.73 M^2
EST. GFR  (NON AFRICAN AMERICAN): >60 ML/MIN/1.73 M^2
GLUCOSE SERPL-MCNC: 108 MG/DL (ref 70–110)
HCT VFR BLD AUTO: 35.1 % (ref 40–54)
HDLC SERPL-MCNC: 35 MG/DL (ref 40–75)
HDLC SERPL: 44.3 % (ref 20–50)
HGB BLD-MCNC: 10.4 G/DL (ref 14–18)
IMM GRANULOCYTES # BLD AUTO: 0.03 K/UL (ref 0–0.04)
IMM GRANULOCYTES NFR BLD AUTO: 0.3 % (ref 0–0.5)
LDLC SERPL CALC-MCNC: 35.2 MG/DL (ref 63–159)
LYMPHOCYTES # BLD AUTO: 1.7 K/UL (ref 1–4.8)
LYMPHOCYTES NFR BLD: 16.6 % (ref 18–48)
MCH RBC QN AUTO: 27.4 PG (ref 27–31)
MCHC RBC AUTO-ENTMCNC: 29.6 G/DL (ref 32–36)
MCV RBC AUTO: 93 FL (ref 82–98)
MONOCYTES # BLD AUTO: 0.9 K/UL (ref 0.3–1)
MONOCYTES NFR BLD: 9.3 % (ref 4–15)
NEUTROPHILS # BLD AUTO: 7 K/UL (ref 1.8–7.7)
NEUTROPHILS NFR BLD: 69.7 % (ref 38–73)
NONHDLC SERPL-MCNC: 44 MG/DL
NRBC BLD-RTO: 0 /100 WBC
PLATELET # BLD AUTO: 219 K/UL (ref 150–350)
PMV BLD AUTO: 10.9 FL (ref 9.2–12.9)
POTASSIUM SERPL-SCNC: 4.5 MMOL/L (ref 3.5–5.1)
PROT SERPL-MCNC: 7 G/DL (ref 6–8.4)
RBC # BLD AUTO: 3.79 M/UL (ref 4.6–6.2)
SODIUM SERPL-SCNC: 142 MMOL/L (ref 136–145)
TRIGL SERPL-MCNC: 44 MG/DL (ref 30–150)
TSH SERPL DL<=0.005 MIU/L-ACNC: 0.56 UIU/ML (ref 0.4–4)
WBC # BLD AUTO: 10.03 K/UL (ref 3.9–12.7)

## 2020-12-30 PROCEDURE — 36415 COLL VENOUS BLD VENIPUNCTURE: CPT

## 2020-12-30 PROCEDURE — 3078F PR MOST RECENT DIASTOLIC BLOOD PRESSURE < 80 MM HG: ICD-10-PCS | Mod: CPTII,S$GLB,, | Performed by: INTERNAL MEDICINE

## 2020-12-30 PROCEDURE — 3074F SYST BP LT 130 MM HG: CPT | Mod: CPTII,S$GLB,, | Performed by: INTERNAL MEDICINE

## 2020-12-30 PROCEDURE — 85025 COMPLETE CBC W/AUTO DIFF WBC: CPT

## 2020-12-30 PROCEDURE — 3288F FALL RISK ASSESSMENT DOCD: CPT | Mod: CPTII,S$GLB,, | Performed by: INTERNAL MEDICINE

## 2020-12-30 PROCEDURE — 84443 ASSAY THYROID STIM HORMONE: CPT

## 2020-12-30 PROCEDURE — 3288F PR FALLS RISK ASSESSMENT DOCUMENTED: ICD-10-PCS | Mod: CPTII,S$GLB,, | Performed by: INTERNAL MEDICINE

## 2020-12-30 PROCEDURE — 99999 PR PBB SHADOW E&M-EST. PATIENT-LVL V: CPT | Mod: PBBFAC,,, | Performed by: INTERNAL MEDICINE

## 2020-12-30 PROCEDURE — 3078F DIAST BP <80 MM HG: CPT | Mod: CPTII,S$GLB,, | Performed by: INTERNAL MEDICINE

## 2020-12-30 PROCEDURE — 99397 PR PREVENTIVE VISIT,EST,65 & OVER: ICD-10-PCS | Mod: S$GLB,,, | Performed by: INTERNAL MEDICINE

## 2020-12-30 PROCEDURE — 99397 PER PM REEVAL EST PAT 65+ YR: CPT | Mod: S$GLB,,, | Performed by: INTERNAL MEDICINE

## 2020-12-30 PROCEDURE — 80053 COMPREHEN METABOLIC PANEL: CPT

## 2020-12-30 PROCEDURE — 99999 PR PBB SHADOW E&M-EST. PATIENT-LVL V: ICD-10-PCS | Mod: PBBFAC,,, | Performed by: INTERNAL MEDICINE

## 2020-12-30 PROCEDURE — 1101F PT FALLS ASSESS-DOCD LE1/YR: CPT | Mod: CPTII,S$GLB,, | Performed by: INTERNAL MEDICINE

## 2020-12-30 PROCEDURE — 1126F PR PAIN SEVERITY QUANTIFIED, NO PAIN PRESENT: ICD-10-PCS | Mod: S$GLB,,, | Performed by: INTERNAL MEDICINE

## 2020-12-30 PROCEDURE — 80061 LIPID PANEL: CPT

## 2020-12-30 PROCEDURE — 3074F PR MOST RECENT SYSTOLIC BLOOD PRESSURE < 130 MM HG: ICD-10-PCS | Mod: CPTII,S$GLB,, | Performed by: INTERNAL MEDICINE

## 2020-12-30 PROCEDURE — 1126F AMNT PAIN NOTED NONE PRSNT: CPT | Mod: S$GLB,,, | Performed by: INTERNAL MEDICINE

## 2020-12-30 PROCEDURE — 1101F PR PT FALLS ASSESS DOC 0-1 FALLS W/OUT INJ PAST YR: ICD-10-PCS | Mod: CPTII,S$GLB,, | Performed by: INTERNAL MEDICINE

## 2020-12-30 RX ORDER — FAMOTIDINE 20 MG/1
20 TABLET, FILM COATED ORAL NIGHTLY PRN
COMMUNITY
End: 2021-08-02 | Stop reason: SDUPTHER

## 2020-12-30 RX ORDER — OXYCODONE AND ACETAMINOPHEN 5; 325 MG/1; MG/1
1 TABLET ORAL EVERY 12 HOURS PRN
Qty: 60 TABLET | Refills: 0 | Status: SHIPPED | OUTPATIENT
Start: 2020-12-30 | End: 2021-03-30 | Stop reason: SDUPTHER

## 2020-12-30 RX ORDER — NITROGLYCERIN 0.4 MG/1
0.4 TABLET SUBLINGUAL EVERY 5 MIN PRN
Qty: 25 TABLET | Refills: 3 | Status: SHIPPED | OUTPATIENT
Start: 2020-12-30 | End: 2022-11-07 | Stop reason: SDUPTHER

## 2020-12-30 RX ORDER — METOPROLOL TARTRATE 25 MG/1
12.5 TABLET, FILM COATED ORAL 2 TIMES DAILY
Qty: 30 TABLET | Refills: 11 | Status: ON HOLD | OUTPATIENT
Start: 2020-12-30 | End: 2021-04-06 | Stop reason: HOSPADM

## 2020-12-30 RX ORDER — LIDOCAINE AND PRILOCAINE 25; 25 MG/G; MG/G
CREAM TOPICAL DAILY PRN
Qty: 30 G | Refills: 12 | Status: ON HOLD | OUTPATIENT
Start: 2020-12-30 | End: 2021-04-06 | Stop reason: HOSPADM

## 2021-01-20 ENCOUNTER — PATIENT OUTREACH (OUTPATIENT)
Dept: ADMINISTRATIVE | Facility: OTHER | Age: 86
End: 2021-01-20

## 2021-02-18 ENCOUNTER — OFFICE VISIT (OUTPATIENT)
Dept: CARDIOLOGY | Facility: CLINIC | Age: 86
End: 2021-02-18
Payer: MEDICARE

## 2021-02-18 VITALS
SYSTOLIC BLOOD PRESSURE: 120 MMHG | HEIGHT: 65 IN | WEIGHT: 148.38 LBS | OXYGEN SATURATION: 99 % | BODY MASS INDEX: 24.72 KG/M2 | DIASTOLIC BLOOD PRESSURE: 67 MMHG | HEART RATE: 70 BPM

## 2021-02-18 DIAGNOSIS — E78.2 MIXED HYPERLIPIDEMIA: ICD-10-CM

## 2021-02-18 DIAGNOSIS — I71.40 ABDOMINAL AORTIC ANEURYSM WITHOUT RUPTURE: ICD-10-CM

## 2021-02-18 DIAGNOSIS — I10 HYPERTENSION, BENIGN: ICD-10-CM

## 2021-02-18 DIAGNOSIS — I25.810 CORONARY ARTERY DISEASE INVOLVING CORONARY BYPASS GRAFT OF NATIVE HEART WITHOUT ANGINA PECTORIS: Primary | ICD-10-CM

## 2021-02-18 DIAGNOSIS — I48.0 PAROXYSMAL ATRIAL FIBRILLATION: ICD-10-CM

## 2021-02-18 DIAGNOSIS — Z95.1 S/P CABG (CORONARY ARTERY BYPASS GRAFT): ICD-10-CM

## 2021-02-18 PROCEDURE — 99213 OFFICE O/P EST LOW 20 MIN: CPT | Mod: S$GLB,,, | Performed by: INTERNAL MEDICINE

## 2021-02-18 PROCEDURE — 99999 PR PBB SHADOW E&M-EST. PATIENT-LVL III: ICD-10-PCS | Mod: PBBFAC,,, | Performed by: INTERNAL MEDICINE

## 2021-02-18 PROCEDURE — 1125F PR PAIN SEVERITY QUANTIFIED, PAIN PRESENT: ICD-10-PCS | Mod: S$GLB,,, | Performed by: INTERNAL MEDICINE

## 2021-02-18 PROCEDURE — 3078F PR MOST RECENT DIASTOLIC BLOOD PRESSURE < 80 MM HG: ICD-10-PCS | Mod: CPTII,S$GLB,, | Performed by: INTERNAL MEDICINE

## 2021-02-18 PROCEDURE — 99999 PR PBB SHADOW E&M-EST. PATIENT-LVL III: CPT | Mod: PBBFAC,,, | Performed by: INTERNAL MEDICINE

## 2021-02-18 PROCEDURE — 3074F PR MOST RECENT SYSTOLIC BLOOD PRESSURE < 130 MM HG: ICD-10-PCS | Mod: CPTII,S$GLB,, | Performed by: INTERNAL MEDICINE

## 2021-02-18 PROCEDURE — 99213 PR OFFICE/OUTPT VISIT, EST, LEVL III, 20-29 MIN: ICD-10-PCS | Mod: S$GLB,,, | Performed by: INTERNAL MEDICINE

## 2021-02-18 PROCEDURE — 1125F AMNT PAIN NOTED PAIN PRSNT: CPT | Mod: S$GLB,,, | Performed by: INTERNAL MEDICINE

## 2021-02-18 PROCEDURE — 1159F MED LIST DOCD IN RCRD: CPT | Mod: S$GLB,,, | Performed by: INTERNAL MEDICINE

## 2021-02-18 PROCEDURE — 1159F PR MEDICATION LIST DOCUMENTED IN MEDICAL RECORD: ICD-10-PCS | Mod: S$GLB,,, | Performed by: INTERNAL MEDICINE

## 2021-02-18 PROCEDURE — 3078F DIAST BP <80 MM HG: CPT | Mod: CPTII,S$GLB,, | Performed by: INTERNAL MEDICINE

## 2021-02-18 PROCEDURE — 3074F SYST BP LT 130 MM HG: CPT | Mod: CPTII,S$GLB,, | Performed by: INTERNAL MEDICINE

## 2021-03-09 DIAGNOSIS — E89.0 POSTOPERATIVE HYPOTHYROIDISM: Primary | ICD-10-CM

## 2021-03-09 RX ORDER — LEVOTHYROXINE SODIUM 125 UG/1
TABLET ORAL
Qty: 90 TABLET | Refills: 3 | Status: SHIPPED | OUTPATIENT
Start: 2021-03-09 | End: 2021-08-02 | Stop reason: SDUPTHER

## 2021-03-28 RX ORDER — AMLODIPINE BESYLATE 10 MG/1
TABLET ORAL
Qty: 90 TABLET | Refills: 3 | Status: ON HOLD | OUTPATIENT
Start: 2021-03-28 | End: 2021-04-06 | Stop reason: HOSPADM

## 2021-03-30 RX ORDER — OXYCODONE AND ACETAMINOPHEN 5; 325 MG/1; MG/1
1 TABLET ORAL EVERY 12 HOURS PRN
Qty: 60 TABLET | Refills: 0 | Status: SHIPPED | OUTPATIENT
Start: 2021-03-30 | End: 2021-06-28 | Stop reason: SDUPTHER

## 2021-04-04 PROBLEM — T14.8XXA TRAUMATIC INJURY TO SKIN OR SUBCUTANEOUS TISSUE: Status: ACTIVE | Noted: 2021-04-04

## 2021-04-04 PROBLEM — R55 SYNCOPE: Status: ACTIVE | Noted: 2021-04-04
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
## 2021-04-14 ENCOUNTER — PATIENT MESSAGE (OUTPATIENT)
Dept: CARDIOLOGY | Facility: CLINIC | Age: 86
End: 2021-04-14

## 2021-04-20 ENCOUNTER — PATIENT OUTREACH (OUTPATIENT)
Dept: ADMINISTRATIVE | Facility: OTHER | Age: 86
End: 2021-04-20

## 2021-04-22 ENCOUNTER — OFFICE VISIT (OUTPATIENT)
Dept: CARDIOLOGY | Facility: CLINIC | Age: 86
End: 2021-04-22
Payer: MEDICARE

## 2021-04-22 VITALS
HEART RATE: 76 BPM | BODY MASS INDEX: 23.83 KG/M2 | DIASTOLIC BLOOD PRESSURE: 93 MMHG | SYSTOLIC BLOOD PRESSURE: 157 MMHG | HEIGHT: 65 IN | WEIGHT: 143.06 LBS | OXYGEN SATURATION: 97 %

## 2021-04-22 DIAGNOSIS — I25.810 CORONARY ARTERY DISEASE INVOLVING CORONARY BYPASS GRAFT OF NATIVE HEART WITHOUT ANGINA PECTORIS: ICD-10-CM

## 2021-04-22 DIAGNOSIS — Z95.1 S/P CABG (CORONARY ARTERY BYPASS GRAFT): ICD-10-CM

## 2021-04-22 DIAGNOSIS — I48.19 PERSISTENT ATRIAL FIBRILLATION: ICD-10-CM

## 2021-04-22 DIAGNOSIS — R55 SYNCOPE AND COLLAPSE: Primary | ICD-10-CM

## 2021-04-22 DIAGNOSIS — I10 HYPERTENSION, BENIGN: ICD-10-CM

## 2021-04-22 PROCEDURE — 99213 PR OFFICE/OUTPT VISIT, EST, LEVL III, 20-29 MIN: ICD-10-PCS | Mod: S$GLB,,, | Performed by: INTERNAL MEDICINE

## 2021-04-22 PROCEDURE — 1125F AMNT PAIN NOTED PAIN PRSNT: CPT | Mod: S$GLB,,, | Performed by: INTERNAL MEDICINE

## 2021-04-22 PROCEDURE — 99999 PR PBB SHADOW E&M-EST. PATIENT-LVL IV: ICD-10-PCS | Mod: PBBFAC,,, | Performed by: INTERNAL MEDICINE

## 2021-04-22 PROCEDURE — 1159F PR MEDICATION LIST DOCUMENTED IN MEDICAL RECORD: ICD-10-PCS | Mod: S$GLB,,, | Performed by: INTERNAL MEDICINE

## 2021-04-22 PROCEDURE — 99213 OFFICE O/P EST LOW 20 MIN: CPT | Mod: S$GLB,,, | Performed by: INTERNAL MEDICINE

## 2021-04-22 PROCEDURE — 1125F PR PAIN SEVERITY QUANTIFIED, PAIN PRESENT: ICD-10-PCS | Mod: S$GLB,,, | Performed by: INTERNAL MEDICINE

## 2021-04-22 PROCEDURE — 1159F MED LIST DOCD IN RCRD: CPT | Mod: S$GLB,,, | Performed by: INTERNAL MEDICINE

## 2021-04-22 PROCEDURE — 99999 PR PBB SHADOW E&M-EST. PATIENT-LVL IV: CPT | Mod: PBBFAC,,, | Performed by: INTERNAL MEDICINE

## 2021-04-22 RX ORDER — APIXABAN 5 MG/1
5 TABLET, FILM COATED ORAL 2 TIMES DAILY
COMMUNITY
Start: 2021-04-15 | End: 2021-08-02

## 2021-04-29 ENCOUNTER — TELEPHONE (OUTPATIENT)
Dept: CARDIOLOGY | Facility: CLINIC | Age: 86
End: 2021-04-29

## 2021-05-03 ENCOUNTER — CLINICAL SUPPORT (OUTPATIENT)
Dept: CARDIOLOGY | Facility: HOSPITAL | Age: 86
End: 2021-05-03
Attending: INTERNAL MEDICINE
Payer: MEDICARE

## 2021-05-03 ENCOUNTER — HOSPITAL ENCOUNTER (OUTPATIENT)
Dept: CARDIOLOGY | Facility: HOSPITAL | Age: 86
Discharge: HOME | End: 2021-05-03
Attending: INTERNAL MEDICINE
Payer: MEDICARE

## 2021-05-03 VITALS — HEART RATE: 85 BPM | SYSTOLIC BLOOD PRESSURE: 141 MMHG | DIASTOLIC BLOOD PRESSURE: 82 MMHG

## 2021-05-03 DIAGNOSIS — Z95.1 S/P CABG (CORONARY ARTERY BYPASS GRAFT): ICD-10-CM

## 2021-05-03 DIAGNOSIS — R55 SYNCOPE AND COLLAPSE: ICD-10-CM

## 2021-05-03 LAB
CFR FLOW - ANTERIOR: 1.47
CFR FLOW - INFERIOR: 1.38
CFR FLOW - LATERAL: 1.32
CFR FLOW - MAX: 2.23
CFR FLOW - MIN: 1.08
CFR FLOW - SEPTAL: 1.54
CFR FLOW - WHOLE HEART: 1.43
CV PHARM DOSE: 36.4 MG
CV STRESS BASE HR: 86 BPM
DIASTOLIC BLOOD PRESSURE: 75 MMHG
END DIASTOLIC INDEX-HIGH: 170 ML/M2
END SYSTOLIC INDEX-HIGH: 70 ML/M2
OHS CV CPX 85 PERCENT MAX PREDICTED HEART RATE MALE: 114
OHS CV CPX MAX PREDICTED HEART RATE: 134
OHS CV CPX PATIENT IS FEMALE: 0
OHS CV CPX PATIENT IS MALE: 1
OHS CV CPX PEAK DIASTOLIC BLOOD PRESSURE: 72 MMHG
OHS CV CPX PEAK HEAR RATE: 88 BPM
OHS CV CPX PEAK RATE PRESSURE PRODUCT: NORMAL
OHS CV CPX PEAK SYSTOLIC BLOOD PRESSURE: 188 MMHG
OHS CV CPX PERCENT MAX PREDICTED HEART RATE ACHIEVED: 66
OHS CV CPX RATE PRESSURE PRODUCT PRESENTING: NORMAL
REST FLOW - ANTERIOR: 1.97 CC/MIN/G
REST FLOW - INFERIOR: 1.76 CC/MIN/G
REST FLOW - LATERAL: 1.97 CC/MIN/G
REST FLOW - MAX: 2.56 CC/MIN/G
REST FLOW - MIN: 0.47 CC/MIN/G
REST FLOW - SEPTAL: 1.43 CC/MIN/G
REST FLOW - WHOLE HEART: 1.78 CC/MIN/G
RETIRED EF AND QEF - SEE NOTES: 51 %
STRESS FLOW - ANTERIOR: 2.87 CC/MIN/G
STRESS FLOW - INFERIOR: 2.41 CC/MIN/G
STRESS FLOW - LATERAL: 2.56 CC/MIN/G
STRESS FLOW - MAX: 3.39 CC/MIN/G
STRESS FLOW - MIN: 0.67 CC/MIN/G
STRESS FLOW - SEPTAL: 2.18 CC/MIN/G
STRESS FLOW - WHOLE HEART: 2.51 CC/MIN/G
SYSTOLIC BLOOD PRESSURE: 156 MMHG

## 2021-05-03 PROCEDURE — 63600175 PHARM REV CODE 636 W HCPCS: Performed by: INTERNAL MEDICINE

## 2021-05-03 PROCEDURE — 78434 AQMBF PET REST & RX STRESS: CPT

## 2021-05-03 PROCEDURE — 93272 CARDIAC EVENT MONITOR (CUPID ONLY): ICD-10-PCS | Mod: ,,, | Performed by: STUDENT IN AN ORGANIZED HEALTH CARE EDUCATION/TRAINING PROGRAM

## 2021-05-03 PROCEDURE — 78434 AQMBF PET REST & RX STRESS: CPT | Mod: 26,,, | Performed by: INTERNAL MEDICINE

## 2021-05-03 PROCEDURE — 93018 CARDIAC PET SCAN STRESS (CUPID ONLY): ICD-10-PCS | Mod: ,,, | Performed by: INTERNAL MEDICINE

## 2021-05-03 PROCEDURE — 93016 CV STRESS TEST SUPVJ ONLY: CPT | Mod: ,,, | Performed by: INTERNAL MEDICINE

## 2021-05-03 PROCEDURE — 78492 CARDIAC PET SCAN STRESS (CUPID ONLY): ICD-10-PCS | Mod: 26,,, | Performed by: INTERNAL MEDICINE

## 2021-05-03 PROCEDURE — 78492 MYOCRD IMG PET MLT RST&STRS: CPT | Mod: 26,,, | Performed by: INTERNAL MEDICINE

## 2021-05-03 PROCEDURE — 93018 CV STRESS TEST I&R ONLY: CPT | Mod: ,,, | Performed by: INTERNAL MEDICINE

## 2021-05-03 PROCEDURE — 93016 CARDIAC PET SCAN STRESS (CUPID ONLY): ICD-10-PCS | Mod: ,,, | Performed by: INTERNAL MEDICINE

## 2021-05-03 PROCEDURE — 93271 ECG/MONITORING AND ANALYSIS: CPT

## 2021-05-03 PROCEDURE — 78434 CARDIAC PET SCAN STRESS (CUPID ONLY): ICD-10-PCS | Mod: 26,,, | Performed by: INTERNAL MEDICINE

## 2021-05-03 PROCEDURE — 93272 ECG/REVIEW INTERPRET ONLY: CPT | Mod: ,,, | Performed by: STUDENT IN AN ORGANIZED HEALTH CARE EDUCATION/TRAINING PROGRAM

## 2021-05-03 RX ORDER — DIPYRIDAMOLE 5 MG/ML
36.4 INJECTION INTRAVENOUS ONCE
Status: COMPLETED | OUTPATIENT
Start: 2021-05-03 | End: 2021-05-03

## 2021-05-03 RX ADMIN — DIPYRIDAMOLE 36.4 MG: 5 INJECTION INTRAVENOUS at 01:05

## 2021-05-05 ENCOUNTER — TELEPHONE (OUTPATIENT)
Dept: CARDIOLOGY | Facility: CLINIC | Age: 86
End: 2021-05-05

## 2021-06-10 ENCOUNTER — PATIENT MESSAGE (OUTPATIENT)
Dept: CARDIOLOGY | Facility: CLINIC | Age: 86
End: 2021-06-10

## 2021-06-17 ENCOUNTER — TELEPHONE (OUTPATIENT)
Dept: INTERNAL MEDICINE | Facility: CLINIC | Age: 86
End: 2021-06-17

## 2021-06-18 ENCOUNTER — TELEPHONE (OUTPATIENT)
Dept: INTERNAL MEDICINE | Facility: CLINIC | Age: 86
End: 2021-06-18

## 2021-06-21 ENCOUNTER — OFFICE VISIT (OUTPATIENT)
Dept: INTERNAL MEDICINE | Facility: CLINIC | Age: 86
End: 2021-06-21
Payer: MEDICARE

## 2021-06-21 VITALS
OXYGEN SATURATION: 92 % | BODY MASS INDEX: 24.12 KG/M2 | DIASTOLIC BLOOD PRESSURE: 66 MMHG | WEIGHT: 144.81 LBS | RESPIRATION RATE: 16 BRPM | HEART RATE: 91 BPM | HEIGHT: 65 IN | SYSTOLIC BLOOD PRESSURE: 126 MMHG

## 2021-06-21 DIAGNOSIS — I48.0 PAROXYSMAL ATRIAL FIBRILLATION: Primary | ICD-10-CM

## 2021-06-21 DIAGNOSIS — I10 ESSENTIAL HYPERTENSION: ICD-10-CM

## 2021-06-21 DIAGNOSIS — M48.02 CERVICAL STENOSIS OF SPINE: ICD-10-CM

## 2021-06-21 DIAGNOSIS — Z87.898 HX OF SYNCOPE: ICD-10-CM

## 2021-06-21 PROCEDURE — 99213 PR OFFICE/OUTPT VISIT, EST, LEVL III, 20-29 MIN: ICD-10-PCS | Mod: S$GLB,,, | Performed by: PHYSICIAN ASSISTANT

## 2021-06-21 PROCEDURE — 99214 OFFICE O/P EST MOD 30 MIN: CPT | Mod: PBBFAC | Performed by: PHYSICIAN ASSISTANT

## 2021-06-21 PROCEDURE — 99999 PR PBB SHADOW E&M-EST. PATIENT-LVL IV: CPT | Mod: PBBFAC,,, | Performed by: PHYSICIAN ASSISTANT

## 2021-06-21 PROCEDURE — 99999 PR PBB SHADOW E&M-EST. PATIENT-LVL IV: ICD-10-PCS | Mod: PBBFAC,,, | Performed by: PHYSICIAN ASSISTANT

## 2021-06-21 PROCEDURE — 99213 OFFICE O/P EST LOW 20 MIN: CPT | Mod: S$GLB,,, | Performed by: PHYSICIAN ASSISTANT

## 2021-06-28 RX ORDER — OXYCODONE AND ACETAMINOPHEN 5; 325 MG/1; MG/1
1 TABLET ORAL EVERY 12 HOURS PRN
Qty: 60 TABLET | Refills: 0 | Status: SHIPPED | OUTPATIENT
Start: 2021-06-28 | End: 2021-08-02 | Stop reason: SDUPTHER

## 2021-08-02 ENCOUNTER — LAB VISIT (OUTPATIENT)
Dept: LAB | Facility: HOSPITAL | Age: 86
End: 2021-08-02
Attending: INTERNAL MEDICINE
Payer: MEDICARE

## 2021-08-02 ENCOUNTER — PATIENT MESSAGE (OUTPATIENT)
Dept: INTERNAL MEDICINE | Facility: CLINIC | Age: 86
End: 2021-08-02

## 2021-08-02 ENCOUNTER — OFFICE VISIT (OUTPATIENT)
Dept: INTERNAL MEDICINE | Facility: CLINIC | Age: 86
End: 2021-08-02
Payer: MEDICARE

## 2021-08-02 ENCOUNTER — PATIENT MESSAGE (OUTPATIENT)
Dept: CARDIOLOGY | Facility: CLINIC | Age: 86
End: 2021-08-02

## 2021-08-02 VITALS
WEIGHT: 141.75 LBS | SYSTOLIC BLOOD PRESSURE: 132 MMHG | DIASTOLIC BLOOD PRESSURE: 84 MMHG | HEART RATE: 49 BPM | BODY MASS INDEX: 23.62 KG/M2 | HEIGHT: 65 IN | OXYGEN SATURATION: 100 %

## 2021-08-02 DIAGNOSIS — I48.0 PAROXYSMAL ATRIAL FIBRILLATION: ICD-10-CM

## 2021-08-02 DIAGNOSIS — I63.9 CEREBROVASCULAR ACCIDENT (CVA), UNSPECIFIED MECHANISM: ICD-10-CM

## 2021-08-02 DIAGNOSIS — R26.81 GAIT INSTABILITY: ICD-10-CM

## 2021-08-02 DIAGNOSIS — E78.2 MIXED HYPERLIPIDEMIA: ICD-10-CM

## 2021-08-02 DIAGNOSIS — E89.0 POSTOPERATIVE HYPOTHYROIDISM: ICD-10-CM

## 2021-08-02 DIAGNOSIS — I10 ESSENTIAL HYPERTENSION: ICD-10-CM

## 2021-08-02 DIAGNOSIS — I48.0 PAROXYSMAL ATRIAL FIBRILLATION: Primary | ICD-10-CM

## 2021-08-02 LAB
ALBUMIN SERPL BCP-MCNC: 3.8 G/DL (ref 3.5–5.2)
ALP SERPL-CCNC: 117 U/L (ref 55–135)
ALT SERPL W/O P-5'-P-CCNC: 13 U/L (ref 10–44)
ANION GAP SERPL CALC-SCNC: 13 MMOL/L (ref 8–16)
AST SERPL-CCNC: 18 U/L (ref 10–40)
BASOPHILS # BLD AUTO: 0.04 K/UL (ref 0–0.2)
BASOPHILS NFR BLD: 0.6 % (ref 0–1.9)
BILIRUB SERPL-MCNC: 0.7 MG/DL (ref 0.1–1)
BUN SERPL-MCNC: 25 MG/DL (ref 8–23)
CALCIUM SERPL-MCNC: 10.2 MG/DL (ref 8.7–10.5)
CHLORIDE SERPL-SCNC: 106 MMOL/L (ref 95–110)
CO2 SERPL-SCNC: 25 MMOL/L (ref 23–29)
CREAT SERPL-MCNC: 0.9 MG/DL (ref 0.5–1.4)
DIFFERENTIAL METHOD: ABNORMAL
EOSINOPHIL # BLD AUTO: 0.2 K/UL (ref 0–0.5)
EOSINOPHIL NFR BLD: 3.1 % (ref 0–8)
ERYTHROCYTE [DISTWIDTH] IN BLOOD BY AUTOMATED COUNT: 14.6 % (ref 11.5–14.5)
EST. GFR  (AFRICAN AMERICAN): >60 ML/MIN/1.73 M^2
EST. GFR  (NON AFRICAN AMERICAN): >60 ML/MIN/1.73 M^2
GLUCOSE SERPL-MCNC: 144 MG/DL (ref 70–110)
HCT VFR BLD AUTO: 38.9 % (ref 40–54)
HGB BLD-MCNC: 12.2 G/DL (ref 14–18)
IMM GRANULOCYTES # BLD AUTO: 0.02 K/UL (ref 0–0.04)
IMM GRANULOCYTES NFR BLD AUTO: 0.3 % (ref 0–0.5)
LYMPHOCYTES # BLD AUTO: 1.3 K/UL (ref 1–4.8)
LYMPHOCYTES NFR BLD: 20.8 % (ref 18–48)
MCH RBC QN AUTO: 27.9 PG (ref 27–31)
MCHC RBC AUTO-ENTMCNC: 31.4 G/DL (ref 32–36)
MCV RBC AUTO: 89 FL (ref 82–98)
MONOCYTES # BLD AUTO: 0.7 K/UL (ref 0.3–1)
MONOCYTES NFR BLD: 11.5 % (ref 4–15)
NEUTROPHILS # BLD AUTO: 4.1 K/UL (ref 1.8–7.7)
NEUTROPHILS NFR BLD: 63.7 % (ref 38–73)
NRBC BLD-RTO: 0 /100 WBC
PLATELET # BLD AUTO: 207 K/UL (ref 150–450)
PMV BLD AUTO: 11.2 FL (ref 9.2–12.9)
POTASSIUM SERPL-SCNC: 4.6 MMOL/L (ref 3.5–5.1)
PROT SERPL-MCNC: 7.1 G/DL (ref 6–8.4)
RBC # BLD AUTO: 4.38 M/UL (ref 4.6–6.2)
SODIUM SERPL-SCNC: 144 MMOL/L (ref 136–145)
TSH SERPL DL<=0.005 MIU/L-ACNC: 2.36 UIU/ML (ref 0.4–4)
WBC # BLD AUTO: 6.44 K/UL (ref 3.9–12.7)

## 2021-08-02 PROCEDURE — 3288F FALL RISK ASSESSMENT DOCD: CPT | Mod: CPTII,S$GLB,, | Performed by: INTERNAL MEDICINE

## 2021-08-02 PROCEDURE — 99214 OFFICE O/P EST MOD 30 MIN: CPT | Mod: S$GLB,,, | Performed by: INTERNAL MEDICINE

## 2021-08-02 PROCEDURE — 84443 ASSAY THYROID STIM HORMONE: CPT | Performed by: INTERNAL MEDICINE

## 2021-08-02 PROCEDURE — 1126F PR PAIN SEVERITY QUANTIFIED, NO PAIN PRESENT: ICD-10-PCS | Mod: CPTII,S$GLB,, | Performed by: INTERNAL MEDICINE

## 2021-08-02 PROCEDURE — 99999 PR PBB SHADOW E&M-EST. PATIENT-LVL IV: ICD-10-PCS | Mod: PBBFAC,,, | Performed by: INTERNAL MEDICINE

## 2021-08-02 PROCEDURE — 85025 COMPLETE CBC W/AUTO DIFF WBC: CPT | Performed by: INTERNAL MEDICINE

## 2021-08-02 PROCEDURE — 1159F MED LIST DOCD IN RCRD: CPT | Mod: CPTII,S$GLB,, | Performed by: INTERNAL MEDICINE

## 2021-08-02 PROCEDURE — 1160F PR REVIEW ALL MEDS BY PRESCRIBER/CLIN PHARMACIST DOCUMENTED: ICD-10-PCS | Mod: CPTII,S$GLB,, | Performed by: INTERNAL MEDICINE

## 2021-08-02 PROCEDURE — 3288F PR FALLS RISK ASSESSMENT DOCUMENTED: ICD-10-PCS | Mod: CPTII,S$GLB,, | Performed by: INTERNAL MEDICINE

## 2021-08-02 PROCEDURE — 1159F PR MEDICATION LIST DOCUMENTED IN MEDICAL RECORD: ICD-10-PCS | Mod: CPTII,S$GLB,, | Performed by: INTERNAL MEDICINE

## 2021-08-02 PROCEDURE — 99999 PR PBB SHADOW E&M-EST. PATIENT-LVL IV: CPT | Mod: PBBFAC,,, | Performed by: INTERNAL MEDICINE

## 2021-08-02 PROCEDURE — 99214 PR OFFICE/OUTPT VISIT, EST, LEVL IV, 30-39 MIN: ICD-10-PCS | Mod: S$GLB,,, | Performed by: INTERNAL MEDICINE

## 2021-08-02 PROCEDURE — 1160F RVW MEDS BY RX/DR IN RCRD: CPT | Mod: CPTII,S$GLB,, | Performed by: INTERNAL MEDICINE

## 2021-08-02 PROCEDURE — 1126F AMNT PAIN NOTED NONE PRSNT: CPT | Mod: CPTII,S$GLB,, | Performed by: INTERNAL MEDICINE

## 2021-08-02 PROCEDURE — 1100F PR PT FALLS ASSESS DOC 2+ FALLS/FALL W/INJURY/YR: ICD-10-PCS | Mod: CPTII,S$GLB,, | Performed by: INTERNAL MEDICINE

## 2021-08-02 PROCEDURE — 80053 COMPREHEN METABOLIC PANEL: CPT | Performed by: INTERNAL MEDICINE

## 2021-08-02 PROCEDURE — 36415 COLL VENOUS BLD VENIPUNCTURE: CPT | Performed by: INTERNAL MEDICINE

## 2021-08-02 PROCEDURE — 1100F PTFALLS ASSESS-DOCD GE2>/YR: CPT | Mod: CPTII,S$GLB,, | Performed by: INTERNAL MEDICINE

## 2021-08-02 RX ORDER — ATORVASTATIN CALCIUM 40 MG/1
40 TABLET, FILM COATED ORAL DAILY
Qty: 90 TABLET | Refills: 3 | Status: SHIPPED | OUTPATIENT
Start: 2021-08-02 | End: 2022-09-29 | Stop reason: SDUPTHER

## 2021-08-02 RX ORDER — OXYCODONE AND ACETAMINOPHEN 5; 325 MG/1; MG/1
1 TABLET ORAL EVERY 12 HOURS PRN
Qty: 60 TABLET | Refills: 0 | Status: SHIPPED | OUTPATIENT
Start: 2021-08-02 | End: 2021-10-11 | Stop reason: SDUPTHER

## 2021-08-02 RX ORDER — FAMOTIDINE 20 MG/1
20 TABLET, FILM COATED ORAL NIGHTLY PRN
Qty: 90 TABLET | Refills: 3 | Status: SHIPPED | OUTPATIENT
Start: 2021-08-02 | End: 2022-08-25 | Stop reason: SDUPTHER

## 2021-08-02 RX ORDER — LEVOTHYROXINE SODIUM 125 UG/1
125 TABLET ORAL EVERY MORNING
Qty: 90 TABLET | Refills: 3 | Status: SHIPPED | OUTPATIENT
Start: 2021-08-02 | End: 2022-09-29 | Stop reason: SDUPTHER

## 2021-08-03 ENCOUNTER — PATIENT MESSAGE (OUTPATIENT)
Dept: INTERNAL MEDICINE | Facility: CLINIC | Age: 86
End: 2021-08-03

## 2021-08-06 ENCOUNTER — TELEPHONE (OUTPATIENT)
Dept: CARDIOLOGY | Facility: CLINIC | Age: 86
End: 2021-08-06

## 2021-08-06 DIAGNOSIS — I48.0 PAROXYSMAL ATRIAL FIBRILLATION: Primary | ICD-10-CM

## 2021-08-13 ENCOUNTER — PATIENT OUTREACH (OUTPATIENT)
Dept: ADMINISTRATIVE | Facility: OTHER | Age: 86
End: 2021-08-13

## 2021-08-16 ENCOUNTER — TELEPHONE (OUTPATIENT)
Dept: CARDIOLOGY | Facility: HOSPITAL | Age: 86
End: 2021-08-16

## 2021-08-17 ENCOUNTER — HOSPITAL ENCOUNTER (OUTPATIENT)
Dept: CARDIOLOGY | Facility: CLINIC | Age: 86
Discharge: HOME | End: 2021-08-17
Payer: MEDICARE

## 2021-08-17 ENCOUNTER — OFFICE VISIT (OUTPATIENT)
Dept: ELECTROPHYSIOLOGY | Facility: CLINIC | Age: 86
End: 2021-08-17
Payer: MEDICARE

## 2021-08-17 VITALS
DIASTOLIC BLOOD PRESSURE: 82 MMHG | SYSTOLIC BLOOD PRESSURE: 171 MMHG | BODY MASS INDEX: 22.03 KG/M2 | WEIGHT: 132.25 LBS | HEIGHT: 65 IN | HEART RATE: 88 BPM

## 2021-08-17 DIAGNOSIS — I48.0 PAROXYSMAL ATRIAL FIBRILLATION: ICD-10-CM

## 2021-08-17 DIAGNOSIS — R55 SYNCOPE, UNSPECIFIED SYNCOPE TYPE: Primary | ICD-10-CM

## 2021-08-17 DIAGNOSIS — I25.10 CORONARY ARTERY DISEASE INVOLVING NATIVE CORONARY ARTERY OF NATIVE HEART WITHOUT ANGINA PECTORIS: ICD-10-CM

## 2021-08-17 DIAGNOSIS — Z95.1 S/P CABG (CORONARY ARTERY BYPASS GRAFT): ICD-10-CM

## 2021-08-17 DIAGNOSIS — I10 HYPERTENSION, BENIGN: ICD-10-CM

## 2021-08-17 PROCEDURE — 99999 PR PBB SHADOW E&M-EST. PATIENT-LVL III: CPT | Mod: PBBFAC,,, | Performed by: INTERNAL MEDICINE

## 2021-08-17 PROCEDURE — 99205 PR OFFICE/OUTPT VISIT, NEW, LEVL V, 60-74 MIN: ICD-10-PCS | Mod: S$GLB,,, | Performed by: INTERNAL MEDICINE

## 2021-08-17 PROCEDURE — 99999 PR PBB SHADOW E&M-EST. PATIENT-LVL III: ICD-10-PCS | Mod: PBBFAC,,, | Performed by: INTERNAL MEDICINE

## 2021-08-17 PROCEDURE — 1160F RVW MEDS BY RX/DR IN RCRD: CPT | Mod: CPTII,S$GLB,, | Performed by: INTERNAL MEDICINE

## 2021-08-17 PROCEDURE — 99205 OFFICE O/P NEW HI 60 MIN: CPT | Mod: S$GLB,,, | Performed by: INTERNAL MEDICINE

## 2021-08-17 PROCEDURE — 3288F FALL RISK ASSESSMENT DOCD: CPT | Mod: CPTII,S$GLB,, | Performed by: INTERNAL MEDICINE

## 2021-08-17 PROCEDURE — 1159F PR MEDICATION LIST DOCUMENTED IN MEDICAL RECORD: ICD-10-PCS | Mod: CPTII,S$GLB,, | Performed by: INTERNAL MEDICINE

## 2021-08-17 PROCEDURE — 1100F PTFALLS ASSESS-DOCD GE2>/YR: CPT | Mod: CPTII,S$GLB,, | Performed by: INTERNAL MEDICINE

## 2021-08-17 PROCEDURE — 93010 ELECTROCARDIOGRAM REPORT: CPT | Mod: S$GLB,,, | Performed by: INTERNAL MEDICINE

## 2021-08-17 PROCEDURE — 93010 EKG 12-LEAD: ICD-10-PCS | Mod: S$GLB,,, | Performed by: INTERNAL MEDICINE

## 2021-08-17 PROCEDURE — 3288F PR FALLS RISK ASSESSMENT DOCUMENTED: ICD-10-PCS | Mod: CPTII,S$GLB,, | Performed by: INTERNAL MEDICINE

## 2021-08-17 PROCEDURE — 93005 EKG 12-LEAD: ICD-10-PCS | Mod: S$GLB,,, | Performed by: INTERNAL MEDICINE

## 2021-08-17 PROCEDURE — 1100F PR PT FALLS ASSESS DOC 2+ FALLS/FALL W/INJURY/YR: ICD-10-PCS | Mod: CPTII,S$GLB,, | Performed by: INTERNAL MEDICINE

## 2021-08-17 PROCEDURE — 1159F MED LIST DOCD IN RCRD: CPT | Mod: CPTII,S$GLB,, | Performed by: INTERNAL MEDICINE

## 2021-08-17 PROCEDURE — 1160F PR REVIEW ALL MEDS BY PRESCRIBER/CLIN PHARMACIST DOCUMENTED: ICD-10-PCS | Mod: CPTII,S$GLB,, | Performed by: INTERNAL MEDICINE

## 2021-08-17 PROCEDURE — 93005 ELECTROCARDIOGRAM TRACING: CPT | Mod: S$GLB,,, | Performed by: INTERNAL MEDICINE

## 2021-08-17 RX ORDER — CARVEDILOL 3.12 MG/1
3.12 TABLET ORAL 2 TIMES DAILY WITH MEALS
Qty: 60 TABLET | Refills: 11 | Status: SHIPPED | OUTPATIENT
Start: 2021-08-17 | End: 2022-02-14

## 2021-08-27 ENCOUNTER — TELEPHONE (OUTPATIENT)
Dept: ELECTROPHYSIOLOGY | Facility: CLINIC | Age: 86
End: 2021-08-27

## 2021-09-18 ENCOUNTER — PATIENT MESSAGE (OUTPATIENT)
Dept: ELECTROPHYSIOLOGY | Facility: CLINIC | Age: 86
End: 2021-09-18

## 2021-09-27 ENCOUNTER — PATIENT MESSAGE (OUTPATIENT)
Dept: ELECTROPHYSIOLOGY | Facility: CLINIC | Age: 86
End: 2021-09-27

## 2021-09-30 ENCOUNTER — TELEPHONE (OUTPATIENT)
Dept: ELECTROPHYSIOLOGY | Facility: CLINIC | Age: 86
End: 2021-09-30

## 2021-10-05 ENCOUNTER — TELEPHONE (OUTPATIENT)
Dept: ELECTROPHYSIOLOGY | Facility: CLINIC | Age: 86
End: 2021-10-05

## 2021-10-11 RX ORDER — OXYCODONE AND ACETAMINOPHEN 5; 325 MG/1; MG/1
1 TABLET ORAL EVERY 12 HOURS PRN
Qty: 60 TABLET | Refills: 0 | Status: SHIPPED | OUTPATIENT
Start: 2021-10-11 | End: 2021-12-16 | Stop reason: SDUPTHER

## 2021-10-27 ENCOUNTER — PATIENT MESSAGE (OUTPATIENT)
Dept: ELECTROPHYSIOLOGY | Facility: CLINIC | Age: 86
End: 2021-10-27
Payer: MEDICARE

## 2021-11-15 DIAGNOSIS — I49.8 OTHER SPECIFIED CARDIAC ARRHYTHMIAS: Primary | ICD-10-CM

## 2021-11-19 ENCOUNTER — PATIENT MESSAGE (OUTPATIENT)
Dept: ELECTROPHYSIOLOGY | Facility: CLINIC | Age: 86
End: 2021-11-19

## 2021-11-19 ENCOUNTER — OFFICE VISIT (OUTPATIENT)
Dept: ELECTROPHYSIOLOGY | Facility: CLINIC | Age: 86
End: 2021-11-19
Payer: MEDICARE

## 2021-11-19 ENCOUNTER — HOSPITAL ENCOUNTER (OUTPATIENT)
Dept: CARDIOLOGY | Facility: CLINIC | Age: 86
Discharge: HOME | End: 2021-11-19
Payer: MEDICARE

## 2021-11-19 VITALS
HEIGHT: 65 IN | HEART RATE: 74 BPM | DIASTOLIC BLOOD PRESSURE: 76 MMHG | BODY MASS INDEX: 24.46 KG/M2 | WEIGHT: 146.81 LBS | SYSTOLIC BLOOD PRESSURE: 166 MMHG

## 2021-11-19 DIAGNOSIS — I48.21 PERMANENT ATRIAL FIBRILLATION: ICD-10-CM

## 2021-11-19 DIAGNOSIS — R55 SYNCOPE, UNSPECIFIED SYNCOPE TYPE: Primary | ICD-10-CM

## 2021-11-19 DIAGNOSIS — I48.0 PAROXYSMAL ATRIAL FIBRILLATION: ICD-10-CM

## 2021-11-19 DIAGNOSIS — Z95.1 S/P CABG (CORONARY ARTERY BYPASS GRAFT): ICD-10-CM
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
## 2021-11-19 DIAGNOSIS — Z95.818 STATUS POST PLACEMENT OF IMPLANTABLE LOOP RECORDER: ICD-10-CM

## 2021-11-19 DIAGNOSIS — I95.1 ORTHOSTATIC HYPOTENSION: ICD-10-CM

## 2021-11-19 DIAGNOSIS — R00.1 BRADYCARDIA: ICD-10-CM

## 2021-11-19 DIAGNOSIS — I10 HYPERTENSION, BENIGN: ICD-10-CM

## 2021-11-19 DIAGNOSIS — I49.8 OTHER SPECIFIED CARDIAC ARRHYTHMIAS: ICD-10-CM

## 2021-11-19 PROCEDURE — 1159F PR MEDICATION LIST DOCUMENTED IN MEDICAL RECORD: ICD-10-PCS | Mod: HCNC,CPTII,S$GLB, | Performed by: INTERNAL MEDICINE

## 2021-11-19 PROCEDURE — 99499 UNLISTED E&M SERVICE: CPT | Mod: HCNC,S$GLB,, | Performed by: INTERNAL MEDICINE

## 2021-11-19 PROCEDURE — 93005 RHYTHM STRIP: ICD-10-PCS | Mod: HCNC,S$GLB,, | Performed by: INTERNAL MEDICINE

## 2021-11-19 PROCEDURE — 1160F RVW MEDS BY RX/DR IN RCRD: CPT | Mod: HCNC,CPTII,S$GLB, | Performed by: INTERNAL MEDICINE

## 2021-11-19 PROCEDURE — 1100F PR PT FALLS ASSESS DOC 2+ FALLS/FALL W/INJURY/YR: ICD-10-PCS | Mod: HCNC,CPTII,S$GLB, | Performed by: INTERNAL MEDICINE

## 2021-11-19 PROCEDURE — 93005 ELECTROCARDIOGRAM TRACING: CPT | Mod: HCNC,S$GLB,, | Performed by: INTERNAL MEDICINE

## 2021-11-19 PROCEDURE — 93010 RHYTHM STRIP: ICD-10-PCS | Mod: HCNC,S$GLB,, | Performed by: INTERNAL MEDICINE

## 2021-11-19 PROCEDURE — 1159F MED LIST DOCD IN RCRD: CPT | Mod: HCNC,CPTII,S$GLB, | Performed by: INTERNAL MEDICINE

## 2021-11-19 PROCEDURE — 3288F FALL RISK ASSESSMENT DOCD: CPT | Mod: HCNC,CPTII,S$GLB, | Performed by: INTERNAL MEDICINE

## 2021-11-19 PROCEDURE — 99215 PR OFFICE/OUTPT VISIT, EST, LEVL V, 40-54 MIN: ICD-10-PCS | Mod: HCNC,S$GLB,, | Performed by: INTERNAL MEDICINE

## 2021-11-19 PROCEDURE — 99999 PR PBB SHADOW E&M-EST. PATIENT-LVL III: CPT | Mod: PBBFAC,HCNC,, | Performed by: INTERNAL MEDICINE

## 2021-11-19 PROCEDURE — 3288F PR FALLS RISK ASSESSMENT DOCUMENTED: ICD-10-PCS | Mod: HCNC,CPTII,S$GLB, | Performed by: INTERNAL MEDICINE

## 2021-11-19 PROCEDURE — 93010 ELECTROCARDIOGRAM REPORT: CPT | Mod: HCNC,S$GLB,, | Performed by: INTERNAL MEDICINE

## 2021-11-19 PROCEDURE — 1100F PTFALLS ASSESS-DOCD GE2>/YR: CPT | Mod: HCNC,CPTII,S$GLB, | Performed by: INTERNAL MEDICINE

## 2021-11-19 PROCEDURE — 99215 OFFICE O/P EST HI 40 MIN: CPT | Mod: HCNC,S$GLB,, | Performed by: INTERNAL MEDICINE

## 2021-11-19 PROCEDURE — 1160F PR REVIEW ALL MEDS BY PRESCRIBER/CLIN PHARMACIST DOCUMENTED: ICD-10-PCS | Mod: HCNC,CPTII,S$GLB, | Performed by: INTERNAL MEDICINE

## 2021-11-19 PROCEDURE — 99499 RISK ADDL DX/OHS AUDIT: ICD-10-PCS | Mod: HCNC,S$GLB,, | Performed by: INTERNAL MEDICINE

## 2021-11-19 PROCEDURE — 99999 PR PBB SHADOW E&M-EST. PATIENT-LVL III: ICD-10-PCS | Mod: PBBFAC,HCNC,, | Performed by: INTERNAL MEDICINE

## 2021-11-24 ENCOUNTER — TELEPHONE (OUTPATIENT)
Dept: ELECTROPHYSIOLOGY | Facility: CLINIC | Age: 86
End: 2021-11-24
Payer: MEDICARE

## 2021-11-26 ENCOUNTER — HOSPITAL ENCOUNTER (OUTPATIENT)
Facility: HOSPITAL | Age: 86
Discharge: HOME | End: 2021-11-26
Attending: INTERNAL MEDICINE | Admitting: INTERNAL MEDICINE
Payer: MEDICARE

## 2021-11-26 VITALS
SYSTOLIC BLOOD PRESSURE: 168 MMHG | WEIGHT: 145 LBS | HEIGHT: 65 IN | TEMPERATURE: 97 F | HEART RATE: 78 BPM | OXYGEN SATURATION: 100 % | BODY MASS INDEX: 24.16 KG/M2 | RESPIRATION RATE: 18 BRPM | DIASTOLIC BLOOD PRESSURE: 89 MMHG

## 2021-11-26 DIAGNOSIS — R55 SYNCOPE, UNSPECIFIED SYNCOPE TYPE: ICD-10-CM

## 2021-11-26 DIAGNOSIS — R00.1 BRADYCARDIA: ICD-10-CM

## 2021-11-26 DIAGNOSIS — Z95.9 CARDIAC DEVICE IN SITU: ICD-10-CM

## 2021-11-26 DIAGNOSIS — I48.0 PAROXYSMAL ATRIAL FIBRILLATION: ICD-10-CM

## 2021-11-26 PROCEDURE — 93005 ELECTROCARDIOGRAM TRACING: CPT | Mod: HCNC

## 2021-11-26 PROCEDURE — 93010 ELECTROCARDIOGRAM REPORT: CPT | Mod: HCNC,,, | Performed by: INTERNAL MEDICINE

## 2021-11-26 PROCEDURE — 93010 EKG 12-LEAD: ICD-10-PCS | Mod: HCNC,,, | Performed by: INTERNAL MEDICINE

## 2021-11-26 PROCEDURE — 33285 PR INSERTION,SUBQ CARDIAC RHYTHM MONITOR, W/PRGRMG: ICD-10-PCS | Mod: HCNC,,, | Performed by: INTERNAL MEDICINE

## 2021-11-26 PROCEDURE — 33285 INSJ SUBQ CAR RHYTHM MNTR: CPT | Mod: HCNC | Performed by: INTERNAL MEDICINE

## 2021-11-26 PROCEDURE — C1764 EVENT RECORDER, CARDIAC: HCPCS | Mod: HCNC | Performed by: INTERNAL MEDICINE

## 2021-11-26 PROCEDURE — 33285 INSJ SUBQ CAR RHYTHM MNTR: CPT | Mod: HCNC,,, | Performed by: INTERNAL MEDICINE

## 2021-11-26 PROCEDURE — 63600175 PHARM REV CODE 636 W HCPCS: Mod: HCNC | Performed by: NURSE PRACTITIONER

## 2021-11-26 PROCEDURE — 25000003 PHARM REV CODE 250: Mod: HCNC | Performed by: INTERNAL MEDICINE

## 2021-11-26 DEVICE — MON LNQ11 REVEAL LINQ USA
Type: IMPLANTABLE DEVICE | Site: CHEST | Status: NON-FUNCTIONAL
Brand: REVEAL LINQ™
Removed: 2022-04-18

## 2021-11-26 RX ORDER — CEFAZOLIN SODIUM 1 G/3ML
2 INJECTION, POWDER, FOR SOLUTION INTRAMUSCULAR; INTRAVENOUS
Status: COMPLETED | OUTPATIENT
Start: 2021-11-26 | End: 2021-11-26

## 2021-11-26 RX ORDER — LIDOCAINE HYDROCHLORIDE AND EPINEPHRINE 20; 10 MG/ML; UG/ML
INJECTION, SOLUTION INFILTRATION; PERINEURAL
Status: DISCONTINUED | OUTPATIENT
Start: 2021-11-26 | End: 2021-11-26 | Stop reason: HOSPADM

## 2021-11-29 ENCOUNTER — TELEPHONE (OUTPATIENT)
Dept: CARDIOLOGY | Facility: HOSPITAL | Age: 86
End: 2021-11-29
Payer: MEDICARE

## 2021-12-03 ENCOUNTER — CLINICAL SUPPORT (OUTPATIENT)
Dept: CARDIOLOGY | Facility: HOSPITAL | Age: 86
End: 2021-12-03
Attending: INTERNAL MEDICINE
Payer: MEDICARE

## 2021-12-03 DIAGNOSIS — Z95.818 STATUS POST PLACEMENT OF IMPLANTABLE LOOP RECORDER: ICD-10-CM

## 2021-12-03 PROCEDURE — 93291 INTERROG DEV EVAL SCRMS IP: CPT | Mod: HCNC

## 2021-12-03 PROCEDURE — 93291 INTERROG DEV EVAL SCRMS IP: CPT | Mod: 26,HCNC,, | Performed by: INTERNAL MEDICINE

## 2021-12-03 PROCEDURE — 93291 CARDIAC DEVICE CHECK - IN CLINIC & HOSPITAL: ICD-10-PCS | Mod: 26,HCNC,, | Performed by: INTERNAL MEDICINE

## 2021-12-13 ENCOUNTER — TELEPHONE (OUTPATIENT)
Dept: ELECTROPHYSIOLOGY | Facility: CLINIC | Age: 86
End: 2021-12-13
Payer: MEDICARE

## 2021-12-16 RX ORDER — OXYCODONE AND ACETAMINOPHEN 5; 325 MG/1; MG/1
1 TABLET ORAL EVERY 12 HOURS PRN
Qty: 60 TABLET | Refills: 0 | Status: SHIPPED | OUTPATIENT
Start: 2021-12-16 | End: 2022-01-27 | Stop reason: SDUPTHER

## 2021-12-26 ENCOUNTER — CLINICAL SUPPORT (OUTPATIENT)
Dept: CARDIOLOGY | Facility: HOSPITAL | Age: 86
End: 2021-12-26
Payer: MEDICARE

## 2021-12-26 DIAGNOSIS — Z95.818 PRESENCE OF OTHER CARDIAC IMPLANTS AND GRAFTS: ICD-10-CM

## 2021-12-26 PROCEDURE — 93298 CARDIAC DEVICE CHECK - REMOTE: ICD-10-PCS | Mod: HCNC,,, | Performed by: INTERNAL MEDICINE

## 2021-12-26 PROCEDURE — G2066 INTER DEVC REMOTE 30D: HCPCS | Mod: HCNC | Performed by: INTERNAL MEDICINE

## 2021-12-26 PROCEDURE — 93298 REM INTERROG DEV EVAL SCRMS: CPT | Mod: HCNC,,, | Performed by: INTERNAL MEDICINE

## 2022-01-19 ENCOUNTER — TELEPHONE (OUTPATIENT)
Dept: ELECTROPHYSIOLOGY | Facility: CLINIC | Age: 87
End: 2022-01-19
Payer: MEDICARE

## 2022-01-25 ENCOUNTER — CLINICAL SUPPORT (OUTPATIENT)
Dept: CARDIOLOGY | Facility: HOSPITAL | Age: 87
End: 2022-01-25
Payer: MEDICARE

## 2022-01-25 DIAGNOSIS — Z95.818 PRESENCE OF OTHER CARDIAC IMPLANTS AND GRAFTS: ICD-10-CM

## 2022-01-25 PROCEDURE — G2066 INTER DEVC REMOTE 30D: HCPCS | Mod: HCNC | Performed by: INTERNAL MEDICINE

## 2022-01-25 PROCEDURE — 93298 CARDIAC DEVICE CHECK - REMOTE: ICD-10-PCS | Mod: HCNC,,, | Performed by: INTERNAL MEDICINE

## 2022-01-25 PROCEDURE — 93298 REM INTERROG DEV EVAL SCRMS: CPT | Mod: HCNC,,, | Performed by: INTERNAL MEDICINE

## 2022-01-27 NOTE — TELEPHONE ENCOUNTER
Care Due:                  Date            Visit Type   Department     Provider  --------------------------------------------------------------------------------                                             [facility]  Last Visit: 08-      None         MEDICINE       Rogelio Oliver  Next Visit: None Scheduled  None         None Found                                                            Last  Test          Frequency    Reason                     Performed    Due Date  --------------------------------------------------------------------------------    Lipid Panel.  12 months..  atorvastatin.............  Not Found    Overdue    Powered by [employer]. Reference number: [id number].   1/27/2022 5:37:45 PM CST

## 2022-01-28 RX ORDER — OXYCODONE AND ACETAMINOPHEN 5; 325 MG/1; MG/1
1 TABLET ORAL EVERY 12 HOURS PRN
Qty: 60 TABLET | Refills: 0 | Status: SHIPPED | OUTPATIENT
Start: 2022-01-28 | End: 2022-03-04 | Stop reason: SDUPTHER

## 2022-02-14 ENCOUNTER — TELEPHONE (OUTPATIENT)
Dept: ELECTROPHYSIOLOGY | Facility: CLINIC | Age: 87
End: 2022-02-14
Payer: MEDICARE

## 2022-02-14 RX ORDER — AMLODIPINE BESYLATE 5 MG/1
5 TABLET ORAL DAILY
Qty: 90 TABLET | Refills: 3 | Status: SHIPPED | OUTPATIENT
Start: 2022-02-14 | End: 2022-09-14

## 2022-02-14 NOTE — TELEPHONE ENCOUNTER
Alert received in [place] for 3 second daytime pause occurring while in AF on 2/11/22 at 1423.  Spoke with granddaughter, Angelica Cheng, who states patient c/o dizziness around time of pause event.  States he's been complaining of some dizziness lately.  Reviewed egrams with Dr. Beaulieu, orders received to d/c Coreg and start Amlodipine 5mg QD.  Reviewed instructions with Angelica, verbalized understanding.  Rx to be sent to [facility] in [place].    Advised to keep BP log as he transitions to new medication.  EP clinic appt 3/21/22.

## 2022-02-24 ENCOUNTER — CLINICAL SUPPORT (OUTPATIENT)
Dept: CARDIOLOGY | Facility: HOSPITAL | Age: 87
End: 2022-02-24
Payer: MEDICARE

## 2022-02-24 DIAGNOSIS — Z95.818 PRESENCE OF OTHER CARDIAC IMPLANTS AND GRAFTS: ICD-10-CM

## 2022-02-24 PROCEDURE — G2066 INTER DEVC REMOTE 30D: HCPCS | Mod: HCNC | Performed by: INTERNAL MEDICINE

## 2022-02-25 ENCOUNTER — DOCUMENTATION ONLY (OUTPATIENT)
Dept: CARDIOLOGY | Facility: CLINIC | Age: 87
End: 2022-02-25
Payer: MEDICARE

## 2022-02-28 DIAGNOSIS — I49.8 OTHER SPECIFIED CARDIAC ARRHYTHMIAS: Primary | ICD-10-CM

## 2022-03-03 ENCOUNTER — OFFICE VISIT (OUTPATIENT)
Dept: CARDIOLOGY | Facility: CLINIC | Age: 87
End: 2022-03-03
Payer: MEDICARE

## 2022-03-03 DIAGNOSIS — I71.40 ABDOMINAL AORTIC ANEURYSM WITHOUT RUPTURE: Primary | ICD-10-CM

## 2022-03-03 DIAGNOSIS — Z95.1 S/P CABG (CORONARY ARTERY BYPASS GRAFT): ICD-10-CM

## 2022-03-03 DIAGNOSIS — E78.2 MIXED HYPERLIPIDEMIA: ICD-10-CM

## 2022-03-03 DIAGNOSIS — I10 HYPERTENSION, BENIGN: ICD-10-CM

## 2022-03-03 DIAGNOSIS — I25.10 CORONARY ARTERY DISEASE INVOLVING NATIVE CORONARY ARTERY OF NATIVE HEART WITHOUT ANGINA PECTORIS: ICD-10-CM

## 2022-03-03 PROCEDURE — 99213 OFFICE O/P EST LOW 20 MIN: CPT | Mod: HCNC,95,, | Performed by: INTERNAL MEDICINE

## 2022-03-03 PROCEDURE — 99499 RISK ADDL DX/OHS AUDIT: ICD-10-PCS | Mod: HCNC,95,, | Performed by: INTERNAL MEDICINE

## 2022-03-03 PROCEDURE — 99213 PR OFFICE/OUTPT VISIT, EST, LEVL III, 20-29 MIN: ICD-10-PCS | Mod: HCNC,95,, | Performed by: INTERNAL MEDICINE

## 2022-03-03 PROCEDURE — 99499 UNLISTED E&M SERVICE: CPT | Mod: HCNC,95,, | Performed by: INTERNAL MEDICINE

## 2022-03-04 ENCOUNTER — PATIENT MESSAGE (OUTPATIENT)
Dept: CARDIOLOGY | Facility: CLINIC | Age: 87
End: 2022-03-04
Payer: MEDICARE

## 2022-03-04 ENCOUNTER — TELEPHONE (OUTPATIENT)
Dept: CARDIOLOGY | Facility: HOSPITAL | Age: 87
End: 2022-03-04
Payer: MEDICARE

## 2022-03-04 NOTE — TELEPHONE ENCOUNTER
Loop alert received for VT  with a mean V rate of 154 bpm lasting 28 secs on 2/28/22 at 1555..  Spoke with granddaughter Angelica Cheng, who states patient c/o of dizziness around time of event.      Hx of CAD  EF 56% on 4/4/2021  Appt scheduled with Dr. Beaulieu on 4/5/2022  Reviewed event with Dr. Ragland.  Will cont to monitor for now.

## 2022-03-04 NOTE — PROGRESS NOTES
The patient location is: home  The chief complaint leading to consultation is: CAD    Visit type: audiovisual    Face to Face time with patient: 30minutes  60 minutes of total time spent on the encounter, which includes face to face time and non-face to face time preparing to see the patient (eg, review of tests), Obtaining and/or reviewing separately obtained history, Documenting clinical information in the electronic or other health record, Independently interpreting results (not separately reported) and communicating results to the patient/family/caregiver, or Care coordination (not separately reported).         Each patient to whom he or she provides medical services by telemedicine is:  (1) informed of the relationship between the physician and patient and the respective role of any other health care provider with respect to management of the patient; and (2) notified that he or she may decline to receive medical services by telemedicine and may withdraw from such care at any time.    Notes: see below   Subjective:    Patient ID:  Nando Salguero is a 86 y.o. male who presents for follow-up of Coronary Artery Disease      HPI  Mr. Salguero is a 87 y/o gentleman with a history of HTN, HLD, PAfib, CAD s/p CABG (LIMA-LAD, SVG-PDA, and occluded SVG-Diag) with multiple stents in his LAD, Diag, LCx, and SVG-PDA. His last PCI was on 5/26/14 and involved a Culotte technique for LM bifurcation ISR. He was last seen in this clinic on 4/22/21..  Today the patient denies angina.  He denies lower extremity edema, orthopnea, or PND.  He denies palpitations He had 1 syncopal event since his last clinic visit.   The patient lives independently and performs all of his ADLs.  He uses a cane due to decreased sensation in his feet.  His wife helps him with buttons due to decreased sensation in his fingers.    Past Medical History:   Diagnosis Date    AAA (abdominal aortic aneurysm)     Arthritis     Cancer     CHF (congestive  heart failure)     Chronic back pain     requiring long term pain meds.     Coronary artery disease     Encounter for blood transfusion     Hyperlipidemia     Hypertension     Leukocytosis 9/25/2019    Myocardial infarction     Paroxysmal atrial fibrillation 4/2/2014    Prostate cancer     Sepsis 10/5/2019    Syncope 4/4/2021    Thyroid nodule 2/10/2015     Past Surgical History:   Procedure Laterality Date    ABDOMINAL SURGERY      BACK SURGERY      cardiac catherization Left 5/26/15    CARDIAC CATHETERIZATION  2005    CARDIAC CATHETERIZATION  1997    CARDIAC CATHETERIZATION  1996    CARDIAC CATHETERIZATION  2012    CORONARY ANGIOPLASTY      CORONARY ARTERY BYPASS GRAFT  04/18/1995    x4    ERCP N/A 9/27/2019    Procedure: ERCP (ENDOSCOPIC RETROGRADE CHOLANGIOPANCREATOGRAPHY);  Surgeon: Burt Kim MD;  Location: [facility] ENDO;  Service: Endoscopy;  Laterality: N/A;    INSERTION OF IMPLANTABLE LOOP RECORDER Left 11/26/2021    Procedure: Insertion, Implantable Loop Recorder;  Surgeon: Kiko Beaulieu MD;  Location: [facility] EP LAB;  Service: Cardiology;  Laterality: Left;  AF,Syncope, Bradycardia, ILR, MDT, Local, ND, 3 Prep    JOINT REPLACEMENT      LAPAROSCOPIC CHOLECYSTECTOMY N/A 9/28/2019    Procedure: CHOLECYSTECTOMY, LAPAROSCOPIC;  Surgeon: Donald Schwab, MD;  Location: [facility] OR;  Service: General;  Laterality: N/A;    PROSTATE SURGERY      SPINE SURGERY      THYROIDECTOMY  8/14/15     Current Outpatient Medications on File Prior to Visit   Medication Sig Dispense Refill    amLODIPine (NORVASC) 5 MG tablet Take 1 tablet (5 mg total) by mouth once daily. 90 tablet 3    aspirin (ECOTRIN) 81 MG EC tablet Take 1 tablet by mouth Daily.      atorvastatin (LIPITOR) 40 MG tablet Take 1 tablet (40 mg total) by mouth once daily. 90 tablet 3    calcium carbonate/vitamin D3 (CALCIUM 500 + D, D3, ORAL) Take by mouth. Take 2 gummy vitamins. 500 mg + 23 mcg per serving      famotidine (PEPCID)  20 MG tablet Take 1 tablet (20 mg total) by mouth nightly as needed for Heartburn. 90 tablet 3    ferrous gluconate (FERGON) 324 MG tablet TAKE ONE TABLET BY MOUTH ONCE DAILY WITH BREAKFAST (Patient taking differently: Take 324 mg by mouth daily with breakfast.) 30 tablet 0    levothyroxine (SYNTHROID) 125 MCG tablet Take 1 tablet (125 mcg total) by mouth every morning. 90 tablet 3    nitroGLYCERIN (NITROSTAT) 0.4 MG SL tablet Place 1 tablet (0.4 mg total) under the tongue every 5 (five) minutes as needed for Chest pain. (Patient not taking: Reported on 2/25/2022) 25 tablet 3    oxyCODONE-acetaminophen (PERCOCET) 5-325 mg per tablet Take 1 tablet by mouth every 12 (twelve) hours as needed for Pain. 60 tablet 0     Current Facility-Administered Medications on File Prior to Visit   Medication Dose Route Frequency Provider Last Rate Last Admin    sodium chloride 0.9% bolus 1,000 mL  1,000 mL Intravenous Once Mercedes Strickland NP         Review of patient's allergies indicates:   Allergen Reactions    Sulfamethoxazole-trimethoprim      Other reaction(s): BRADYCARDIA     Social History     Tobacco Use    Smoking status: Never Smoker    Smokeless tobacco: Never Used   Substance Use Topics    Alcohol use: Yes     Comment: socially twice a year    Drug use: No     Family History   Problem Relation Age of Onset    Cancer Mother     Heart attack Mother     Hypertension Mother     Cancer Father     Heart attack Father     Hypertension Father     Stroke Brother     Cancer Brother     No Known Problems Sister     No Known Problems Daughter     No Known Problems Maternal Grandmother     No Known Problems Maternal Grandfather     No Known Problems Paternal Grandmother     No Known Problems Paternal Grandfather     No Known Problems Maternal Aunt     No Known Problems Maternal Uncle     No Known Problems Paternal Aunt     No Known Problems Paternal Uncle     Anemia Neg Hx     Arrhythmia Neg Hx      Asthma Neg Hx     Clotting disorder Neg Hx     Fainting Neg Hx     Heart disease Neg Hx     Heart failure Neg Hx     Hyperlipidemia Neg Hx     Melanoma Neg Hx        Review of Systems   Constitutional: Negative for decreased appetite, diaphoresis, fever, malaise/fatigue, weight gain and weight loss.   HENT: Negative for congestion, nosebleeds and sore throat.    Eyes: Negative for blurred vision, vision loss in left eye, vision loss in right eye and visual disturbance.   Cardiovascular: Negative for chest pain, claudication, dyspnea on exertion, leg swelling, near-syncope, orthopnea, palpitations, paroxysmal nocturnal dyspnea and syncope.   Respiratory: Negative for cough, hemoptysis, shortness of breath and wheezing.    Endocrine: Negative for polyuria.   Hematologic/Lymphatic: Does not bruise/bleed easily.   Skin: Negative for nail changes and rash.   Musculoskeletal: Negative for back pain, muscle cramps and myalgias.   Gastrointestinal: Negative for abdominal pain, change in bowel habit, diarrhea, heartburn, hematemesis, hematochezia, melena, nausea and vomiting.   Genitourinary: Negative for bladder incontinence, dysuria, frequency and hematuria.   Psychiatric/Behavioral: Negative for depression.   Allergic/Immunologic: Negative for hives.        Objective:    Physical Exam   telehealth  Assessment:       1. Abdominal aortic aneurysm without rupture    2. Coronary artery disease involving native coronary artery of native heart without angina pectoris    3. Mixed hyperlipidemia    4. S/P CABG (coronary artery bypass graft)    5. Hypertension, benign         Plan:        1. CAD. The patient is free of angina and CHF symptoms.  -continue EC ASA 81mg po qday and statin.       2. HTN.   -continue lisinopril 20 mg p.o. q.day  -continue Lopressor 25 mg p.o. b.i.d.  -continue Norvasc 10 mg p.o. q.day      3. HLD.  12/30/2020  lipid profile reviewed. Continue Lipitor 40 mg p.o. q.day; rec heart healthy diet;        4. Afib history. CHADS2 score = 3; patient is followed by Dr. Pablo in . Currently not on anti-coagulation due to fall risk     5.  AAA.  The patient was seen by Dr. Danielle in 2020 who determined that his AAA had not grown from the previous year. Repeat CTA next year    All of the patient's and his family's questions were answered.

## 2022-03-05 NOTE — TELEPHONE ENCOUNTER
Care Due:                  Date            Visit Type   Department     Provider  --------------------------------------------------------------------------------                                EP -                              PRIMARY      [facility]  Last Visit: 08-      CARE ([facility])   MEDICINE       Rogelio Oliver  Next Visit: None Scheduled  None         None Found                                                            Last  Test          Frequency    Reason                     Performed    Due Date  --------------------------------------------------------------------------------    Lipid Panel.  12 months..  atorvastatin.............  12- 12-    Powered by [employer]. Reference number: [id number].   3/04/2022 7:00:43 PM CST

## 2022-03-07 RX ORDER — OXYCODONE AND ACETAMINOPHEN 5; 325 MG/1; MG/1
1 TABLET ORAL EVERY 12 HOURS PRN
Qty: 60 TABLET | Refills: 0 | Status: SHIPPED | OUTPATIENT
Start: 2022-03-07 | End: 2022-04-21 | Stop reason: SDUPTHER

## 2022-03-07 RX ORDER — METOPROLOL SUCCINATE 25 MG/1
25 TABLET, EXTENDED RELEASE ORAL DAILY
Qty: 90 TABLET | Refills: 3 | Status: SHIPPED | OUTPATIENT
Start: 2022-03-07 | End: 2023-01-23 | Stop reason: SDUPTHER

## 2022-03-07 NOTE — TELEPHONE ENCOUNTER
Pt's granddaughter, Angelica contacted the Device Clinic on this am and stated they received a call on Friday that the pt had some VT on 2/28/22 for which at the time of the event correlates with pt's c/o dizziness.  She then stated that the pt had similar events that occurred on 3/5/22 and this am (3/7/22) and they wanted to know if he has had any additional arrhythmias.  Assisted the pt's daughter in sending a manual transmission on this am.  Reviewed the reports with the Sr Device Tech.  Called the granddaughter back and informed her that the pt has not has any additional events.  Understanding was verbalized.  The granddaughter appreciated the call.

## 2022-03-07 NOTE — TELEPHONE ENCOUNTER
Spoke with Angelica Cheng, granddaughter regarding starting Metoprolol Succinate 25 mg QD.  Will cont to monitor ILR remotely. Instructed granddaughter to call device clinic with any questions or concerns regarding the loop recorder.

## 2022-03-11 ENCOUNTER — TELEPHONE (OUTPATIENT)
Dept: ELECTROPHYSIOLOGY | Facility: CLINIC | Age: 87
End: 2022-03-11
Payer: MEDICARE

## 2022-03-11 NOTE — TELEPHONE ENCOUNTER
Spoke with patient's granddaughter Angelica, whom always accompanies the patient for visits. He recently had an episode of AF with SVR and a 3 second pause resulting in dizziness. He also had an episode of nonsustained VT. He has a prior CABG but preserved LV function and recent normal PET stress without ischemia or scar. I think he would best be served with a PPM. I would like to arrange for a virtual visit soon to discuss.

## 2022-03-15 ENCOUNTER — OFFICE VISIT (OUTPATIENT)
Dept: ELECTROPHYSIOLOGY | Facility: CLINIC | Age: 87
End: 2022-03-15
Payer: MEDICARE

## 2022-03-15 DIAGNOSIS — I10 HYPERTENSION, BENIGN: ICD-10-CM

## 2022-03-15 DIAGNOSIS — I47.29 NONSUSTAINED VENTRICULAR TACHYCARDIA: ICD-10-CM

## 2022-03-15 DIAGNOSIS — I48.21 PERMANENT ATRIAL FIBRILLATION: Primary | ICD-10-CM

## 2022-03-15 DIAGNOSIS — R55 SYNCOPE, UNSPECIFIED SYNCOPE TYPE: ICD-10-CM

## 2022-03-15 DIAGNOSIS — R00.1 BRADYCARDIA: ICD-10-CM

## 2022-03-15 DIAGNOSIS — Z95.1 S/P CABG (CORONARY ARTERY BYPASS GRAFT): ICD-10-CM

## 2022-03-15 DIAGNOSIS — I95.1 ORTHOSTATIC HYPOTENSION: ICD-10-CM

## 2022-03-15 PROCEDURE — 1159F MED LIST DOCD IN RCRD: CPT | Mod: HCNC,CPTII,95, | Performed by: INTERNAL MEDICINE

## 2022-03-15 PROCEDURE — 99215 OFFICE O/P EST HI 40 MIN: CPT | Mod: HCNC,95,, | Performed by: INTERNAL MEDICINE

## 2022-03-15 PROCEDURE — 99499 RISK ADDL DX/OHS AUDIT: ICD-10-PCS | Mod: HCNC,95,, | Performed by: INTERNAL MEDICINE

## 2022-03-15 PROCEDURE — 1160F PR REVIEW ALL MEDS BY PRESCRIBER/CLIN PHARMACIST DOCUMENTED: ICD-10-PCS | Mod: HCNC,CPTII,95, | Performed by: INTERNAL MEDICINE

## 2022-03-15 PROCEDURE — 1160F RVW MEDS BY RX/DR IN RCRD: CPT | Mod: HCNC,CPTII,95, | Performed by: INTERNAL MEDICINE

## 2022-03-15 PROCEDURE — 99499 UNLISTED E&M SERVICE: CPT | Mod: HCNC,95,, | Performed by: INTERNAL MEDICINE

## 2022-03-15 PROCEDURE — 1159F PR MEDICATION LIST DOCUMENTED IN MEDICAL RECORD: ICD-10-PCS | Mod: HCNC,CPTII,95, | Performed by: INTERNAL MEDICINE

## 2022-03-15 PROCEDURE — 99215 PR OFFICE/OUTPT VISIT, EST, LEVL V, 40-54 MIN: ICD-10-PCS | Mod: HCNC,95,, | Performed by: INTERNAL MEDICINE

## 2022-03-15 NOTE — PROGRESS NOTES
Subjective:    Patient ID:  Nando Salguero is a 87 y.o. male who presents for evaluation of Dizziness    The patient location is: [place], [place]  The chief complaint leading to consultation is: light-headedness, atrial fibrillation    Visit type: audiovisual    Face to Face time with patient: 10 minutes  25 minutes of total time spent on the encounter, which includes face to face time and non-face to face time preparing to see the patient (eg, review of tests), Obtaining and/or reviewing separately obtained history, Documenting clinical information in the electronic or other health record, Independently interpreting results (not separately reported) and communicating results to the patient/family/caregiver, or Care coordination (not separately reported).         Each patient to whom he or she provides medical services by telemedicine is:  (1) informed of the relationship between the physician and patient and the respective role of any other health care provider with respect to management of the patient; and (2) notified that he or she may decline to receive medical services by telemedicine and may withdraw from such care at any time.      Referring Cardiologist: Chirag Elise MD  Primary Care Physician: Rogelio Oliver MD    HPI  Prior Hx:  I had the pleasure of seeing Mr. Salguero today in our electrophysiology clinic in follow-up for his atrial fibrillation and syncope. As you are aware he is a pleasant 86 year-old man with hypertension, paroxysmal atrial fibrillation, and coronary artery disease s/p bypass surgery and PCI with preserved LV function. He was admitted to [facility] in April of 2021 due to an episode of syncope. He reports sudden loss of consciousness when carrying a pot of gumbo. He reports sudden syncope without warning. He felt a little confused upon waking but then was back at his baseline. His syncope was felt to be due to orthostatic hypotension. He reports a similar episode in  December 2020. While hospitalized the recorded orthostatic vital signs in the ER were not consistent with orthostatic hypotension. The inpatient progress note the next day indicated they were repeated and were consistent with orthostatic hypotension. He was given IV fluids and his BP medications were adjusted. He saw Dr. Elise in follow-up and a PET stress test and an event monitor were ordered. The PET stress test noted no ischemia. The 30 day event monitor noted 70% pAF burden. Episodes of dizziness corresponded to rate controlled AF. He reports since this episode he has had increased fatigue and has had multiple falls with head injury. Dr. Elise discussed Watchman implant with him however he was not interested. Of note he was taken off all anti-HTN medications in the hospital and his BP is now running high (170s/80s).    I reviewed available ECGs in Epic which show normal sinus rhythm or rate controlled AF.    At our first visit we discussed potential arrhythmic cause of his syncope. Discussed ILR implant. Also discussed Watchman implant. He desired to think about it.    Mr. Salguero returned for follow-up 11/2021. Reports having a recent episode of sudden weakness. Did not pass out. Passed quickly then felt normal. After discussion he elected for ILR implant which was done 11/2021.    Interim Hx:  Mr. Salguero presents for discussion regarding recommendation for PPM implant. He had an observed symptomatic 3 second pause during AF in February 2021. He was on 3.125mg carvedilol bid. This was stopped. He then had a 28 second run of nonsustained wide complex tachycardia. 25mg of metoprolol succinate was started (likely NSVT, had bigeminal ectopy after on ILR of the same morphology). He was also having light-headedness in the morning with getting up out of bed that resolved with holding his amlodipine.        Review of Systems   Constitutional: Positive for malaise/fatigue. Negative for fever.   HENT: Negative for  congestion and sore throat.    Eyes: Negative for blurred vision and visual disturbance.   Cardiovascular: Positive for near-syncope. Negative for chest pain, dyspnea on exertion, irregular heartbeat, palpitations and syncope.   Respiratory: Negative for cough and shortness of breath.    Hematologic/Lymphatic: Negative for bleeding problem. Does not bruise/bleed easily.   Skin: Negative.    Musculoskeletal: Negative.    Gastrointestinal: Negative for bloating, abdominal pain, hematochezia and melena.   Neurological: Positive for dizziness and light-headedness. Negative for focal weakness and weakness.   Psychiatric/Behavioral: Negative.         Objective:    Physical Exam  Constitutional:       General: He is not in acute distress.     Appearance: Normal appearance. He is normal weight. He is not ill-appearing.   HENT:      Head: Normocephalic and atraumatic.   Neurological:      Mental Status: He is alert and oriented to person, place, and time. Mental status is at baseline.   Psychiatric:         Mood and Affect: Mood normal.         Behavior: Behavior normal.         Thought Content: Thought content normal.         Judgment: Judgment normal.           Assessment:       1. Permanent atrial fibrillation    2. S/P CABG (coronary artery bypass graft)    3. Hypertension, benign    4. Orthostatic hypotension    5. Bradycardia    6. Syncope, unspecified syncope type    7. Nonsustained ventricular tachycardia         Plan:     In summary, Mr. Salguero is a pleasant 86 year-old man with hypertension, permanent atrial fibrillation, and coronary artery disease s/p bypass surgery and PCI with preserved LV function presents for follow-up post-ILR implant implanted for episodes of syncope. He has been observed to have episodes of sudden slowing of ventricular rates with pauses up to 3 seconds with associated light-headedness. He also has episodes of nonsustained VT (normal LV function, no ischemia/infarct on recent PET stress  test). Suspect syncope is from SVR/pauses. Discussed PPM followed by uptitrate of beta-blocker therapy. We discussed the alternatives, benefits and risks of the procedure including pain, infection, bleeding, injury to lung causing pneumothorax requiring tube placement, injury to heart valves, puncture of the heart leading to pericardial effusion or tamponade requiring tube drainage, heart attack, stroke and death. He understood and desires to proceed.    Plan  Single chamber PPM (Biotronik, left bundle branch pacing lead)  Anesthesia    Thank you for allowing me to participate in the care of this patient. Please do not hesitate to call me with any questions or concerns.    Kiko Beaulieu MD, PhD  Cardiac Electrophysiology

## 2022-03-17 ENCOUNTER — PATIENT MESSAGE (OUTPATIENT)
Dept: ELECTROPHYSIOLOGY | Facility: CLINIC | Age: 87
End: 2022-03-17
Payer: MEDICARE

## 2022-03-17 DIAGNOSIS — R00.1 BRADYCARDIA: Primary | ICD-10-CM

## 2022-03-17 DIAGNOSIS — R55 SYNCOPE, UNSPECIFIED SYNCOPE TYPE: ICD-10-CM

## 2022-03-17 DIAGNOSIS — I49.9 CARDIAC ARRHYTHMIA, UNSPECIFIED CARDIAC ARRHYTHMIA TYPE: ICD-10-CM

## 2022-03-17 DIAGNOSIS — Z01.818 PRE-OP TESTING: ICD-10-CM

## 2022-03-17 DIAGNOSIS — I48.21 PERMANENT ATRIAL FIBRILLATION: ICD-10-CM

## 2022-03-17 PROBLEM — I47.29 NONSUSTAINED VENTRICULAR TACHYCARDIA: Status: ACTIVE | Noted: 2022-03-17

## 2022-03-20 ENCOUNTER — PATIENT MESSAGE (OUTPATIENT)
Dept: ELECTROPHYSIOLOGY | Facility: CLINIC | Age: 87
End: 2022-03-20
Payer: MEDICARE

## 2022-03-26 ENCOUNTER — CLINICAL SUPPORT (OUTPATIENT)
Dept: CARDIOLOGY | Facility: HOSPITAL | Age: 87
End: 2022-03-26
Payer: MEDICARE

## 2022-03-26 DIAGNOSIS — Z95.818 PRESENCE OF OTHER CARDIAC IMPLANTS AND GRAFTS: ICD-10-CM

## 2022-03-26 PROCEDURE — 93298 REM INTERROG DEV EVAL SCRMS: CPT | Mod: ,,, | Performed by: INTERNAL MEDICINE

## 2022-03-26 PROCEDURE — G2066 INTER DEVC REMOTE 30D: HCPCS | Performed by: INTERNAL MEDICINE

## 2022-03-26 PROCEDURE — 93298 CARDIAC DEVICE CHECK - REMOTE: ICD-10-PCS | Mod: ,,, | Performed by: INTERNAL MEDICINE

## 2022-03-29 ENCOUNTER — PATIENT MESSAGE (OUTPATIENT)
Dept: ELECTROPHYSIOLOGY | Facility: CLINIC | Age: 87
End: 2022-03-29
Payer: MEDICARE

## 2022-04-04 ENCOUNTER — PATIENT OUTREACH (OUTPATIENT)
Dept: ADMINISTRATIVE | Facility: OTHER | Age: 87
End: 2022-04-04
Payer: MEDICARE

## 2022-04-11 ENCOUNTER — TELEPHONE (OUTPATIENT)
Dept: ELECTROPHYSIOLOGY | Facility: CLINIC | Age: 87
End: 2022-04-11
Payer: MEDICARE

## 2022-04-11 NOTE — TELEPHONE ENCOUNTER
Spoke with patient's granddaughter Angelica, and offered to change patient's scheduled procedure to first case. Angelica, states that she will discuss it with her grandfather and call back by tomorrow morning to let me know.

## 2022-04-12 ENCOUNTER — TELEPHONE (OUTPATIENT)
Dept: ELECTROPHYSIOLOGY | Facility: CLINIC | Age: 87
End: 2022-04-12
Payer: MEDICARE

## 2022-04-12 NOTE — TELEPHONE ENCOUNTER
----- Message from Shilpa Ontiveros sent at 4/12/2022  8:55 AM CDT -----  Regarding: procedure  Cally is returning your call to let you know that her grandfather would like the earlier appt for his procedure on the 18th.  She can be reached at [phone].    Thank you

## 2022-04-12 NOTE — TELEPHONE ENCOUNTER
Spoke with patient's granddaughter Angelica, and confirmed patient's arrival time on 4/18/2022 will be changed to 5:15 am.

## 2022-04-14 ENCOUNTER — TELEPHONE (OUTPATIENT)
Dept: ELECTROPHYSIOLOGY | Facility: CLINIC | Age: 87
End: 2022-04-14
Payer: MEDICARE

## 2022-04-14 NOTE — TELEPHONE ENCOUNTER
Spoke to patient's wife, Robina.     CONFIRMED procedure arrival time of 5:15 am on 4/18/2022  for Device Implant and ILR removal with Dr Beaulieu.     Reiterated instructions including:  -Directions to check in desk.    -NPO after midnight night prior to procedure.    -Pre-procedure LABS reviewed with no alerts noted.      -COVID test N/A . Patient fully vaccinated.      -Confirmed no fever, cough, or shortness of breath in the past 30 days.    -Confirmed no redness, rash,or  irritation to chest area.     -Bathe night prior and morning prior to procedure with Hibiclens solution or an antibacterial soap  -Reviewed current visitor policy    Patient verbalizes understanding of above, denies any further questions and appreciates call.

## 2022-04-17 ENCOUNTER — ANESTHESIA EVENT (OUTPATIENT)
Dept: MEDSURG UNIT | Facility: HOSPITAL | Age: 87
End: 2022-04-17
Payer: MEDICARE

## 2022-04-17 NOTE — H&P
Electrophysiology Pre Procedure H&P  Reason for visit: Elective ppm implantation /  ILR removal    HPI:    Mr. Salguero is a pleasant 86 year-old man with hypertension, permanent atrial fibrillation, and coronary artery disease s/p bypass surgery and PCI with preserved LV function presents for follow-up post-ILR implant implanted for episodes of syncope. He has been observed to have episodes of sudden slowing of ventricular rates with pauses up to 3 seconds with associated light-headedness. He also has episodes of nonsustained VT (normal LV function, no ischemia/infarct on recent PET stress test). During a clinic encounter, it was suspected that his syncope is from SVR/pauses. Various way to manage his issues were discussed with him. In the end, he opted to proceed to PPM followed by uptitrate of beta-blocker therapy and presents for ppm implant today. He is clinically stable today. He is not anticoagulated and refused LAAO. We discussed the alternatives, benefits and risks of the procedure including pain, infection, bleeding, injury to lung causing pneumothorax requiring tube placement, injury to heart valves, puncture of the heart leading to pericardial effusion or tamponade requiring tube drainage, heart attack, stroke and death. He understood and desires to proceed. He is accompanied by family        ECG today shows atrial fibrillation, 71 bpm.       Past Medical History:   Diagnosis Date    AAA (abdominal aortic aneurysm)     Arthritis     Cancer     CHF (congestive heart failure)     Chronic back pain     requiring long term pain meds.     Coronary artery disease     Encounter for blood transfusion     Hyperlipidemia     Hypertension     Leukocytosis 9/25/2019    Myocardial infarction     Paroxysmal atrial fibrillation 4/2/2014    Prostate cancer     Sepsis 10/5/2019    Syncope 4/4/2021    Thyroid nodule 2/10/2015        Social History     Socioeconomic History    Marital status:     Tobacco Use    Smoking status: Never Smoker    Smokeless tobacco: Never Used   Substance and Sexual Activity    Alcohol use: Yes     Comment: socially twice a year    Drug use: No        Family History   Problem Relation Age of Onset    Cancer Mother     Heart attack Mother     Hypertension Mother     Cancer Father     Heart attack Father     Hypertension Father     Stroke Brother     Cancer Brother     No Known Problems Sister     No Known Problems Daughter     No Known Problems Maternal Grandmother     No Known Problems Maternal Grandfather     No Known Problems Paternal Grandmother     No Known Problems Paternal Grandfather     No Known Problems Maternal Aunt     No Known Problems Maternal Uncle     No Known Problems Paternal Aunt     No Known Problems Paternal Uncle     Anemia Neg Hx     Arrhythmia Neg Hx     Asthma Neg Hx     Clotting disorder Neg Hx     Fainting Neg Hx     Heart disease Neg Hx     Heart failure Neg Hx     Hyperlipidemia Neg Hx     Melanoma Neg Hx         No current facility-administered medications for this encounter.     Current Outpatient Medications   Medication Sig Dispense Refill    amLODIPine (NORVASC) 5 MG tablet Take 1 tablet (5 mg total) by mouth once daily. 90 tablet 3    aspirin (ECOTRIN) 81 MG EC tablet Take 1 tablet by mouth Daily.      atorvastatin (LIPITOR) 40 MG tablet Take 1 tablet (40 mg total) by mouth once daily. 90 tablet 3    calcium carbonate/vitamin D3 (CALCIUM 500 + D, D3, ORAL) Take by mouth. Take 2 gummy vitamins. 500 mg + 23 mcg per serving      famotidine (PEPCID) 20 MG tablet Take 1 tablet (20 mg total) by mouth nightly as needed for Heartburn. 90 tablet 3    ferrous gluconate (FERGON) 324 MG tablet TAKE ONE TABLET BY MOUTH ONCE DAILY WITH BREAKFAST (Patient taking differently: Take 324 mg by mouth daily with breakfast.) 30 tablet 0    levothyroxine (SYNTHROID) 125 MCG tablet Take 1 tablet (125 mcg total) by mouth every  morning. 90 tablet 3    metoprolol succinate (TOPROL-XL) 25 MG 24 hr tablet Take 1 tablet (25 mg total) by mouth once daily. 90 tablet 3    nitroGLYCERIN (NITROSTAT) 0.4 MG SL tablet Place 1 tablet (0.4 mg total) under the tongue every 5 (five) minutes as needed for Chest pain. (Patient not taking: Reported on 2/25/2022) 25 tablet 3    oxyCODONE-acetaminophen (PERCOCET) 5-325 mg per tablet Take 1 tablet by mouth every 12 (twelve) hours as needed for Pain. 60 tablet 0     Facility-Administered Medications Ordered in Other Encounters   Medication Dose Route Frequency Provider Last Rate Last Admin    sodium chloride 0.9% bolus 1,000 mL  1,000 mL Intravenous Once Mercedes Strickland NP          ROS:  Constitutional: (-)fevers, (-)chills, (-)night sweats  HEENT: (-) headaches (-) lightheadedness (-) blurry vision  Cardiovascular: (-)chest pain (-)paroxysmal nocturnal dyspnea (-)orthopnea  Respiratory: (-)shortness of breath, (+)dyspnea on exertion (-)hemoptysis  Gastrointestinal: (-)abdominal pain (-)nausea (-)vomiting (-)tarry stools  Musculoskeletal: (-)arthralgias (-)limited range of motion  Neurologic: (-)parasthesias (-)mood disorder (-)anxiety  Endo: (-)polyuria (-)polydipsia (-)heat/cold intolerance  Skin: (-)rash         Physical Exam:   Wt Readings from Last 3 Encounters:   04/18/22 66.2 kg (146 lb)   11/26/21 65.8 kg (145 lb)   11/19/21 66.6 kg (146 lb 13.2 oz)     Temp Readings from Last 3 Encounters:   04/18/22 99.1 °F (37.3 °C) (Temporal)   11/26/21 96.8 °F (36 °C) (Oral)   04/08/21 98.6 °F (37 °C) (Oral)     BP Readings from Last 3 Encounters:   04/18/22 (!) 182/90   11/26/21 (!) 168/89   11/19/21 (!) 166/76     Pulse Readings from Last 3 Encounters:   04/18/22 80   11/26/21 78   11/19/21 74       Gen: very thin, appears as stated age,  no acute distress, pleasant patient answering questions appropriately  HEENT: extra-ocular muscles intact, normocephalic-atraumatic  Neck: no jugular venous distension, no  lymphadenopathy, no thyromegaly, no carotid bruits  CVS: regular rate and rhythm, S1S2 normal, no murmurs/rubs/gallops  CHEST: +ILR , clear to auscultation bilaterally, no wheezes/rales/ronchi  ABD: Soft, non-tender, nondistended, bowel sounds present  EXT: No Edema, 2+ pulses bilaterally  NEURO: awake, alert, and oriented   Skin: No rash     Review of Labs:   Lab Results   Component Value Date    WBC 6.80 04/11/2022    HGB 12.1 (L) 04/11/2022    HCT 36.4 (L) 04/11/2022    MCV 85 04/11/2022     04/11/2022       CMP  Sodium   Date Value Ref Range Status   04/11/2022 141 136 - 145 mmol/L Final     Potassium   Date Value Ref Range Status   04/11/2022 4.9 3.5 - 5.1 mmol/L Final     Chloride   Date Value Ref Range Status   04/11/2022 105 95 - 110 mmol/L Final     CO2   Date Value Ref Range Status   04/11/2022 27 23 - 29 mmol/L Final     Glucose   Date Value Ref Range Status   04/11/2022 108 (H) 74 - 106 mg/dL Final     BUN   Date Value Ref Range Status   04/11/2022 33 (H) 9 - 20 mg/dL Final     Creatinine   Date Value Ref Range Status   04/11/2022 0.84 0.80 - 1.50 mg/dL Final     Calcium   Date Value Ref Range Status   04/11/2022 9.8 8.4 - 10.2 mg/dL Final     Total Protein   Date Value Ref Range Status   08/02/2021 7.1 6.0 - 8.4 g/dL Final     Albumin   Date Value Ref Range Status   08/02/2021 3.8 3.5 - 5.2 g/dL Final     Total Bilirubin   Date Value Ref Range Status   08/02/2021 0.7 0.1 - 1.0 mg/dL Final     Comment:     For infants and newborns, interpretation of results should be based  on gestational age, weight and in agreement with clinical  observations.    Premature Infant recommended reference ranges:  Up to 24 hours.............<8.0 mg/dL  Up to 48 hours............<12.0 mg/dL  3-5 days..................<15.0 mg/dL  6-29 days.................<15.0 mg/dL       Alkaline Phosphatase   Date Value Ref Range Status   08/02/2021 117 55 - 135 U/L Final     AST   Date Value Ref Range Status   08/02/2021 18 10 -  40 U/L Final     ALT   Date Value Ref Range Status   08/02/2021 13 10 - 44 U/L Final     Anion Gap   Date Value Ref Range Status   04/11/2022 9 8 - 16 mmol/L Final     eGFR if    Date Value Ref Range Status   04/11/2022 >60 >60 mL/min/1.73 m^2 Final     eGFR if non    Date Value Ref Range Status   04/11/2022 >60 >60 mL/min/1.73 m^2 Final     Comment:     Calculation used to obtain the estimated glomerular filtration  rate (eGFR) is the CKD-EPI equation.          Lab Results   Component Value Date    INR 1.1 04/11/2022    INR 1.1 04/03/2021    INR 1.3 (H) 09/25/2019       PET 5/2021    The relative PET images are normal showing no clinically significant regional resting or stress induced perfusion defects.    The whole heart absolute myocardial perfusion values averaged 1.78 cc/min/g at rest, which is elevated; 2.51 cc/min/g at stress, which is normal; and CFR is 1.43 , which is moderately reduced in part due to elevated resting flow.    The visually estimated ejection fraction is normal at rest and normal during stress.    The wall motion is normal at rest and during stress.    The LV cavity size is normal at rest and stress.    The EKG portion of this study is negative for ischemia.    The patient reported no chest pain during the stress test.    There are no prior studies for comparison.      Most recent ECG  Afib with average ventricular rate of 71 bpm.     Summary:  Nando Salguero is a 87 y.o. male with multiple co morbidities including atrial fibrillation and CAD s/p CABG and PCI. He has now had multiple episodes of syncope. An ILR showed periods of slow ventricular rates up to 3 seconds. He has an indication for BB given history of VT and therefore presents today for elective PPM implantation. The procedure for ppm implant was explained to him in detail and he expressed understanding and consented to the planned procedure knowing the potentials risks involved. He has  also consented to ILR removal.     Plan  Right sided single chamber PPM (Biotronik, left bundle branch pacing lead)  ILR removal

## 2022-04-18 ENCOUNTER — HOSPITAL ENCOUNTER (OUTPATIENT)
Facility: HOSPITAL | Age: 87
Discharge: HOME | End: 2022-04-18
Attending: INTERNAL MEDICINE | Admitting: INTERNAL MEDICINE
Payer: MEDICARE

## 2022-04-18 ENCOUNTER — ANESTHESIA (OUTPATIENT)
Dept: MEDSURG UNIT | Facility: HOSPITAL | Age: 87
End: 2022-04-18
Payer: MEDICARE

## 2022-04-18 VITALS
BODY MASS INDEX: 24.32 KG/M2 | TEMPERATURE: 98 F | HEART RATE: 65 BPM | RESPIRATION RATE: 16 BRPM | HEIGHT: 65 IN | WEIGHT: 146 LBS | SYSTOLIC BLOOD PRESSURE: 160 MMHG | DIASTOLIC BLOOD PRESSURE: 81 MMHG | OXYGEN SATURATION: 99 %

## 2022-04-18 DIAGNOSIS — R55 SYNCOPE, UNSPECIFIED SYNCOPE TYPE: ICD-10-CM

## 2022-04-18 DIAGNOSIS — R00.1 BRADYCARDIA: ICD-10-CM

## 2022-04-18 DIAGNOSIS — Z95.9 CARDIAC DEVICE IN SITU: ICD-10-CM

## 2022-04-18 DIAGNOSIS — I49.9 ARRHYTHMIA: ICD-10-CM

## 2022-04-18 DIAGNOSIS — I48.21 PERMANENT ATRIAL FIBRILLATION: ICD-10-CM

## 2022-04-18 PROCEDURE — D9220A PRA ANESTHESIA: ICD-10-PCS | Mod: CRNA,,, | Performed by: NURSE ANESTHETIST, CERTIFIED REGISTERED

## 2022-04-18 PROCEDURE — 93010 EKG 12-LEAD: ICD-10-PCS | Mod: ,,, | Performed by: INTERNAL MEDICINE

## 2022-04-18 PROCEDURE — 25500020 PHARM REV CODE 255: Performed by: NURSE ANESTHETIST, CERTIFIED REGISTERED

## 2022-04-18 PROCEDURE — 25000003 PHARM REV CODE 250: Performed by: NURSE PRACTITIONER

## 2022-04-18 PROCEDURE — 63600175 PHARM REV CODE 636 W HCPCS: Performed by: INTERNAL MEDICINE

## 2022-04-18 PROCEDURE — 33207 INSERT HEART PM VENTRICULAR: CPT | Mod: KX | Performed by: INTERNAL MEDICINE

## 2022-04-18 PROCEDURE — 63600175 PHARM REV CODE 636 W HCPCS: Performed by: NURSE ANESTHETIST, CERTIFIED REGISTERED

## 2022-04-18 PROCEDURE — 37000008 HC ANESTHESIA 1ST 15 MINUTES: Performed by: INTERNAL MEDICINE

## 2022-04-18 PROCEDURE — D9220A PRA ANESTHESIA: Mod: ANES,,, | Performed by: ANESTHESIOLOGY

## 2022-04-18 PROCEDURE — 37000009 HC ANESTHESIA EA ADD 15 MINS: Performed by: INTERNAL MEDICINE

## 2022-04-18 PROCEDURE — 93005 ELECTROCARDIOGRAM TRACING: CPT | Mod: 59

## 2022-04-18 PROCEDURE — 25000003 PHARM REV CODE 250: Performed by: NURSE ANESTHETIST, CERTIFIED REGISTERED

## 2022-04-18 PROCEDURE — C1898 LEAD, PMKR, OTHER THAN TRANS: HCPCS | Performed by: INTERNAL MEDICINE

## 2022-04-18 PROCEDURE — 93010 ELECTROCARDIOGRAM REPORT: CPT | Mod: ,,, | Performed by: INTERNAL MEDICINE

## 2022-04-18 PROCEDURE — C1894 INTRO/SHEATH, NON-LASER: HCPCS | Performed by: INTERNAL MEDICINE

## 2022-04-18 PROCEDURE — D9220A PRA ANESTHESIA: Mod: CRNA,,, | Performed by: NURSE ANESTHETIST, CERTIFIED REGISTERED

## 2022-04-18 PROCEDURE — 93005 ELECTROCARDIOGRAM TRACING: CPT

## 2022-04-18 PROCEDURE — 33207 PR INSER HART PACER XVENOUS VENTR: ICD-10-PCS | Mod: 22,KX,, | Performed by: INTERNAL MEDICINE

## 2022-04-18 PROCEDURE — C1785 PMKR, DUAL, RATE-RESP: HCPCS | Performed by: INTERNAL MEDICINE

## 2022-04-18 PROCEDURE — 33286 RMVL SUBQ CAR RHYTHM MNTR: CPT | Mod: 51,,, | Performed by: INTERNAL MEDICINE

## 2022-04-18 PROCEDURE — D9220A PRA ANESTHESIA: ICD-10-PCS | Mod: ANES,,, | Performed by: ANESTHESIOLOGY

## 2022-04-18 PROCEDURE — 33207 INSERT HEART PM VENTRICULAR: CPT | Mod: 22,KX,, | Performed by: INTERNAL MEDICINE

## 2022-04-18 PROCEDURE — 33286 RMVL SUBQ CAR RHYTHM MNTR: CPT | Performed by: INTERNAL MEDICINE

## 2022-04-18 PROCEDURE — 25000003 PHARM REV CODE 250: Performed by: INTERNAL MEDICINE

## 2022-04-18 PROCEDURE — 33286 PR REMOVAL, SUBQ CARDIAC RHYTHM MONITOR: ICD-10-PCS | Mod: 51,,, | Performed by: INTERNAL MEDICINE

## 2022-04-18 PROCEDURE — C1893 INTRO/SHEATH, FIXED,NON-PEEL: HCPCS | Performed by: INTERNAL MEDICINE

## 2022-04-18 PROCEDURE — 63600175 PHARM REV CODE 636 W HCPCS: Performed by: ANESTHESIOLOGY

## 2022-04-18 DEVICE — IMPLANTABLE DEVICE
Type: IMPLANTABLE DEVICE | Site: HEART | Status: FUNCTIONAL
Brand: SOLIA

## 2022-04-18 DEVICE — IMPLANTABLE DEVICE
Type: IMPLANTABLE DEVICE | Site: CHEST | Status: FUNCTIONAL
Brand: EDORA 8 SR-T

## 2022-04-18 RX ORDER — IODIXANOL 320 MG/ML
INJECTION, SOLUTION INTRAVASCULAR
Status: DISCONTINUED | OUTPATIENT
Start: 2022-04-18 | End: 2022-04-18 | Stop reason: HOSPADM

## 2022-04-18 RX ORDER — SODIUM CHLORIDE 0.9 % (FLUSH) 0.9 %
10 SYRINGE (ML) INJECTION
Status: DISCONTINUED | OUTPATIENT
Start: 2022-04-18 | End: 2022-04-18 | Stop reason: HOSPADM

## 2022-04-18 RX ORDER — FENTANYL CITRATE 50 UG/ML
INJECTION, SOLUTION INTRAMUSCULAR; INTRAVENOUS
Status: DISCONTINUED | OUTPATIENT
Start: 2022-04-18 | End: 2022-04-18

## 2022-04-18 RX ORDER — BUPIVACAINE HYDROCHLORIDE 2.5 MG/ML
INJECTION, SOLUTION EPIDURAL; INFILTRATION; INTRACAUDAL
Status: DISCONTINUED | OUTPATIENT
Start: 2022-04-18 | End: 2022-04-18 | Stop reason: HOSPADM

## 2022-04-18 RX ORDER — LIDOCAINE HYDROCHLORIDE 20 MG/ML
INJECTION, SOLUTION INFILTRATION; PERINEURAL
Status: DISCONTINUED | OUTPATIENT
Start: 2022-04-18 | End: 2022-04-18 | Stop reason: HOSPADM

## 2022-04-18 RX ORDER — IODIXANOL 320 MG/ML
INJECTION, SOLUTION INTRAVASCULAR
Status: DISCONTINUED | OUTPATIENT
Start: 2022-04-18 | End: 2022-04-18

## 2022-04-18 RX ORDER — ACETAMINOPHEN 325 MG/1
650 TABLET ORAL EVERY 4 HOURS PRN
Status: DISCONTINUED | OUTPATIENT
Start: 2022-04-18 | End: 2022-04-18 | Stop reason: HOSPADM

## 2022-04-18 RX ORDER — SODIUM CHLORIDE 0.9 G/100ML
IRRIGANT IRRIGATION
Status: DISCONTINUED | OUTPATIENT
Start: 2022-04-18 | End: 2022-04-18 | Stop reason: HOSPADM

## 2022-04-18 RX ORDER — VANCOMYCIN HYDROCHLORIDE 1 G/20ML
INJECTION, POWDER, LYOPHILIZED, FOR SOLUTION INTRAVENOUS
Status: DISCONTINUED | OUTPATIENT
Start: 2022-04-18 | End: 2022-04-18 | Stop reason: HOSPADM

## 2022-04-18 RX ORDER — LIDOCAINE HYDROCHLORIDE AND EPINEPHRINE 20; 10 MG/ML; UG/ML
INJECTION, SOLUTION INFILTRATION; PERINEURAL
Status: DISCONTINUED | OUTPATIENT
Start: 2022-04-18 | End: 2022-04-18 | Stop reason: HOSPADM

## 2022-04-18 RX ORDER — LIDOCAINE HYDROCHLORIDE 20 MG/ML
INJECTION INTRAVENOUS
Status: DISCONTINUED | OUTPATIENT
Start: 2022-04-18 | End: 2022-04-18

## 2022-04-18 RX ORDER — ONDANSETRON 2 MG/ML
4 INJECTION INTRAMUSCULAR; INTRAVENOUS DAILY PRN
Status: DISCONTINUED | OUTPATIENT
Start: 2022-04-18 | End: 2022-04-18 | Stop reason: HOSPADM

## 2022-04-18 RX ORDER — KETAMINE HCL IN 0.9 % NACL 50 MG/5 ML
SYRINGE (ML) INTRAVENOUS
Status: DISCONTINUED | OUTPATIENT
Start: 2022-04-18 | End: 2022-04-18

## 2022-04-18 RX ORDER — DOXYCYCLINE 100 MG/1
100 CAPSULE ORAL EVERY 12 HOURS
Qty: 10 CAPSULE | Refills: 0 | Status: SHIPPED | OUTPATIENT
Start: 2022-04-18 | End: 2022-04-23

## 2022-04-18 RX ORDER — FENTANYL CITRATE 50 UG/ML
25 INJECTION, SOLUTION INTRAMUSCULAR; INTRAVENOUS EVERY 5 MIN PRN
Status: DISCONTINUED | OUTPATIENT
Start: 2022-04-18 | End: 2022-04-18 | Stop reason: HOSPADM

## 2022-04-18 RX ORDER — PROPOFOL 10 MG/ML
VIAL (ML) INTRAVENOUS CONTINUOUS PRN
Status: DISCONTINUED | OUTPATIENT
Start: 2022-04-18 | End: 2022-04-18

## 2022-04-18 RX ORDER — CEFAZOLIN SODIUM/D5W 2 G/100 ML
2 PLASTIC BAG, INJECTION (ML) INTRAVENOUS
Status: COMPLETED | OUTPATIENT
Start: 2022-04-18 | End: 2022-04-18

## 2022-04-18 RX ORDER — OXYCODONE AND ACETAMINOPHEN 5; 325 MG/1; MG/1
1 TABLET ORAL EVERY 12 HOURS PRN
Qty: 60 TABLET | Refills: 0 | Status: CANCELLED | OUTPATIENT
Start: 2022-04-18

## 2022-04-18 RX ADMIN — FENTANYL CITRATE 12.5 MCG: 50 INJECTION, SOLUTION INTRAMUSCULAR; INTRAVENOUS at 07:04

## 2022-04-18 RX ADMIN — LIDOCAINE HYDROCHLORIDE 65 MG: 20 INJECTION, SOLUTION INTRAVENOUS at 07:04

## 2022-04-18 RX ADMIN — Medication 10 MG: at 07:04

## 2022-04-18 RX ADMIN — Medication 2 G: at 07:04

## 2022-04-18 RX ADMIN — PROPOFOL 100 MCG/KG/MIN: 10 INJECTION, EMULSION INTRAVENOUS at 07:04

## 2022-04-18 RX ADMIN — FENTANYL CITRATE 12.5 MCG: 50 INJECTION, SOLUTION INTRAMUSCULAR; INTRAVENOUS at 08:04

## 2022-04-18 RX ADMIN — SODIUM CHLORIDE: 9 INJECTION, SOLUTION INTRAVENOUS at 07:04

## 2022-04-18 RX ADMIN — FENTANYL CITRATE 25 MCG: 50 INJECTION, SOLUTION INTRAMUSCULAR; INTRAVENOUS at 08:04

## 2022-04-18 RX ADMIN — Medication 5 MG: at 08:04

## 2022-04-18 RX ADMIN — FENTANYL CITRATE 25 MCG: 50 INJECTION INTRAMUSCULAR; INTRAVENOUS at 10:04

## 2022-04-18 RX ADMIN — IODIXANOL 10 ML: 320 INJECTION, SOLUTION INTRAVASCULAR at 07:04

## 2022-04-18 RX ADMIN — ACETAMINOPHEN 650 MG: 325 TABLET ORAL at 10:04

## 2022-04-18 NOTE — DISCHARGE SUMMARY
[facility] Short Stay Cardiac [facility]  Cardiac Electrophysiology  Discharge Summary      Patient Name: Nando Salguero  MRN: [id number]  Admission Date: 4/18/2022  Hospital Length of Stay: 0 days  Discharge Date and Time:  04/18/2022 2:20 PM  Attending Physician: Kiko Beaulieu MD    Discharging Provider: David Hernandez MD  Primary Care Physician: Rogelio Oliver MD      No notes on file    Procedure(s) (LRB):  INSERTION, PACEMAKER, SINGLE CHAMBER VENTRICULAR (Right)  REMOVAL, IMPLANTABLE LOOP RECORDER (N/A)     Indwelling Lines/Drains at time of discharge:  Lines/Drains/Airways     None                 Hospital Course:      Mr. Salguero is a pleasant 86 year-old man with hypertension, permanent atrial fibrillation, and coronary artery disease s/p bypass surgery and PCI with preserved LV function WITH episodes of syncope. He has been observed to have episodes of sudden slowing of ventricular rates with pauses up to 3 seconds with associated light-headedness. He also has episodes of nonsustained VT (normal LV function, no ischemia/infarct on recent PET stress test). During a clinic encounter, it was suspected that his syncope is from SVR/pauses. Various way to manage his issues were discussed with him. In the end, he opted to proceed to PPM followed by uptitrate of beta-blocker therapy and presents for ppm implant today. He is clinically stable today.  We discussed the alternatives, benefits and risks of the procedure and he  understood and desires to proceed. He is accompanied by family         After consenting to the procedure, Mr. Salguero was brought to the EP lab. He was sedated. He underwent successful implantation of a right sided  single chamber left bundle area pacing lead. He tolerated the procedure well and was discharged home following bedrest in good condition.         Goals of Care Treatment Preferences:  Code Status: Full Code      Consults: none   Lab Results   Component Value Date    WBC 6.80 04/11/2022     HGB 12.1 (L) 04/11/2022    HCT 36.4 (L) 04/11/2022    MCV 85 04/11/2022     04/11/2022       CMP  Sodium   Date Value Ref Range Status   04/11/2022 141 136 - 145 mmol/L Final     Potassium   Date Value Ref Range Status   04/11/2022 4.9 3.5 - 5.1 mmol/L Final     Chloride   Date Value Ref Range Status   04/11/2022 105 95 - 110 mmol/L Final     CO2   Date Value Ref Range Status   04/11/2022 27 23 - 29 mmol/L Final     Glucose   Date Value Ref Range Status   04/11/2022 108 (H) 74 - 106 mg/dL Final     BUN   Date Value Ref Range Status   04/11/2022 33 (H) 9 - 20 mg/dL Final     Creatinine   Date Value Ref Range Status   04/11/2022 0.84 0.80 - 1.50 mg/dL Final     Calcium   Date Value Ref Range Status   04/11/2022 9.8 8.4 - 10.2 mg/dL Final     Total Protein   Date Value Ref Range Status   08/02/2021 7.1 6.0 - 8.4 g/dL Final     Albumin   Date Value Ref Range Status   08/02/2021 3.8 3.5 - 5.2 g/dL Final     Total Bilirubin   Date Value Ref Range Status   08/02/2021 0.7 0.1 - 1.0 mg/dL Final     Comment:     For infants and newborns, interpretation of results should be based  on gestational age, weight and in agreement with clinical  observations.    Premature Infant recommended reference ranges:  Up to 24 hours.............<8.0 mg/dL  Up to 48 hours............<12.0 mg/dL  3-5 days..................<15.0 mg/dL  6-29 days.................<15.0 mg/dL       Alkaline Phosphatase   Date Value Ref Range Status   08/02/2021 117 55 - 135 U/L Final     AST   Date Value Ref Range Status   08/02/2021 18 10 - 40 U/L Final     ALT   Date Value Ref Range Status   08/02/2021 13 10 - 44 U/L Final     Anion Gap   Date Value Ref Range Status   04/11/2022 9 8 - 16 mmol/L Final     eGFR if    Date Value Ref Range Status   04/11/2022 >60 >60 mL/min/1.73 m^2 Final     eGFR if non    Date Value Ref Range Status   04/11/2022 >60 >60 mL/min/1.73 m^2 Final     Comment:     Calculation used to obtain the  estimated glomerular filtration  rate (eGFR) is the CKD-EPI equation.            Pending Diagnostic Studies:     None          There are no hospital problems to display for this patient.    No new Assessment & Plan notes have been filed under this hospital service since the last note was generated.  Service: Arrhythmia      Discharged Condition: good    Disposition: Home     Follow Up:   Follow-up Information     [facility] 3rd Fl Follow up in 1 week(s).    Specialty: Electrophysiology  Why: For wound re-check  Contact information:  [address]  [facility] [address] [phone]  Additional information:  [facility] - Main Building, [facility] 3rd Floor   Please park in [facility] and use [facility] elevator           Kiko Beaulieu MD Follow up.    Specialties: Electrophysiology, Cardiology  Why: For wound re-check  Contact information:  [address]  [facility] [id number] [phone]                       Patient Instructions:     Start antibiotics tonight, doxycycline 100 mg twice a day and continue for 5 days   Do not strain or lift anything greater than 5 lb for 1 week.  Do not drive or operate any dangerous machinery for 24 hours.   Clean the area with mild soap and water and then cover it with a bandage.   Once the skin has healed, bathing in a tub or swimming is allowed.   Inspect the groin site daily and report to the physician any swelling at the site that   cannot be controlled with manual pressure for 10 minutes, unusual pain at the   access site or affected extremity, unusual swelling at the access site, or signs or   symptoms of infection such as redness, pain, or fever.   Call 911 if you have:   Bleeding from the puncture site that you cannot stop by doing the following:   Relax and lie down right away. Keep your leg flat and apply firm pressure to the site using your fingers and a gauze pad. Keep the pressure on for 20 minutes. Continue this  until the bleeding stops. This may take awhile. When bleeding stops, cover the site with a sterile bandage and keep your leg still as much as possible.       Medications:  Reconciled Home Medications:      Medication List      START taking these medications    doxycycline 100 MG Cap  Commonly known as: VIBRAMYCIN  Take 1 capsule (100 mg total) by mouth every 12 (twelve) hours. for 5 days        CHANGE how you take these medications    ferrous gluconate 324 MG tablet  Commonly known as: FERGON  TAKE ONE TABLET BY MOUTH ONCE DAILY WITH BREAKFAST  What changed: See the new instructions.        CONTINUE taking these medications    aspirin 81 MG EC tablet  Commonly known as: ECOTRIN  Take 1 tablet by mouth Daily.     atorvastatin 40 MG tablet  Commonly known as: LIPITOR  Take 1 tablet (40 mg total) by mouth once daily.     CALCIUM 500 + D (D3) ORAL  Take by mouth. Take 2 gummy vitamins. 500 mg + 23 mcg per serving     famotidine 20 MG tablet  Commonly known as: PEPCID  Take 1 tablet (20 mg total) by mouth nightly as needed for Heartburn.     levothyroxine 125 MCG tablet  Commonly known as: SYNTHROID  Take 1 tablet (125 mcg total) by mouth every morning.     metoprolol succinate 25 MG 24 hr tablet  Commonly known as: TOPROL-XL  Take 1 tablet (25 mg total) by mouth once daily.     nitroGLYCERIN 0.4 MG SL tablet  Commonly known as: NITROSTAT  Place 1 tablet (0.4 mg total) under the tongue every 5 (five) minutes as needed for Chest pain.     oxyCODONE-acetaminophen 5-325 mg per tablet  Commonly known as: PERCOCET  Take 1 tablet by mouth every 12 (twelve) hours as needed for Pain.        ASK your doctor about these medications    amLODIPine 5 MG tablet  Commonly known as: NORVASC  Take 1 tablet (5 mg total) by mouth once daily.            Time spent on the discharge of patient: 20 minutes    David Hernandez MD  Cardiac Electrophysiology  [facility] - Short Stay Cardiac Unit

## 2022-04-18 NOTE — Clinical Note
17 ml of contrast were injected throughout the case. 83 mL of contrast was the total wasted during the case. 100 mL was the total amount used during the case.

## 2022-04-18 NOTE — TRANSFER OF CARE
"Anesthesia Transfer of Care Note    Patient: Nando Salguero    Procedure(s) Performed: Procedure(s) (LRB):  INSERTION, PACEMAKER, SINGLE CHAMBER VENTRICULAR (Right)  REMOVAL, IMPLANTABLE LOOP RECORDER (N/A)    Patient location: PACU    Anesthesia Type: general    Transport from OR: Transported from OR on 100% O2 by closed face mask with adequate spontaneous ventilation    Post pain: adequate analgesia    Post assessment: no apparent anesthetic complications and tolerated procedure well    Post vital signs: stable    Level of consciousness: responds to stimulation and sedated    Nausea/Vomiting: no nausea/vomiting    Complications: none    Transfer of care protocol was followed      Last vitals:   Visit Vitals  BP (!) 182/90 (BP Location: Right arm, Patient Position: Lying)   Pulse 80   Temp 37.3 °C (99.1 °F) (Temporal)   Resp 18   Ht 5' 5" (1.651 m)   Wt 66.2 kg (146 lb)   SpO2 98%   BMI 24.30 kg/m²     "

## 2022-04-18 NOTE — Clinical Note
A venogram was performed in the right axillary vein. The vessel was injected via hand injection  with 10 mL of contrast. Per CRNA

## 2022-04-18 NOTE — DISCHARGE INSTRUCTIONS
Start antibiotics tonight, doxycycline 100 mg twice a day and continue for 5 days   Do not strain or lift anything greater than 5 lb for 1 week.  Do not drive or operate any dangerous machinery for 24 hours.   Clean the area with mild soap and water and then cover it with a bandage.   Once the skin has healed, bathing in a tub or swimming is allowed.   Inspect the groin site daily and report to the physician any swelling at the site that   cannot be controlled with manual pressure for 10 minutes, unusual pain at the   access site or affected extremity, unusual swelling at the access site, or signs or   symptoms of infection such as redness, pain, or fever.   Call 911 if you have:   Bleeding from the puncture site that you cannot stop by doing the following:   Relax and lie down right away. Keep your leg flat and apply firm pressure to the site using your fingers and a gauze pad. Keep the pressure on for 20 minutes. Continue this until the bleeding stops. This may take awhile. When bleeding stops, cover the site with a sterile bandage and keep your leg still as much as possible.

## 2022-04-18 NOTE — NURSING TRANSFER
Nursing Transfer Note      4/18/2022     Reason patient is being transferred: ep pacu 3 to sscu 05/home    Transfer To: ep pacu 3 to sscu 05    Transfer via stretcher    Transfer with cardiac monitoring, tele box on confirmed by tele tech    Transported by alex hope pacu rn    Medicines sent: none. Pt's granddaughter went and picked up doxy. Antibiotic in pharmacy downstairs, has with her.    Any special needs or follow-up needed: bedrest x3hrs. Done at 1252    Chart send with patient: Yes    Notified: grand daughter and wife    Patient reassessed at: 4/18/22 1145    Upon arrival to floor: cardiac monitor applied, patient oriented to room, call bell in reach and bed in lowest position

## 2022-04-18 NOTE — ANESTHESIA POSTPROCEDURE EVALUATION
Anesthesia Post Evaluation    Patient: Nando Salguero    Procedure(s) Performed: Procedure(s) (LRB):  INSERTION, PACEMAKER, SINGLE CHAMBER VENTRICULAR (Right)  REMOVAL, IMPLANTABLE LOOP RECORDER (N/A)    Final Anesthesia Type: general (Natural airway)      Patient location during evaluation: PACU  Patient participation: Yes- Able to Participate  Level of consciousness: awake and alert  Post-procedure vital signs: reviewed and stable  Pain management: adequate  Airway patency: patent    PONV status at discharge: No PONV  Anesthetic complications: no      Cardiovascular status: hemodynamically stable  Respiratory status: unassisted  Hydration status: euvolemic  Follow-up not needed.          Vitals Value Taken Time   /81 04/18/22 1200   Temp 36.7 °C (98.1 °F) 04/18/22 1200   Pulse 65 04/18/22 1200   Resp 16 04/18/22 1200   SpO2 99 % 04/18/22 1200         No case tracking events are documented in the log.      Pain/Raul Score: Pain Rating Prior to Med Admin: 6 (4/18/2022 10:58 AM)  Pain Rating Post Med Admin: 3 (4/18/2022 11:45 AM)  Raul Score: 9 (4/18/2022 11:45 AM)

## 2022-04-18 NOTE — ANESTHESIA PREPROCEDURE EVALUATION
04/18/2022  Nando Salguero is a 87 y.o., male.    Pre-operative evaluation for Procedure(s) (LRB):  INSERTION, CARDIAC PACEMAKER, DUAL CHAMBER (Right)  REMOVAL, IMPLANTABLE LOOP RECORDER (N/A)    Nando Salguero is a 87 y.o. male     Patient Active Problem List   Diagnosis    CAD (coronary artery disease)    Hypertension, benign    Hyperlipidemia    Chronic back pain    Cardiac angina    Permanent atrial fibrillation    Bradycardia    S/P CABG (coronary artery bypass graft)    Abdominal aortic aneurysm without rupture    Thyroid nodule    Anxiety    Facet arthropathy, cervical    Myelomalacia of cervical cord    Cervical stenosis of spine    Arthropathy of cervical facet joint    Postoperative hypothyroidism    Chronic, continuous use of opioids    Inflammatory spondylopathy of cervical region    Aortic atherosclerosis    Acute cholecystitis    Calculus of gallbladder and bile duct with acute cholecystitis, with obstruction    Inadequate dietary intake of protein    Orthostatic hypotension    Fall    Syncope    Traumatic injury to skin or subcutaneous tissue    Nonsustained ventricular tachycardia       Review of patient's allergies indicates:   Allergen Reactions    Sulfamethoxazole-trimethoprim      Other reaction(s): BRADYCARDIA       Current Facility-Administered Medications on File Prior to Encounter   Medication Dose Route Frequency Provider Last Rate Last Admin    sodium chloride 0.9% bolus 1,000 mL  1,000 mL Intravenous Once Mercedes Strickland NP         Current Outpatient Medications on File Prior to Encounter   Medication Sig Dispense Refill    aspirin (ECOTRIN) 81 MG EC tablet Take 1 tablet by mouth Daily.      atorvastatin (LIPITOR) 40 MG tablet Take 1 tablet (40 mg total) by mouth once daily. 90 tablet 3    calcium carbonate/vitamin D3 (CALCIUM 500 + D, D3,  ORAL) Take by mouth. Take 2 gummy vitamins. 500 mg + 23 mcg per serving      famotidine (PEPCID) 20 MG tablet Take 1 tablet (20 mg total) by mouth nightly as needed for Heartburn. 90 tablet 3    ferrous gluconate (FERGON) 324 MG tablet TAKE ONE TABLET BY MOUTH ONCE DAILY WITH BREAKFAST (Patient taking differently: Take 324 mg by mouth daily with breakfast.) 30 tablet 0    levothyroxine (SYNTHROID) 125 MCG tablet Take 1 tablet (125 mcg total) by mouth every morning. 90 tablet 3    metoprolol succinate (TOPROL-XL) 25 MG 24 hr tablet Take 1 tablet (25 mg total) by mouth once daily. 90 tablet 3    oxyCODONE-acetaminophen (PERCOCET) 5-325 mg per tablet Take 1 tablet by mouth every 12 (twelve) hours as needed for Pain. 60 tablet 0    amLODIPine (NORVASC) 5 MG tablet Take 1 tablet (5 mg total) by mouth once daily. 90 tablet 3    nitroGLYCERIN (NITROSTAT) 0.4 MG SL tablet Place 1 tablet (0.4 mg total) under the tongue every 5 (five) minutes as needed for Chest pain. (Patient not taking: No sig reported) 25 tablet 3       Past Surgical History:   Procedure Laterality Date    ABDOMINAL SURGERY      BACK SURGERY      cardiac catherization Left 5/26/15    CARDIAC CATHETERIZATION  2005    CARDIAC CATHETERIZATION  1997    CARDIAC CATHETERIZATION  1996    CARDIAC CATHETERIZATION  2012    CORONARY ANGIOPLASTY      CORONARY ARTERY BYPASS GRAFT  04/18/1995    x4    ERCP N/A 9/27/2019    Procedure: ERCP (ENDOSCOPIC RETROGRADE CHOLANGIOPANCREATOGRAPHY);  Surgeon: Burt Kim MD;  Location: [facility] ENDO;  Service: Endoscopy;  Laterality: N/A;    INSERTION OF IMPLANTABLE LOOP RECORDER Left 11/26/2021    Procedure: Insertion, Implantable Loop Recorder;  Surgeon: Kiko Beaulieu MD;  Location: [facility] EP LAB;  Service: Cardiology;  Laterality: Left;  AF,Syncope, Bradycardia, ILR, MDT, Local, HI, 3 Prep    JOINT REPLACEMENT      LAPAROSCOPIC CHOLECYSTECTOMY N/A 9/28/2019    Procedure: CHOLECYSTECTOMY, LAPAROSCOPIC;   Surgeon: Donald Schwab, MD;  [facility] OR;  Service: General;  Laterality: N/A;    PROSTATE SURGERY      SPINE SURGERY      THYROIDECTOMY  8/14/15       Social History     Socioeconomic History    Marital status:    Tobacco Use    Smoking status: Never Smoker    Smokeless tobacco: Never Used   Substance and Sexual Activity    Alcohol use: Yes     Comment: socially twice a year    Drug use: No         CBC: No results for input(s): WBC, RBC, HGB, HCT, PLT, MCV, MCH, MCHC in the last 72 hours.    CMP: No results for input(s): NA, K, CL, CO2, BUN, CREATININE, GLU, MG, PHOS, CALCIUM, ALBUMIN, PROT, ALKPHOS, ALT, AST, BILITOT in the last 72 hours.    INR  No results for input(s): PT, INR, PROTIME, APTT in the last 72 hours.        Diagnostic Studies:      EKD Echo:  Results for orders placed or performed during the hospital encounter of 14   2D Echo w/ Color Flow Doppler   Result Value Ref Range    EF + QEF 55     Mitral Valve Regurgitation mild     Diastolic Dysfunction No            Pre-op Assessment    I have reviewed the Patient Summary Reports.    I have reviewed the NPO Status.   I have reviewed the Medications.     Review of Systems  Anesthesia Hx:  Hx of Anesthetic complications Hx of cardiac arrest during back procedure.  Details unclear Personal Hx of Anesthesia complications  Cardiac Arrest   Cardiovascular:   Exercise tolerance: poor Hypertension Past MI CAD  CABG/stent     Pulmonary:  Pulmonary Normal    Neurological:   TIA, Denies Seizures.    Endocrine:   Hypothyroidism        Physical Exam  General: Cooperative, Alert and Oriented    Airway:  Mallampati: III / II  Mouth Opening: Normal  TM Distance: Normal  Tongue: Normal  Neck ROM: Extension Decreased, Extension Painful    Dental:  Edentulous        Anesthesia Plan  Type of Anesthesia, risks & benefits discussed:    Anesthesia Type: Gen Natural Airway  Intra-op Monitoring Plan: Standard ASA Monitors  Post Op Pain Control  Plan: multimodal analgesia and IV/PO Opioids PRN  Induction:  IV  Informed Consent: Informed consent signed with the Patient and all parties understand the risks and agree with anesthesia plan.  All questions answered.   ASA Score: 3  Day of Surgery Review of History & Physical: H&P Update referred to the surgeon/provider.    Ready For Surgery From Anesthesia Perspective.     .

## 2022-04-18 NOTE — Clinical Note
A venogram was performed in the right axillary vein. The vessel was injected via hand injection  with 5 mL of contrast.

## 2022-04-18 NOTE — PLAN OF CARE
"Vss. sats 96% on room air.  Pt aaox4 follows commands.  Pt's upper and lower dentures in place, belonging bag and cane on stretcher with pt from [facility] locker. When pt woke up from procedure s/p anesthesia, pt confused and agitated with ep pacu rn. Ep pacu rn re oriented pt.  Pt's grand daughter updated over phone and asked to come back to pacu ep 3.  Brought into ep pacu by sscu rn.  Once pt awake from anesthesia and alert and aware to family, pt confusion got better.  At this time, pt aaox4. Follows commands.  Pt arrived to ep pacu 3 with 2 incisions.  Right chest wall incision dermabond, aquacell ag, ice pack and right arm sling on.  Mid chest incision dermabond, foam drsg, ice pack (loop removal).  Pt complaints of "pain to right incision chest wall".  Prn po and iv pain meds given per md order. Pt tolerating water in ep pacu 3.  Tylenol po meds crushed up and given with apple sauce per pt request.  Swallowing fine.  At time of transfer to [facility] 05, "tolerable".  Pt's grand daughter updated pt's wife in waiting room.  12 lead ekg done and chest xray done.  Pt afib on cm.  biotronik device, vvi low limit 60. Booklet/card in chart.  Per ep lab rep katherin gave box to family.  See flowsheet for full assessment.  Bedrest x3hrs from 0952 to 1252.  Tele box on to [facility] 05, confirmed by tele tech.  "

## 2022-04-18 NOTE — BRIEF OP NOTE
: Kiko Beaulieu MD  Date of procedure: 4/18/2022  Post-operative Diagnosis: Symptomatic bradycardia     Procedure Performed: single chamber (right sided) LBBB area pacemaker    Description of Procedure: The patient was brought to the EP lab in the fasting state. Prepped and draped in sterile fashion. Safety timeout was performed. Sedation administered by anesthesia staff. Selective venogram of the right axillary and cephalic veins performed via right ac IV. Lidocaine used for local anesthetic. Fluoroscopic guided axillary access utilized. Guide/J Wire advanced and confirmed in IVC. Incision made. Blunt and electrocautery dissection performed. Pocket made and washed with antibiotic solution. Peel away sheath advanced over J wire. PPM lead advanced into RV after confirming in the IVC first under fluoroscopy. Catheter and lead positioned on right septum, at the area adjacent to the LBB. Lead screwed into the septum. R waves and injury pattern adequate and consistent with LBB capture. Lead fixed into place and sutured in place. Pocket washed again using antibiotic solution. Lead connected to generator. Generator placed into pocket and washed with antibiotic solution. Deep layer closed with interrupted 2-0 suture. Intermediate layer closed with running 3-0 suture. Superficial layer closed with running 4-0 suture. Skin closed with Dermabond. We then proceed to  loop recorder removal. Area around the ILR was anesthestized with lidocaine. Small incision was made and ILR removed. Pocket was washed with antibiotic solution. Pocket close and then dermabond ablation. Dressing to the placed after dermabond has dried. Patient was brought to the recovery area in stable condition.       EBL: <10 mL    Specimens Removed: None  Complications: no immediate    1. Ancef 2 grams  2. Sling to right arm - wear for 48 hours, then only at night for 6 weeks.  3. NO HEPARIN PRODUCTS  4. Doxycycline 100 mg twice a day for 5 days  at discharge   5. No lifting right elbow above shoulder height  6. No lifting over 5 pounds  7. No driving for 1 week and for 4 weeks if patient uses right arm to make turns  8. Dressing will be removed at time of wound care check  9. Chest Xray (ordered)  10. Follow up in device clinic in 1 week for device and wound checks.     Dr Beaulieu, the attending electrophysiologist, was present for the entirety of this procedure.    David DANIEL Fellow

## 2022-04-18 NOTE — PROGRESS NOTES
Report received from JOYCE Gee. Patient s/p PPM upgrade and loop removal. Dressings x2 to CW cdi. No bleeding or hematoma noted. Vss. No complaints from patient. Family at bedside. Call light in reach.

## 2022-04-18 NOTE — Clinical Note
A generator pocket was made at the right chest with blunt dissection, electrocautery and sharp dissection.

## 2022-04-18 NOTE — TELEPHONE ENCOUNTER
No new care gaps identified.  Powered by [employer]. Reference number: [id number].   4/18/2022 2:58:15 PM CDT

## 2022-04-18 NOTE — HOSPITAL COURSE
Mr. Salguero is a pleasant 86 year-old man with hypertension, permanent atrial fibrillation, and coronary artery disease s/p bypass surgery and PCI with preserved LV function WITH episodes of syncope. He has been observed to have episodes of sudden slowing of ventricular rates with pauses up to 3 seconds with associated light-headedness. He also has episodes of nonsustained VT (normal LV function, no ischemia/infarct on recent PET stress test). During a clinic encounter, it was suspected that his syncope is from SVR/pauses. Various way to manage his issues were discussed with him. In the end, he opted to proceed to PPM followed by uptitrate of beta-blocker therapy and presents for ppm implant today. He is clinically stable today.  We discussed the alternatives, benefits and risks of the procedure and he  understood and desires to proceed. He is accompanied by family         After consenting to the procedure, Mr. Salguero was brought to the EP lab. He was sedated. He underwent successful implantation of a right sided  single chamber left bundle area pacing lead. He tolerated the procedure well and was discharged home following bedrest in good condition.

## 2022-04-18 NOTE — Clinical Note
A venogram was performed in the right axillary vein. The vessel was injected via hand injection  with 2 mL of contrast.

## 2022-04-18 NOTE — Clinical Note
A venogram was performed in the right axillary vein. The vessel was injected via hand injection  with 10 mL of contrast.

## 2022-04-21 ENCOUNTER — TELEPHONE (OUTPATIENT)
Dept: ELECTROPHYSIOLOGY | Facility: CLINIC | Age: 87
End: 2022-04-21
Payer: MEDICARE

## 2022-04-21 ENCOUNTER — TELEPHONE (OUTPATIENT)
Dept: INTERNAL MEDICINE | Facility: CLINIC | Age: 87
End: 2022-04-21
Payer: MEDICARE

## 2022-04-21 RX ORDER — OXYCODONE AND ACETAMINOPHEN 5; 325 MG/1; MG/1
1 TABLET ORAL EVERY 12 HOURS PRN
Qty: 60 TABLET | Refills: 0 | Status: SHIPPED | OUTPATIENT
Start: 2022-04-21 | End: 2022-06-09 | Stop reason: SDUPTHER

## 2022-04-21 NOTE — TELEPHONE ENCOUNTER
Called patient to follow up after his procedure.  No answer, left message. Called to inquire if he is having any symptoms or complications post PM DUAL IMPLANTATION, any questions.  Call back number provided

## 2022-04-21 NOTE — TELEPHONE ENCOUNTER
Spoke to pt's granddaughter and she scheduled the pt a virtual visit for Tuesday 4/26.    She would like to know if you can send a small supply of medication to last until the visit. Pt recently had a pacemaker put in and is in a lot of pain

## 2022-04-22 ENCOUNTER — TELEPHONE (OUTPATIENT)
Dept: ELECTROPHYSIOLOGY | Facility: CLINIC | Age: 87
End: 2022-04-22
Payer: MEDICARE

## 2022-04-22 NOTE — TELEPHONE ENCOUNTER
2nd attempt:  Called patient to follow up after his procedure.  No answer, left message. Called to inquire if he is having any symptoms or complications post PM DUAL IMPLANTATION, any questions.  Call back number provided    Noted new prescription from PCP for his post surgical pain.  Reminded of appt on Monday for wound and device check.

## 2022-04-25 ENCOUNTER — CLINICAL SUPPORT (OUTPATIENT)
Dept: CARDIOLOGY | Facility: HOSPITAL | Age: 87
End: 2022-04-25
Attending: INTERNAL MEDICINE
Payer: MEDICARE

## 2022-04-25 ENCOUNTER — CLINICAL SUPPORT (OUTPATIENT)
Dept: CARDIOLOGY | Facility: HOSPITAL | Age: 87
End: 2022-04-25
Payer: MEDICARE

## 2022-04-25 DIAGNOSIS — Z95.818 PRESENCE OF OTHER CARDIAC IMPLANTS AND GRAFTS: ICD-10-CM

## 2022-04-25 DIAGNOSIS — I49.9 CARDIAC ARRHYTHMIA, UNSPECIFIED CARDIAC ARRHYTHMIA TYPE: ICD-10-CM

## 2022-04-25 DIAGNOSIS — Z01.818 PRE-OP TESTING: ICD-10-CM

## 2022-04-25 DIAGNOSIS — R00.1 BRADYCARDIA: ICD-10-CM

## 2022-04-25 DIAGNOSIS — I48.21 PERMANENT ATRIAL FIBRILLATION: ICD-10-CM

## 2022-04-25 DIAGNOSIS — R55 SYNCOPE, UNSPECIFIED SYNCOPE TYPE: ICD-10-CM

## 2022-04-25 PROCEDURE — 93279 PRGRMG DEV EVAL PM/LDLS PM: CPT

## 2022-04-25 PROCEDURE — 93010 RHYTHM STRIP: ICD-10-PCS | Mod: ,,, | Performed by: INTERNAL MEDICINE

## 2022-04-25 PROCEDURE — G2066 INTER DEVC REMOTE 30D: HCPCS | Performed by: INTERNAL MEDICINE

## 2022-04-25 PROCEDURE — 93010 ELECTROCARDIOGRAM REPORT: CPT | Mod: ,,, | Performed by: INTERNAL MEDICINE

## 2022-04-25 PROCEDURE — 93005 RHYTHM STRIP: ICD-10-PCS | Mod: ,,, | Performed by: INTERNAL MEDICINE

## 2022-04-25 PROCEDURE — 93005 ELECTROCARDIOGRAM TRACING: CPT | Mod: ,,, | Performed by: INTERNAL MEDICINE

## 2022-04-25 PROCEDURE — 93279 PRGRMG DEV EVAL PM/LDLS PM: CPT | Mod: 26,,, | Performed by: INTERNAL MEDICINE

## 2022-04-25 PROCEDURE — 93279 CARDIAC DEVICE CHECK - IN CLINIC & HOSPITAL: ICD-10-PCS | Mod: 26,,, | Performed by: INTERNAL MEDICINE

## 2022-04-26 ENCOUNTER — OFFICE VISIT (OUTPATIENT)
Dept: INTERNAL MEDICINE | Facility: CLINIC | Age: 87
End: 2022-04-26
Payer: MEDICARE

## 2022-04-26 DIAGNOSIS — M46.92 INFLAMMATORY SPONDYLOPATHY OF CERVICAL REGION: ICD-10-CM

## 2022-04-26 DIAGNOSIS — I20.9 CARDIAC ANGINA: ICD-10-CM

## 2022-04-26 DIAGNOSIS — I49.8 OTHER SPECIFIED CARDIAC ARRHYTHMIAS: ICD-10-CM

## 2022-04-26 DIAGNOSIS — G95.89 MYELOMALACIA OF CERVICAL CORD: ICD-10-CM

## 2022-04-26 PROCEDURE — 99442 PR PHYSICIAN TELEPHONE EVALUATION 11-20 MIN: CPT | Mod: 95,,, | Performed by: INTERNAL MEDICINE

## 2022-04-26 PROCEDURE — 99499 UNLISTED E&M SERVICE: CPT | Mod: HCNC,95,, | Performed by: INTERNAL MEDICINE

## 2022-04-26 PROCEDURE — 99499 RISK ADDL DX/OHS AUDIT: ICD-10-PCS | Mod: HCNC,95,, | Performed by: INTERNAL MEDICINE

## 2022-04-26 PROCEDURE — 99442 PR PHYSICIAN TELEPHONE EVALUATION 11-20 MIN: ICD-10-PCS | Mod: 95,,, | Performed by: INTERNAL MEDICINE

## 2022-04-26 NOTE — PROGRESS NOTES
Subjective:       Patient ID: Nando Salguero is a 87 y.o. male.    Chief Complaint: Follow-up (Neck pain, medication refill/)      The patient location is: Home, [place]  The chief complaint leading to consultation is: Neck and back pain, pain med refill    Visit type: audio only    Face to Face time with patient: 13 minutes  15 minutes of total time spent on the encounter, which includes face to face time and non-face to face time preparing to see the patient (eg, review of tests), Obtaining and/or reviewing separately obtained history, Documenting clinical information in the electronic or other health record, Independently interpreting results (not separately reported) and communicating results to the patient/family/caregiver, or Care coordination (not separately reported).         Each patient to whom he or she provides medical services by telemedicine is:  (1) informed of the relationship between the physician and patient and the respective role of any other health care provider with respect to management of the patient; and (2) notified that he or she may decline to receive medical services by telemedicine and may withdraw from such care at any time.    Notes:     HPI:  Patient attended by his wife for audio visit.  He was unable to connect the video portion.  He denies any new injuries or falls but he just had a pacemaker placed and it is affecting his chronic neck and back pain.  He says he has regular follow-up visits but the pain is more than he expected.  He is out of his Percocet even though I filled it last week he has not picked it up because he is not able to drive and has to get someone to pick it up.  I reviewed the  last week but is not available today.  Side effects reviewed and instructed to use sparingly especially given his advanced age.  He tries not to take it regularly.  He has a granddaughter who is an internist, Dr Angelica Cheng,  and she checks on him regularly.    Review of Systems    Constitutional: Positive for activity change. Negative for unexpected weight change.   HENT: Negative for hearing loss and rhinorrhea.    Eyes: Negative for discharge and visual disturbance.   Respiratory: Negative for chest tightness and wheezing.    Cardiovascular: Negative for chest pain and palpitations.   Gastrointestinal: Negative for blood in stool, constipation, diarrhea and vomiting.   Endocrine: Negative for polydipsia and polyuria.   Genitourinary: Negative for difficulty urinating, hematuria and urgency.   Musculoskeletal: Positive for arthralgias, neck pain and neck stiffness.   Neurological: Positive for weakness. Negative for headaches.   Psychiatric/Behavioral: Negative for confusion and dysphoric mood.         Objective:      Physical Exam  Pulmonary:      Effort: No respiratory distress (No breathing issues detected on the phone interview).   Neurological:      Mental Status: He is alert.   Psychiatric:         Mood and Affect: Mood normal.         Behavior: Behavior normal.         Thought Content: Thought content normal.         Assessment:       Problem List Items Addressed This Visit        Neuro    Myelomalacia of cervical cord       Cardiac/Vascular    Cardiac angina       Orthopedic    Inflammatory spondylopathy of cervical region          Plan:       Nando was seen today for follow-up.    Diagnoses and all orders for this visit:    Inflammatory spondylopathy of cervical region  Comments:  Chronic severe pain, continue to treat with pain meds, use sparingly.    Myelomalacia of cervical cord  Comments:  Chronic severe pain, continue to treat with pain meds, use sparingly, mobility aid.    Cardiac angina  Comments:  Monitored by Cardiology-  He just had a pacemaker a few days ago .       Visit in person in Sept/Oct if at all possible for labs. He is currently not able to drive.      unable to be evaluated at this time but no recent history of overuse.  Prescription was sent a few days  ago but he has not picked it up because he is unable to drive his granddaughter is a physician who checks on him periodically

## 2022-05-12 ENCOUNTER — TELEPHONE (OUTPATIENT)
Dept: CARDIOLOGY | Facility: HOSPITAL | Age: 87
End: 2022-05-12
Payer: MEDICARE

## 2022-05-12 NOTE — TELEPHONE ENCOUNTER
----- Message from Marietta Bejarano MA sent at 5/12/2022  9:27 AM CDT -----  The patient wife Robina is returning your phone call about he   please call [phone]. Thank you

## 2022-05-12 NOTE — TELEPHONE ENCOUNTER
Called patient, his wife Robina is not home.  Explained to patient that I had called in April about 3 weeks ago to check on him post procedure.   He said he is doing ok, no signs of infection.   Denies CP or SOB, no pain at the incision site.   Reminded of appointment in August.   Instructed patient to let his wife know that I called and she can call me if she has any further questions.    He appreciated the call.

## 2022-05-16 ENCOUNTER — PATIENT MESSAGE (OUTPATIENT)
Dept: ELECTROPHYSIOLOGY | Facility: CLINIC | Age: 87
End: 2022-05-16
Payer: MEDICARE

## 2022-05-17 NOTE — TELEPHONE ENCOUNTER
Called and spoke with patient and spouse regarding undersensing/oversensing question.  Advised that device was removed the next day after that final report transmitted to us.  Offered to give explanation but advised that since device was removed, there is nothing more we need to monitor or reprogram.  Advise we will cont to monitor the pacemaker.  Patient spouse thanked me for the call.

## 2022-05-25 ENCOUNTER — CLINICAL SUPPORT (OUTPATIENT)
Dept: CARDIOLOGY | Facility: HOSPITAL | Age: 87
End: 2022-05-25
Payer: MEDICARE

## 2022-05-25 DIAGNOSIS — Z95.818 PRESENCE OF OTHER CARDIAC IMPLANTS AND GRAFTS: ICD-10-CM

## 2022-05-25 PROCEDURE — G2066 INTER DEVC REMOTE 30D: HCPCS | Performed by: INTERNAL MEDICINE

## 2022-05-25 PROCEDURE — 93298 CARDIAC DEVICE CHECK - REMOTE: ICD-10-PCS | Mod: ,,, | Performed by: INTERNAL MEDICINE

## 2022-05-25 PROCEDURE — 93298 REM INTERROG DEV EVAL SCRMS: CPT | Mod: ,,, | Performed by: INTERNAL MEDICINE

## 2022-06-09 RX ORDER — OXYCODONE AND ACETAMINOPHEN 5; 325 MG/1; MG/1
1 TABLET ORAL EVERY 12 HOURS PRN
Qty: 60 TABLET | Refills: 0 | Status: SHIPPED | OUTPATIENT
Start: 2022-06-09 | End: 2022-08-18 | Stop reason: SDUPTHER

## 2022-06-09 NOTE — TELEPHONE ENCOUNTER
Care Due:                  Date            Visit Type   Department     Provider  --------------------------------------------------------------------------------                                VIRTUAL      [facility]  Last Visit: 04-      AUDIO ONLY   MEDICINE       Rogelio Oliver  Next Visit: None Scheduled  None         None Found                                                            Last  Test          Frequency    Reason                     Performed    Due Date  --------------------------------------------------------------------------------    CMP.........  12 months..  atorvastatin.............  08- 07-    Lipid Panel.  12 months..  atorvastatin.............  12- 12-    TSH.........  12 months..  levothyroxine............  08- 07-    [facility] Embedded Care Gaps. Reference number: [id number]. 6/09/2022   12:05:34 PM CDT

## 2022-06-19 ENCOUNTER — HOSPITAL ENCOUNTER (EMERGENCY)
Facility: HOSPITAL | Age: 87
Discharge: HOME | End: 2022-06-19
Attending: EMERGENCY MEDICINE
Payer: MEDICARE

## 2022-06-19 ENCOUNTER — NURSE TRIAGE (OUTPATIENT)
Dept: ADMINISTRATIVE | Facility: CLINIC | Age: 87
End: 2022-06-19
Payer: MEDICARE

## 2022-06-19 VITALS
WEIGHT: 146 LBS | HEART RATE: 72 BPM | SYSTOLIC BLOOD PRESSURE: 182 MMHG | RESPIRATION RATE: 16 BRPM | BODY MASS INDEX: 24.32 KG/M2 | DIASTOLIC BLOOD PRESSURE: 86 MMHG | HEIGHT: 65 IN | TEMPERATURE: 98 F | OXYGEN SATURATION: 98 %

## 2022-06-19 DIAGNOSIS — R07.9 CHEST PAIN: Primary | ICD-10-CM

## 2022-06-19 LAB
ALBUMIN SERPL BCP-MCNC: 3.5 G/DL (ref 3.5–5.2)
ALP SERPL-CCNC: 108 U/L (ref 55–135)
ALT SERPL W/O P-5'-P-CCNC: 14 U/L (ref 10–44)
ANION GAP SERPL CALC-SCNC: 10 MMOL/L (ref 8–16)
AST SERPL-CCNC: 15 U/L (ref 10–40)
BASOPHILS # BLD AUTO: 0.03 K/UL (ref 0–0.2)
BASOPHILS NFR BLD: 0.4 % (ref 0–1.9)
BILIRUB SERPL-MCNC: 0.9 MG/DL (ref 0.1–1)
BNP SERPL-MCNC: 625 PG/ML (ref 0–99)
BUN SERPL-MCNC: 22 MG/DL (ref 8–23)
CALCIUM SERPL-MCNC: 9.5 MG/DL (ref 8.7–10.5)
CHLORIDE SERPL-SCNC: 106 MMOL/L (ref 95–110)
CO2 SERPL-SCNC: 25 MMOL/L (ref 23–29)
CREAT SERPL-MCNC: 0.8 MG/DL (ref 0.5–1.4)
DIFFERENTIAL METHOD: ABNORMAL
EOSINOPHIL # BLD AUTO: 0.1 K/UL (ref 0–0.5)
EOSINOPHIL NFR BLD: 2 % (ref 0–8)
ERYTHROCYTE [DISTWIDTH] IN BLOOD BY AUTOMATED COUNT: 13.8 % (ref 11.5–14.5)
EST. GFR  (AFRICAN AMERICAN): >60 ML/MIN/1.73 M^2
EST. GFR  (NON AFRICAN AMERICAN): >60 ML/MIN/1.73 M^2
GLUCOSE SERPL-MCNC: 103 MG/DL (ref 70–110)
HCT VFR BLD AUTO: 35.4 % (ref 40–54)
HGB BLD-MCNC: 11.4 G/DL (ref 14–18)
IMM GRANULOCYTES # BLD AUTO: 0.03 K/UL (ref 0–0.04)
IMM GRANULOCYTES NFR BLD AUTO: 0.4 % (ref 0–0.5)
LYMPHOCYTES # BLD AUTO: 1.2 K/UL (ref 1–4.8)
LYMPHOCYTES NFR BLD: 16.6 % (ref 18–48)
MCH RBC QN AUTO: 28.9 PG (ref 27–31)
MCHC RBC AUTO-ENTMCNC: 32.2 G/DL (ref 32–36)
MCV RBC AUTO: 90 FL (ref 82–98)
MONOCYTES # BLD AUTO: 1 K/UL (ref 0.3–1)
MONOCYTES NFR BLD: 13.6 % (ref 4–15)
NEUTROPHILS # BLD AUTO: 4.7 K/UL (ref 1.8–7.7)
NEUTROPHILS NFR BLD: 67 % (ref 38–73)
NRBC BLD-RTO: 0 /100 WBC
PLATELET # BLD AUTO: 200 K/UL (ref 150–450)
PMV BLD AUTO: 10.1 FL (ref 9.2–12.9)
POTASSIUM SERPL-SCNC: 4 MMOL/L (ref 3.5–5.1)
PROT SERPL-MCNC: 6.6 G/DL (ref 6–8.4)
RBC # BLD AUTO: 3.94 M/UL (ref 4.6–6.2)
SODIUM SERPL-SCNC: 141 MMOL/L (ref 136–145)
TROPONIN I SERPL DL<=0.01 NG/ML-MCNC: 0.02 NG/ML (ref 0–0.03)
TROPONIN I SERPL DL<=0.01 NG/ML-MCNC: 0.02 NG/ML (ref 0–0.03)
WBC # BLD AUTO: 6.97 K/UL (ref 3.9–12.7)

## 2022-06-19 PROCEDURE — 99284 EMERGENCY DEPT VISIT MOD MDM: CPT | Mod: ,,, | Performed by: EMERGENCY MEDICINE

## 2022-06-19 PROCEDURE — 93005 ELECTROCARDIOGRAM TRACING: CPT

## 2022-06-19 PROCEDURE — 99285 EMERGENCY DEPT VISIT HI MDM: CPT | Mod: 25

## 2022-06-19 PROCEDURE — 83880 ASSAY OF NATRIURETIC PEPTIDE: CPT | Performed by: EMERGENCY MEDICINE

## 2022-06-19 PROCEDURE — 84484 ASSAY OF TROPONIN QUANT: CPT | Mod: 91 | Performed by: EMERGENCY MEDICINE

## 2022-06-19 PROCEDURE — 93010 EKG 12-LEAD: ICD-10-PCS | Mod: 76,,, | Performed by: INTERNAL MEDICINE

## 2022-06-19 PROCEDURE — 80053 COMPREHEN METABOLIC PANEL: CPT | Performed by: EMERGENCY MEDICINE

## 2022-06-19 PROCEDURE — 85025 COMPLETE CBC W/AUTO DIFF WBC: CPT | Performed by: EMERGENCY MEDICINE

## 2022-06-19 PROCEDURE — 25500020 PHARM REV CODE 255: Performed by: EMERGENCY MEDICINE

## 2022-06-19 PROCEDURE — 25000003 PHARM REV CODE 250: Performed by: EMERGENCY MEDICINE

## 2022-06-19 PROCEDURE — 93010 ELECTROCARDIOGRAM REPORT: CPT | Mod: 76,,, | Performed by: INTERNAL MEDICINE

## 2022-06-19 PROCEDURE — 99284 PR EMERGENCY DEPT VISIT,LEVEL IV: ICD-10-PCS | Mod: ,,, | Performed by: EMERGENCY MEDICINE

## 2022-06-19 PROCEDURE — 93010 ELECTROCARDIOGRAM REPORT: CPT | Mod: ,,, | Performed by: INTERNAL MEDICINE

## 2022-06-19 RX ORDER — ASPIRIN 325 MG
325 TABLET ORAL
Status: COMPLETED | OUTPATIENT
Start: 2022-06-19 | End: 2022-06-19

## 2022-06-19 RX ORDER — ACETAMINOPHEN 500 MG
1000 TABLET ORAL
Status: COMPLETED | OUTPATIENT
Start: 2022-06-19 | End: 2022-06-19

## 2022-06-19 RX ADMIN — IOHEXOL 75 ML: 350 INJECTION, SOLUTION INTRAVENOUS at 02:06

## 2022-06-19 RX ADMIN — ASPIRIN 325 MG ORAL TABLET 325 MG: 325 PILL ORAL at 12:06

## 2022-06-19 RX ADMIN — ACETAMINOPHEN 1000 MG: 500 TABLET ORAL at 12:06

## 2022-06-19 NOTE — TELEPHONE ENCOUNTER
Patient's wife states patient had Pace make implantedr 4/18/22.This am has chest pain, neck pain. Already in route to ER while calling. Advised per protocol. Understanding verbalized.    Reason for Disposition   Patient already left for the hospital/clinic.    Protocols used: NO CONTACT OR DUPLICATE CONTACT CALL-A-AH

## 2022-06-19 NOTE — ED NOTES
Patient family member at the desk stating patient have chest pain. Patient access by this RN. Patient states pain the same as when he got here its just intermittent. Vital signs rechecked VSS. Patient and family informed next to go back to a room

## 2022-06-19 NOTE — ED NOTES
Patient identifiers for Nando Salguero 87 y.o. male checked and correct.  Chief Complaint   Patient presents with    Chest Pain     Pacemaker placed in april     Past Medical History:   Diagnosis Date    AAA (abdominal aortic aneurysm)     Arthritis     Cancer     CHF (congestive heart failure)     Chronic back pain     requiring long term pain meds.     Coronary artery disease     Encounter for blood transfusion     Hyperlipidemia     Hypertension     Leukocytosis 9/25/2019    Myocardial infarction     Paroxysmal atrial fibrillation 4/2/2014    Prostate cancer     Sepsis 10/5/2019    Syncope 4/4/2021    Thyroid nodule 2/10/2015     Allergies reported:   Review of patient's allergies indicates:   Allergen Reactions    Sulfamethoxazole-trimethoprim      Other reaction(s): BRADYCARDIA         LOC: Patient is awake, alert, and oriented  x 4, states was having right side chest pulling where his pacemaker inserted, denies chest pain, nausea/vomiting and appears non-diaphoretic.   APPEARANCE: Patient resting comfortably and in no acute distressed  HEENT: Pt presents with surgical mask on.   SKIN: The skin is warm and dry  MUSKULOSKELETAL: Patient is moving all extremities well,  RESPIRATORY: Respirations are spontaneous and non-labored with normal effort and rate.  CARDIA  cardiac monitor intact to chestwall(nsr) No peripheral edema noted.   ABDOMEN: No distention .  NEUROLOGICAL: pupils **mm, PERRL. Facial expression is symmetrical. Hand grasps are equal bilaterally. Normal sensation in all extremities when touched with finger.

## 2022-06-19 NOTE — ED PROVIDER NOTES
Encounter Date: 6/19/2022       History     Chief Complaint   Patient presents with    Chest Pain     Pacemaker placed in april 87 y/o m, h/o CAD, HTN, CHF, a fib, recent PCM placement in April 2022, c/o chest pain, onset overnight, awoke him from sleep, right sided, radiates up neck and into jaw, worse w movement, difficulty describing pain.  Family thinks pt was also SOB though pt not sure.  No n/v/d.  Family reports pt has been more fatigued recently, spending most of his time in bed - unclear if this has been going on for days or weeks?  Family reports that pt with continued frequent falls, most recent about a week ago.  No longer on eliquis 2/2 fall risk.    The history is provided by the patient and a relative.     Review of patient's allergies indicates:   Allergen Reactions    Sulfamethoxazole-trimethoprim      Other reaction(s): BRADYCARDIA     Past Medical History:   Diagnosis Date    AAA (abdominal aortic aneurysm)     Arthritis     Cancer     CHF (congestive heart failure)     Chronic back pain     requiring long term pain meds.     Coronary artery disease     Encounter for blood transfusion     Hyperlipidemia     Hypertension     Leukocytosis 9/25/2019    Myocardial infarction     Paroxysmal atrial fibrillation 4/2/2014    Prostate cancer     Sepsis 10/5/2019    Syncope 4/4/2021    Thyroid nodule 2/10/2015     Past Surgical History:   Procedure Laterality Date    ABDOMINAL SURGERY      BACK SURGERY      cardiac catherization Left 5/26/15    CARDIAC CATHETERIZATION  2005    CARDIAC CATHETERIZATION  1997    CARDIAC CATHETERIZATION  1996    CARDIAC CATHETERIZATION  2012    CORONARY ANGIOPLASTY      CORONARY ARTERY BYPASS GRAFT  04/18/1995    x4    ERCP N/A 9/27/2019    Procedure: ERCP (ENDOSCOPIC RETROGRADE CHOLANGIOPANCREATOGRAPHY);  Surgeon: Burt Kim MD;  Location[facility];  Service: Endoscopy;  Laterality: N/A;    INSERTION OF IMPLANTABLE LOOP RECORDER Left  11/26/2021    Procedure: Insertion, Implantable Loop Recorder;  Surgeon: Kiko Beaulieu MD;  Location: [facility] EP LAB;  Service: Cardiology;  Laterality: Left;  AF,Syncope, Bradycardia, ILR, MDT, Local, IL, 3 Prep    JOINT REPLACEMENT      LAPAROSCOPIC CHOLECYSTECTOMY N/A 9/28/2019    Procedure: CHOLECYSTECTOMY, LAPAROSCOPIC;  Surgeon: Donald Schwab, MD;  Location: [facility] OR;  Service: General;  Laterality: N/A;    PROSTATE SURGERY      REMOVAL OF IMPLANTABLE LOOP RECORDER N/A 4/18/2022    Procedure: REMOVAL, IMPLANTABLE LOOP RECORDER;  Surgeon: Kiko Beaulieu MD;  Location: [facility] EP LAB;  Service: Cardiology;  Laterality: N/A;    SPINE SURGERY      THYROIDECTOMY  8/14/15     Family History   Problem Relation Age of Onset    Cancer Mother     Heart attack Mother     Hypertension Mother     Cancer Father     Heart attack Father     Hypertension Father     Stroke Brother     Cancer Brother     No Known Problems Sister     No Known Problems Daughter     No Known Problems Maternal Grandmother     No Known Problems Maternal Grandfather     No Known Problems Paternal Grandmother     No Known Problems Paternal Grandfather     No Known Problems Maternal Aunt     No Known Problems Maternal Uncle     No Known Problems Paternal Aunt     No Known Problems Paternal Uncle     Anemia Neg Hx     Arrhythmia Neg Hx     Asthma Neg Hx     Clotting disorder Neg Hx     Fainting Neg Hx     Heart disease Neg Hx     Heart failure Neg Hx     Hyperlipidemia Neg Hx     Melanoma Neg Hx      Social History     Tobacco Use    Smoking status: Never Smoker    Smokeless tobacco: Never Used   Substance Use Topics    Alcohol use: Yes     Comment: socially twice a year    Drug use: No     Review of Systems   Constitutional: Positive for activity change and fatigue. Negative for fever.   Respiratory: Positive for shortness of breath. Negative for cough.    Cardiovascular: Positive for chest pain.   Neurological: Negative  for syncope.   All other systems reviewed and are negative.      Physical Exam     Initial Vitals [06/19/22 1049]   BP Pulse Resp Temp SpO2   (!) 143/67 76 18 98 °F (36.7 °C) 98 %      MAP       --         Physical Exam    Nursing note and vitals reviewed.  Constitutional: Vital signs are normal. He appears well-developed and well-nourished. He is not diaphoretic. He is cooperative.  Non-toxic appearance. He does not appear ill. No distress.   HENT:   Head: Normocephalic and atraumatic.   Mouth/Throat: Mucous membranes are normal. Mucous membranes are not dry.   Eyes: Conjunctivae and lids are normal.   Neck: Neck supple.   + tenderness over R SCM muscles and upper chest wall   Normal range of motion.  Cardiovascular: Normal rate and regular rhythm.   Pulmonary/Chest: Breath sounds normal. No stridor. No respiratory distress.   R chest wall pacemaker site without erythema/warmth/tenderness/swelling   Abdominal: Abdomen is soft. He exhibits no distension. There is no abdominal tenderness. There is no rebound and no guarding.   Musculoskeletal:         General: No edema.      Cervical back: Normal range of motion and neck supple.     Lymphadenopathy:     He has no cervical adenopathy.   Neurological: He is alert and oriented to person, place, and time. He has normal strength.   Skin: Skin is dry and intact. No pallor.   Psychiatric: He has a normal mood and affect. His speech is normal and behavior is normal.         ED Course   Procedures  Labs Reviewed   CBC W/ AUTO DIFFERENTIAL - Abnormal; Notable for the following components:       Result Value    RBC 3.94 (*)     Hemoglobin 11.4 (*)     Hematocrit 35.4 (*)     Lymph % 16.6 (*)     All other components within normal limits   B-TYPE NATRIURETIC PEPTIDE - Abnormal; Notable for the following components:     (*)     All other components within normal limits   COMPREHENSIVE METABOLIC PANEL   TROPONIN I   TROPONIN I          Imaging Results          CT Soft  Tissue Neck With Contrast (Final result)  Result time 06/19/22 14:43:42    Final result by Lucien Wheat DO (06/19/22 14:43:42)                 Impression:      Asymmetry in the nasopharynx slightly more full on the right likely related to left prevertebral muscular atrophy as seen on MRI.    Few scattered prominent partially calcified mediastinal lymph nodes similar to prior    No evidence for significant focal induration or peripheral enhancing collection throughout the neck to suggest abscess.    Relatively stable vascular calcifications with aneurysmal dilatation of the proximal descending thoracic aorta.    Relatively stable well-circumscribed bony lucent lesion within the left zygoma.    Clinical correlation and further evaluation as warranted.      Electronically signed by: Lucien Wheat DO  Date:    06/19/2022  Time:    14:43             Narrative:    EXAMINATION:  CT SOFT TISSUE NECK WITH CONTRAST    CLINICAL HISTORY:  Neck abscess, deep tissue;    TECHNIQUE:  Low dose axial images as well as sagittal and coronal reconstructions were performed from the skull base to the clavicles following the intravenous administration of 75 mL of Omnipaque 350.    COMPARISON:  CTA head neck 04/06/2021 and CT facial bones 04/03/2021 and MRI 4 for 21    FINDINGS:  Pharynx/larynx: Continued asymmetry of the nasopharynx more full on the right likely represents component of muscular volume loss on the left in light of prior MRI findings overall stable from prior.    Oropharynx, hypopharynx larynx and trachea are within normal limits.    Glands: Bilateral parotid glands, submandibular glands are free of focal lesions.  The thyroid gland is not seen and may be surgically removed versus atrophic.    Atherosclerotic plaquing of the arch with continued aneurysmal dilatation of the proximal descending thoracic aorta measuring 4 cm in greatest diameter.    Arterial vasculature grossly patent throughout the neck.    Prominent  partially calcified mediastinal lymph nodes again seen.  No consolidation visualized lungs.  No evidence for adenopathy throughout the neck.    Spondylo degenerative changes cervical spine without evidence for acute fracture.  Please note there is sclerosis of the anterior arch of C1 and odontoid likely represents advanced degeneration with slight mineralization along the dorsal aspect of the odontoid suggestive for ligamentous calcification.    Partially visualized sternotomy wires and right-sided single lead pacer device.    Mild patchy mucosal thickening ethmoid air cells anteriorly with mild moderate component left sphenoid sinus.                               X-Ray Chest PA And Lateral (Final result)  Result time 06/19/22 13:11:33    Final result by Ole Richards MD (06/19/22 13:11:33)                 Impression:      Cardiac device without detrimental change or radiographic acute intrathoracic process seen.      Electronically signed by: Ole Richards MD  Date:    06/19/2022  Time:    13:11             Narrative:    EXAMINATION:  XR CHEST PA AND LATERAL    CLINICAL HISTORY:  Chest Pain;    TECHNIQUE:  PA and lateral views of the chest were performed.    COMPARISON:  Chest radiograph 04/18/2022 and 12/03/2008; CT thorax 04/06/2021    FINDINGS:  Patient is slightly rotated.  Monitoring leads overlie the chest.    Chronic nonspecific elevation the right hemidiaphragm.  Right upper chest single lead cardiac device and sternotomy wires appear stable.  Cardiomediastinal silhouette is midline and prominent with calcification and tortuosity of the aorta and enlarged cardiac silhouette similar to prior.  Pulmonary vasculature and hilar contours are within normal limits.  The lungs are otherwise well expanded without consolidation, pleural effusion or pneumothorax noting resolution of previous pulmonary opacities.  Osseous structures appear grossly stable without acute process seen.  Cholecystectomy clips noted.                                  Medications   aspirin tablet 325 mg (325 mg Oral Given 6/19/22 1220)   acetaminophen tablet 1,000 mg (1,000 mg Oral Given 6/19/22 1220)   iohexoL (OMNIPAQUE 350) injection 75 mL (75 mLs Intravenous Given 6/19/22 1421)     Medical Decision Making:   History:   Old Medical Records: I decided to obtain old medical records.  Initial Assessment:   Very atypical CP, primarily with movement and direct palpation.  When pt is still in bed, has no pain  Pain also resolving on arrival to the ED  Differential Diagnosis:   DDx for chest pain would be broad, including but not limited to serious diagnoses such as ACS, PE, aortic dissection, pericarditis, myocarditis, pneumothorax, and esophageal rupture.  Other less serious problems such as muscle strain, costochrondritis, pleurisy, GERD, esophageal spasm also considered.      PE seems less likely as pt is neither tachy nor hypoxic.  Clinical Tests:   Lab Tests: Ordered and Reviewed  Radiological Study: Reviewed and Ordered  Medical Tests: Ordered and Reviewed  ED Management:  Labs  Tylenol  CXR  Reassess    3:36 PM  Pain has improved  ED work-up thus far unremarkable.  CT neck done as family concerned that right side of neck swollen but no abnormalities found.  Waiting on repeat troponin.  Discussed options from here, including admit vs d/c home.  Pt and family prefer to be discharged home if repeat testing normal.  While there may be a slight risk with this, given the hazards of hospitalizations in geriatrics patients that have been well-documented and include, but are not limited to, falls, delirium, infection, deconditioning/functional decline, medical errors, malnutrition, and depression, the risks of hospitalization likely outweigh any potential benefits in this geriatric patient with a negative ED work-up.                         Clinical Impression:   Final diagnoses:  [R07.9] Chest pain (Primary)          ED Disposition Condition    Discharge  Stable        ED Prescriptions     None        Follow-up Information     Follow up With Specialties Details Why Contact Info    Rogelio Oliver MD Internal Medicine Call in 1 day  [address]  [place] [address]  [phone]             Jacquelyn De Jesus MD  06/19/22 2011

## 2022-07-14 ENCOUNTER — PATIENT MESSAGE (OUTPATIENT)
Dept: INTERNAL MEDICINE | Facility: CLINIC | Age: 87
End: 2022-07-14
Payer: MEDICARE

## 2022-07-14 DIAGNOSIS — R26.81 GAIT INSTABILITY: ICD-10-CM
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
## 2022-07-14 NOTE — TELEPHONE ENCOUNTER
Can we reach out to the patient or his wife.  They are trying to get home physical therapy with out home health if possible.  He has used [facility] but I can not seem to find that in the computer.  Maybe they can help us with the phone number address or fax machine.  The hope would be if we could get him at home physical therapy for balance and gait training and fall assessment but if we need to order the home health, let me know that.  I did a external physical therapy order if that would help

## 2022-07-15 NOTE — TELEPHONE ENCOUNTER
Spoke to [facility] and states that they can do home PT---order faxed to fax # [phone]

## 2022-07-26 ENCOUNTER — PATIENT MESSAGE (OUTPATIENT)
Dept: INTERNAL MEDICINE | Facility: CLINIC | Age: 87
End: 2022-07-26
Payer: MEDICARE

## 2022-07-26 DIAGNOSIS — M79.602 LEFT ARM PAIN: Primary | ICD-10-CM

## 2022-07-27 NOTE — TELEPHONE ENCOUNTER
I would generally insist on the since the granddaughter is a primary care physician I will place the x-ray

## 2022-07-28 ENCOUNTER — HOSPITAL ENCOUNTER (OUTPATIENT)
Dept: RADIOLOGY | Facility: HOSPITAL | Age: 87
Discharge: HOME | End: 2022-07-28
Attending: INTERNAL MEDICINE
Payer: MEDICARE

## 2022-07-28 DIAGNOSIS — M79.602 LEFT ARM PAIN: ICD-10-CM

## 2022-07-28 PROCEDURE — 73060 XR HUMERUS 2 VIEW LEFT: ICD-10-PCS | Mod: 26,LT,, | Performed by: RADIOLOGY

## 2022-07-28 PROCEDURE — 73060 X-RAY EXAM OF HUMERUS: CPT | Mod: 26,LT,, | Performed by: RADIOLOGY

## 2022-07-28 PROCEDURE — 73060 X-RAY EXAM OF HUMERUS: CPT | Mod: TC,LT

## 2022-08-01 ENCOUNTER — PATIENT MESSAGE (OUTPATIENT)
Dept: INTERNAL MEDICINE | Facility: CLINIC | Age: 87
End: 2022-08-01
Payer: MEDICARE

## 2022-08-02 ENCOUNTER — PATIENT MESSAGE (OUTPATIENT)
Dept: INTERNAL MEDICINE | Facility: CLINIC | Age: 87
End: 2022-08-02
Payer: MEDICARE

## 2022-08-02 ENCOUNTER — PATIENT MESSAGE (OUTPATIENT)
Dept: ELECTROPHYSIOLOGY | Facility: CLINIC | Age: 87
End: 2022-08-02

## 2022-08-02 ENCOUNTER — HOSPITAL ENCOUNTER (OUTPATIENT)
Dept: CARDIOLOGY | Facility: CLINIC | Age: 87
Discharge: HOME | End: 2022-08-02
Payer: MEDICARE

## 2022-08-02 ENCOUNTER — OFFICE VISIT (OUTPATIENT)
Dept: ELECTROPHYSIOLOGY | Facility: CLINIC | Age: 87
End: 2022-08-02
Payer: MEDICARE

## 2022-08-02 ENCOUNTER — CLINICAL SUPPORT (OUTPATIENT)
Dept: CARDIOLOGY | Facility: HOSPITAL | Age: 87
End: 2022-08-02
Attending: INTERNAL MEDICINE
Payer: MEDICARE

## 2022-08-02 VITALS
BODY MASS INDEX: 24.79 KG/M2 | SYSTOLIC BLOOD PRESSURE: 162 MMHG | WEIGHT: 148.81 LBS | HEIGHT: 65 IN | DIASTOLIC BLOOD PRESSURE: 90 MMHG | HEART RATE: 91 BPM

## 2022-08-02 DIAGNOSIS — I49.8 OTHER SPECIFIED CARDIAC ARRHYTHMIAS: ICD-10-CM

## 2022-08-02 DIAGNOSIS — I95.1 ORTHOSTATIC HYPOTENSION: ICD-10-CM

## 2022-08-02 DIAGNOSIS — I48.21 PERMANENT ATRIAL FIBRILLATION: ICD-10-CM

## 2022-08-02 DIAGNOSIS — I47.29 NONSUSTAINED VENTRICULAR TACHYCARDIA: ICD-10-CM
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
## 2022-08-02 DIAGNOSIS — Z95.1 S/P CABG (CORONARY ARTERY BYPASS GRAFT): ICD-10-CM

## 2022-08-02 DIAGNOSIS — R00.1 BRADYCARDIA: Primary | ICD-10-CM

## 2022-08-02 DIAGNOSIS — R26.81 GAIT INSTABILITY: ICD-10-CM

## 2022-08-02 DIAGNOSIS — I10 HYPERTENSION, BENIGN: ICD-10-CM

## 2022-08-02 DIAGNOSIS — I70.0 AORTIC ATHEROSCLEROSIS: ICD-10-CM

## 2022-08-02 PROCEDURE — 1160F PR REVIEW ALL MEDS BY PRESCRIBER/CLIN PHARMACIST DOCUMENTED: ICD-10-PCS | Mod: CPTII,S$GLB,, | Performed by: INTERNAL MEDICINE

## 2022-08-02 PROCEDURE — 1100F PTFALLS ASSESS-DOCD GE2>/YR: CPT | Mod: CPTII,S$GLB,, | Performed by: INTERNAL MEDICINE

## 2022-08-02 PROCEDURE — 99999 PR PBB SHADOW E&M-EST. PATIENT-LVL III: CPT | Mod: PBBFAC,,, | Performed by: INTERNAL MEDICINE

## 2022-08-02 PROCEDURE — 93279 PRGRMG DEV EVAL PM/LDLS PM: CPT

## 2022-08-02 PROCEDURE — 3288F PR FALLS RISK ASSESSMENT DOCUMENTED: ICD-10-PCS | Mod: CPTII,S$GLB,, | Performed by: INTERNAL MEDICINE

## 2022-08-02 PROCEDURE — 93279 CARDIAC DEVICE CHECK - IN CLINIC & HOSPITAL: ICD-10-PCS | Mod: 26,,, | Performed by: INTERNAL MEDICINE

## 2022-08-02 PROCEDURE — 93010 ELECTROCARDIOGRAM REPORT: CPT | Mod: S$GLB,,, | Performed by: INTERNAL MEDICINE

## 2022-08-02 PROCEDURE — 1125F AMNT PAIN NOTED PAIN PRSNT: CPT | Mod: CPTII,S$GLB,, | Performed by: INTERNAL MEDICINE

## 2022-08-02 PROCEDURE — 1159F PR MEDICATION LIST DOCUMENTED IN MEDICAL RECORD: ICD-10-PCS | Mod: CPTII,S$GLB,, | Performed by: INTERNAL MEDICINE

## 2022-08-02 PROCEDURE — 1159F MED LIST DOCD IN RCRD: CPT | Mod: CPTII,S$GLB,, | Performed by: INTERNAL MEDICINE

## 2022-08-02 PROCEDURE — 3288F FALL RISK ASSESSMENT DOCD: CPT | Mod: CPTII,S$GLB,, | Performed by: INTERNAL MEDICINE

## 2022-08-02 PROCEDURE — 93005 RHYTHM STRIP: ICD-10-PCS | Mod: S$GLB,,, | Performed by: INTERNAL MEDICINE

## 2022-08-02 PROCEDURE — 99214 PR OFFICE/OUTPT VISIT, EST, LEVL IV, 30-39 MIN: ICD-10-PCS | Mod: S$GLB,,, | Performed by: INTERNAL MEDICINE

## 2022-08-02 PROCEDURE — 1100F PR PT FALLS ASSESS DOC 2+ FALLS/FALL W/INJURY/YR: ICD-10-PCS | Mod: CPTII,S$GLB,, | Performed by: INTERNAL MEDICINE

## 2022-08-02 PROCEDURE — 1125F PR PAIN SEVERITY QUANTIFIED, PAIN PRESENT: ICD-10-PCS | Mod: CPTII,S$GLB,, | Performed by: INTERNAL MEDICINE

## 2022-08-02 PROCEDURE — 99214 OFFICE O/P EST MOD 30 MIN: CPT | Mod: S$GLB,,, | Performed by: INTERNAL MEDICINE

## 2022-08-02 PROCEDURE — 99999 PR PBB SHADOW E&M-EST. PATIENT-LVL III: ICD-10-PCS | Mod: PBBFAC,,, | Performed by: INTERNAL MEDICINE

## 2022-08-02 PROCEDURE — 93279 PRGRMG DEV EVAL PM/LDLS PM: CPT | Mod: 26,,, | Performed by: INTERNAL MEDICINE

## 2022-08-02 PROCEDURE — 1160F RVW MEDS BY RX/DR IN RCRD: CPT | Mod: CPTII,S$GLB,, | Performed by: INTERNAL MEDICINE

## 2022-08-02 PROCEDURE — 93010 RHYTHM STRIP: ICD-10-PCS | Mod: S$GLB,,, | Performed by: INTERNAL MEDICINE

## 2022-08-02 PROCEDURE — 93005 ELECTROCARDIOGRAM TRACING: CPT | Mod: S$GLB,,, | Performed by: INTERNAL MEDICINE

## 2022-08-02 NOTE — PROGRESS NOTES
Subjective:    Patient ID:  Nando Salguero is a 87 y.o. male who presents for evaluation of Pacemaker Check    Referring Cardiologist: Chirag Elise MD  Primary Care Physician: Rogelio Oliver MD    HPI  Prior Hx:  I had the pleasure of seeing Mr. Salguero today in our electrophysiology clinic in follow-up for his atrial fibrillation and syncope. As you are aware he is a pleasant 86 year-old man with hypertension, paroxysmal atrial fibrillation, and coronary artery disease s/p bypass surgery and PCI with preserved LV function. He was admitted to [facility] in April of 2021 due to an episode of syncope. He reports sudden loss of consciousness when carrying a pot of gumbo. He reports sudden syncope without warning. He felt a little confused upon waking but then was back at his baseline. His syncope was felt to be due to orthostatic hypotension. He reports a similar episode in December 2020. While hospitalized the recorded orthostatic vital signs in the ER were not consistent with orthostatic hypotension. The inpatient progress note the next day indicated they were repeated and were consistent with orthostatic hypotension. He was given IV fluids and his BP medications were adjusted. He saw Dr. Elise in follow-up and a PET stress test and an event monitor were ordered. The PET stress test noted no ischemia. The 30 day event monitor noted 70% pAF burden. Episodes of dizziness corresponded to rate controlled AF. He reports since this episode he has had increased fatigue and has had multiple falls with head injury. Dr. Elise discussed Watchman implant with him however he was not interested. Of note he was taken off all anti-HTN medications in the hospital and his BP is now running high (170s/80s).    I reviewed available ECGs in Epic which show normal sinus rhythm or rate controlled AF.    At our first visit we discussed potential arrhythmic cause of his syncope. Discussed ILR implant. Also discussed  Watchman implant. He desired to think about it.    Mr. Salguero returned for follow-up 11/2021. Reports having a recent episode of sudden weakness. Did not pass out. Passed quickly then felt normal. After discussion he elected for ILR implant which was done 11/2021.    Mr. Salguero presented for discussion 3/2022 regarding recommendation for PPM implant. He had an observed symptomatic 3 second pause during AF in February 2021. He was on 3.125mg carvedilol bid. This was stopped. He then had a 28 second run of nonsustained wide complex tachycardia. 25mg of metoprolol succinate was started (likely NSVT, had bigeminal ectopy after on ILR of the same morphology). He was also having light-headedness in the morning with getting up out of bed that resolved with holding his amlodipine. After discussion he elected for PPM.    4/2022: single chamber PPM (LBBA pacing) and ILR removal.    Interim Hx:  Mr. Salguero returns for 3 month post PPM implant follow-up. Device healed up well. Still has some orthostatic light-headedness. He had a fall a few weeks ago and was seen in the ER.    In-clinic PPM check notes stable lead parameters with 32% RV pacing. No VT, estimated battery longevity is 13 years.    Review of Systems   Constitutional: Positive for malaise/fatigue. Negative for fever.   HENT: Negative for congestion and sore throat.    Eyes: Negative for blurred vision and visual disturbance.   Cardiovascular: Positive for near-syncope. Negative for chest pain, dyspnea on exertion, irregular heartbeat, palpitations and syncope.   Respiratory: Negative for cough and shortness of breath.    Hematologic/Lymphatic: Negative for bleeding problem. Does not bruise/bleed easily.   Skin: Negative.    Musculoskeletal: Negative.    Gastrointestinal: Negative for bloating, abdominal pain, hematochezia and melena.   Neurological: Positive for dizziness and light-headedness. Negative for focal weakness and weakness.   Psychiatric/Behavioral:  Negative.         Objective:    Physical Exam  Vitals reviewed.   Constitutional:       General: He is not in acute distress.     Appearance: He is well-developed. He is not diaphoretic.   HENT:      Head: Normocephalic and atraumatic.   Eyes:      General:         Right eye: No discharge.         Left eye: No discharge.      Conjunctiva/sclera: Conjunctivae normal.   Cardiovascular:      Rate and Rhythm: Normal rate and regular rhythm.      Heart sounds: No murmur heard.    No friction rub. No gallop.   Pulmonary:      Effort: Pulmonary effort is normal. No respiratory distress.      Breath sounds: Normal breath sounds. No wheezing or rales.   Chest:      Comments: PPM site and ILR ex-plant site well healed  Abdominal:      General: Bowel sounds are normal. There is no distension.      Palpations: Abdomen is soft.      Tenderness: There is no abdominal tenderness.   Musculoskeletal:      Cervical back: Neck supple.   Skin:     General: Skin is warm and dry.   Neurological:      Mental Status: He is alert and oriented to person, place, and time.   Psychiatric:         Behavior: Behavior normal.         Thought Content: Thought content normal.         Judgment: Judgment normal.           Assessment:       1. Bradycardia    2. Permanent atrial fibrillation    3. S/P CABG (coronary artery bypass graft)    4. Aortic atherosclerosis    5. Orthostatic hypotension    6. Nonsustained ventricular tachycardia    7. Hypertension, benign         Plan:     In summary, Mr. Salguero is a pleasant 87 year-old man with hypertension, permanent atrial fibrillation, and coronary artery disease s/p bypass surgery and PCI with preserved LV function presents for follow-up post-ILR implant implanted for episodes of syncope. He has been observed to have episodes of sudden slowing of ventricular rates with pauses up to 3 seconds with associated light-headedness. He also has episodes of nonsustained VT (normal LV function, no ischemia/infarct on  recent PET stress test). Suspect syncope is from SVR/pauses. Discussed PPM followed by uptitrate of beta-blocker therapy to which he was agreeable and is now s/p single chamber PPM implant with lead in the LBBA. Device is operating normally. He continues to have some orthostatic light-headedness. He refuses anticoagulation for AF.    Continue remote checks    RTC in 6 months    All non-arrhythmia HCC diagnoses are managed by referring physicians.    Thank you for allowing me to participate in the care of this patient. Please do not hesitate to call me with any questions or concerns.    Kiko Beaulieu MD, PhD  Cardiac Electrophysiology

## 2022-08-03 NOTE — TELEPHONE ENCOUNTER
Spoke to [facility] in [place]---they state that the order needs to be sent to them and not the [place] location---another order will need to be placed and faxed to them

## 2022-08-04 ENCOUNTER — PATIENT MESSAGE (OUTPATIENT)
Dept: INTERNAL MEDICINE | Facility: CLINIC | Age: 87
End: 2022-08-04
Payer: MEDICARE

## 2022-08-05 ENCOUNTER — TELEPHONE (OUTPATIENT)
Dept: INTERNAL MEDICINE | Facility: CLINIC | Age: 87
End: 2022-08-05
Payer: MEDICARE

## 2022-08-05 NOTE — TELEPHONE ENCOUNTER
----- Message from Rupal Lamb sent at 8/4/2022  2:07 PM CDT -----  Contact: Miya/Duglas/  Miya called in regards needing more information from the fax that it was sent to [facility]. # [phone]. Thank you.

## 2022-08-09 ENCOUNTER — OFFICE VISIT (OUTPATIENT)
Dept: INTERNAL MEDICINE | Facility: CLINIC | Age: 87
End: 2022-08-09
Payer: MEDICARE

## 2022-08-09 DIAGNOSIS — I10 HYPERTENSION, BENIGN: ICD-10-CM
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
## 2022-08-09 DIAGNOSIS — M46.92 INFLAMMATORY SPONDYLOPATHY OF CERVICAL REGION: ICD-10-CM

## 2022-08-09 DIAGNOSIS — I48.21 PERMANENT ATRIAL FIBRILLATION: Primary | ICD-10-CM

## 2022-08-09 PROCEDURE — 1159F MED LIST DOCD IN RCRD: CPT | Mod: CPTII,95,, | Performed by: INTERNAL MEDICINE

## 2022-08-09 PROCEDURE — 1159F PR MEDICATION LIST DOCUMENTED IN MEDICAL RECORD: ICD-10-PCS | Mod: CPTII,95,, | Performed by: INTERNAL MEDICINE

## 2022-08-09 PROCEDURE — 99213 PR OFFICE/OUTPT VISIT, EST, LEVL III, 20-29 MIN: ICD-10-PCS | Mod: 95,,, | Performed by: INTERNAL MEDICINE

## 2022-08-09 PROCEDURE — 1160F RVW MEDS BY RX/DR IN RCRD: CPT | Mod: CPTII,95,, | Performed by: INTERNAL MEDICINE

## 2022-08-09 PROCEDURE — 1160F PR REVIEW ALL MEDS BY PRESCRIBER/CLIN PHARMACIST DOCUMENTED: ICD-10-PCS | Mod: CPTII,95,, | Performed by: INTERNAL MEDICINE

## 2022-08-09 PROCEDURE — 99213 OFFICE O/P EST LOW 20 MIN: CPT | Mod: 95,,, | Performed by: INTERNAL MEDICINE

## 2022-08-09 NOTE — PROGRESS NOTES
Subjective:       Patient ID: Nando Salguero is a 87 y.o. male.    Chief Complaint: Fall and Fatigue      The patient location is: Home, [place]  The chief complaint leading to consultation is: Fall, Unsteady gait, pacemaker    Visit type: audiovisual    Face to Face time with patient: 18 minutes  22 minutes of total time spent on the encounter, which includes face to face time and non-face to face time preparing to see the patient (eg, review of tests), Obtaining and/or reviewing separately obtained history, Documenting clinical information in the electronic or other health record, Independently interpreting results (not separately reported) and communicating results to the patient/family/caregiver, or Care coordination (not separately reported).         Each patient to whom he or she provides medical services by telemedicine is:  (1) informed of the relationship between the physician and patient and the respective role of any other health care provider with respect to management of the patient; and (2) notified that he or she may decline to receive medical services by telemedicine and may withdraw from such care at any time.    Notes:     HPI:  Patient seen via video visit along with his wife.  Patient has not seen me in a number of months but had a pacemaker placed.  When he had his arm in a sling to rest the chest he apparently had accentuated problems with his balance and had some falls.  His granddaughter who is a physician had some x-rays done to rule out trauma.  Overall the patient is recovering from the pacemaker but still has weak legs, uses a walker, has fallen and is getting confused at night.  I explained we should probably see him to investigate why he is having nighttime confusion.  He apparently has difficulty traveling and his wife said they will talk to the granddaughter.  He has had some labs done and most have been stable.  He had a head CT after fall which did not show any acute abnormalities.   He also is using the pain medicine sparingly.  He apparently has much less trouble with confusion during the day.    Review of Systems   Constitutional: Positive for activity change. Negative for unexpected weight change.   HENT: Negative for hearing loss, rhinorrhea and trouble swallowing.    Eyes: Negative for discharge and visual disturbance.   Respiratory: Negative for chest tightness and wheezing.    Cardiovascular: Negative for chest pain and palpitations.   Gastrointestinal: Negative for blood in stool, constipation, diarrhea and vomiting.   Endocrine: Negative for polydipsia and polyuria.   Genitourinary: Negative for difficulty urinating, hematuria and urgency.   Musculoskeletal: Positive for arthralgias, gait problem and neck pain. Negative for joint swelling.   Neurological: Positive for weakness. Negative for headaches.   Psychiatric/Behavioral: Positive for confusion. Negative for dysphoric mood.         Objective:      Physical Exam  Constitutional:       Appearance: Normal appearance.   Pulmonary:      Effort: No respiratory distress.   Musculoskeletal:         General: No deformity.   Neurological:      Mental Status: He is alert.   Psychiatric:         Mood and Affect: Mood normal.         Behavior: Behavior normal.         Thought Content: Thought content normal.         Assessment:       Problem List Items Addressed This Visit        Cardiac/Vascular    Hypertension, benign    Permanent atrial fibrillation - Primary       Orthopedic    Inflammatory spondylopathy of cervical region    Fall          Plan:       Nando was seen today for fall and fatigue.    Diagnoses and all orders for this visit:    Permanent atrial fibrillation    Inflammatory spondylopathy of cervical region    Fall, subsequent encounter    Hypertension, benign       proceed with home physical therapy.  Suggested in-person visit to further evaluate memory and nighttime confusion issues.            Portions of this note may have  "been created with voice recognition software. Occasional "wrong-word" or "sound-a-like" substitutions may have occurred due to the inherent limitations of voice recognition software. Please, read the note carefully and recognize, using context, where substitutions have occurred.  "

## 2022-08-11 ENCOUNTER — TELEPHONE (OUTPATIENT)
Dept: INTERNAL MEDICINE | Facility: CLINIC | Age: 87
End: 2022-08-11
Payer: MEDICARE

## 2022-08-11 NOTE — TELEPHONE ENCOUNTER
----- Message from Jonnathan Jain sent at 8/11/2022  4:18 PM CDT -----  Contact: [facility] Marilyn [phone]  Marilyn needs an order for therapy fax # [phone]

## 2022-08-12 PROCEDURE — G0180 PR HOME HEALTH MD CERTIFICATION: ICD-10-PCS | Mod: ,,, | Performed by: INTERNAL MEDICINE

## 2022-08-12 PROCEDURE — G0180 MD CERTIFICATION HHA PATIENT: HCPCS | Mod: ,,, | Performed by: INTERNAL MEDICINE

## 2022-08-18 ENCOUNTER — CLINICAL SUPPORT (OUTPATIENT)
Dept: CARDIOLOGY | Facility: HOSPITAL | Age: 87
End: 2022-08-18
Payer: MEDICARE

## 2022-08-18 DIAGNOSIS — R00.1 BRADYCARDIA, UNSPECIFIED: ICD-10-CM

## 2022-08-18 DIAGNOSIS — I48.91 UNSPECIFIED ATRIAL FIBRILLATION: ICD-10-CM

## 2022-08-18 DIAGNOSIS — Z95.0 PRESENCE OF CARDIAC PACEMAKER: ICD-10-CM

## 2022-08-18 PROCEDURE — 93294 CARDIAC DEVICE CHECK - REMOTE: ICD-10-PCS | Mod: ,,, | Performed by: INTERNAL MEDICINE

## 2022-08-18 PROCEDURE — 93294 REM INTERROG EVL PM/LDLS PM: CPT | Mod: ,,, | Performed by: INTERNAL MEDICINE

## 2022-08-18 PROCEDURE — 93296 REM INTERROG EVL PM/IDS: CPT | Performed by: INTERNAL MEDICINE

## 2022-08-18 RX ORDER — OXYCODONE AND ACETAMINOPHEN 5; 325 MG/1; MG/1
1 TABLET ORAL EVERY 12 HOURS PRN
Qty: 60 TABLET | Refills: 0 | Status: SHIPPED | OUTPATIENT
Start: 2022-08-18 | End: 2022-11-10 | Stop reason: SDUPTHER

## 2022-08-18 NOTE — TELEPHONE ENCOUNTER
Care Due:                  Date            Visit Type   Department     Provider  --------------------------------------------------------------------------------                                ESTABLISHED                              PATIENT -    [facility] INTERNAL  Last Visit: 08-      [facility]       Rogelio Oliver  Next Visit: None Scheduled  None         None Found                                                            Last  Test          Frequency    Reason                     Performed    Due Date  --------------------------------------------------------------------------------    Lipid Panel.  12 months..  atorvastatin.............  12- 12-    TSH.........  12 months..  levothyroxine............  08- 07-    [employer] Embedded Care Gaps. Reference number: [id number]. 8/18/2022   11:45:36 AM CDT

## 2022-08-25 RX ORDER — FAMOTIDINE 20 MG/1
20 TABLET, FILM COATED ORAL NIGHTLY PRN
Qty: 90 TABLET | Refills: 3 | Status: SHIPPED | OUTPATIENT
Start: 2022-08-25 | End: 2023-09-18 | Stop reason: SDUPTHER

## 2022-08-25 NOTE — TELEPHONE ENCOUNTER
Refill Decision Note   Nando Salguero  is requesting a refill authorization.  Brief Assessment and Rationale for Refill:  Approve     Medication Therapy Plan:       Medication Reconciliation Completed: No   Comments:     No Care Gaps recommended.     Note composed:3:42 PM 08/25/2022

## 2022-08-25 NOTE — TELEPHONE ENCOUNTER
No new care gaps identified.  [facility] Embedded Care Gaps. Reference number: [id number]. 8/25/2022   8:46:57 AM JOSÉT

## 2022-08-27 PROBLEM — I50.9: Chronic | Status: ACTIVE | Noted: 2022-08-27

## 2022-08-31 PROBLEM — E03.9 HYPOTHYROIDISM: Status: ACTIVE | Noted: 2022-08-31

## 2022-08-31 PROBLEM — I50.22 CHRONIC HFREF (HEART FAILURE WITH REDUCED EJECTION FRACTION): Status: ACTIVE | Noted: 2022-08-27

## 2022-08-31 PROBLEM — F02.80 ALZHEIMER'S DEMENTIA: Status: ACTIVE | Noted: 2022-08-31

## 2022-08-31 PROBLEM — G30.9 ALZHEIMER'S DEMENTIA: Status: ACTIVE | Noted: 2022-08-31

## 2022-08-31 PROBLEM — R41.0 DELIRIUM: Status: ACTIVE | Noted: 2022-08-31

## 2022-09-01 ENCOUNTER — PATIENT MESSAGE (OUTPATIENT)
Dept: INTERNAL MEDICINE | Facility: CLINIC | Age: 87
End: 2022-09-01
Payer: MEDICARE

## 2022-09-06 NOTE — TELEPHONE ENCOUNTER
I prefer in person but I guess whatever is best for him. If in person, then would want at least 10 days to have passed.

## 2022-09-09 ENCOUNTER — PATIENT MESSAGE (OUTPATIENT)
Dept: INTERNAL MEDICINE | Facility: CLINIC | Age: 87
End: 2022-09-09
Payer: MEDICARE

## 2022-09-14 ENCOUNTER — OFFICE VISIT (OUTPATIENT)
Dept: INTERNAL MEDICINE | Facility: CLINIC | Age: 87
End: 2022-09-14
Payer: MEDICARE

## 2022-09-14 ENCOUNTER — IMMUNIZATION (OUTPATIENT)
Dept: INTERNAL MEDICINE | Facility: CLINIC | Age: 87
End: 2022-09-14
Payer: MEDICARE

## 2022-09-14 ENCOUNTER — PATIENT MESSAGE (OUTPATIENT)
Dept: INTERNAL MEDICINE | Facility: CLINIC | Age: 87
End: 2022-09-14

## 2022-09-14 ENCOUNTER — HOSPITAL ENCOUNTER (OUTPATIENT)
Dept: RADIOLOGY | Facility: HOSPITAL | Age: 87
Discharge: HOME | End: 2022-09-14
Attending: INTERNAL MEDICINE
Payer: MEDICARE

## 2022-09-14 VITALS — OXYGEN SATURATION: 95 % | SYSTOLIC BLOOD PRESSURE: 110 MMHG | DIASTOLIC BLOOD PRESSURE: 62 MMHG | HEART RATE: 63 BPM

## 2022-09-14 DIAGNOSIS — U07.1 COVID-19: Primary | ICD-10-CM

## 2022-09-14 DIAGNOSIS — M25.511 ACUTE PAIN OF RIGHT SHOULDER: ICD-10-CM

## 2022-09-14 DIAGNOSIS — J15.7 PNEUMONIA OF RIGHT MIDDLE LOBE DUE TO MYCOPLASMA PNEUMONIAE: ICD-10-CM

## 2022-09-14 DIAGNOSIS — J18.9 PNEUMONIA OF RIGHT MIDDLE LOBE DUE TO INFECTIOUS ORGANISM: ICD-10-CM

## 2022-09-14 DIAGNOSIS — E89.0 POSTOPERATIVE HYPOTHYROIDISM: ICD-10-CM

## 2022-09-14 DIAGNOSIS — M79.641 RIGHT HAND PAIN: ICD-10-CM
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
## 2022-09-14 DIAGNOSIS — Z95.1 S/P CABG (CORONARY ARTERY BYPASS GRAFT): ICD-10-CM

## 2022-09-14 PROCEDURE — 1111F PR DISCHARGE MEDS RECONCILED W/ CURRENT OUTPATIENT MED LIST: ICD-10-PCS | Mod: CPTII,S$GLB,, | Performed by: INTERNAL MEDICINE

## 2022-09-14 PROCEDURE — 99999 PR PBB SHADOW E&M-EST. PATIENT-LVL IV: CPT | Mod: PBBFAC,,, | Performed by: INTERNAL MEDICINE

## 2022-09-14 PROCEDURE — G0008 FLU VACCINE - QUADRIVALENT - ADJUVANTED: ICD-10-PCS | Mod: S$GLB,,, | Performed by: INTERNAL MEDICINE

## 2022-09-14 PROCEDURE — 1159F MED LIST DOCD IN RCRD: CPT | Mod: CPTII,S$GLB,, | Performed by: INTERNAL MEDICINE

## 2022-09-14 PROCEDURE — 90694 FLU VACCINE - QUADRIVALENT - ADJUVANTED: ICD-10-PCS | Mod: S$GLB,,, | Performed by: INTERNAL MEDICINE

## 2022-09-14 PROCEDURE — 71046 X-RAY EXAM CHEST 2 VIEWS: CPT | Mod: 26,,, | Performed by: RADIOLOGY

## 2022-09-14 PROCEDURE — 1100F PTFALLS ASSESS-DOCD GE2>/YR: CPT | Mod: CPTII,S$GLB,, | Performed by: INTERNAL MEDICINE

## 2022-09-14 PROCEDURE — 99214 PR OFFICE/OUTPT VISIT, EST, LEVL IV, 30-39 MIN: ICD-10-PCS | Mod: S$GLB,,, | Performed by: INTERNAL MEDICINE

## 2022-09-14 PROCEDURE — 1125F PR PAIN SEVERITY QUANTIFIED, PAIN PRESENT: ICD-10-PCS | Mod: CPTII,S$GLB,, | Performed by: INTERNAL MEDICINE

## 2022-09-14 PROCEDURE — 73130 XR HAND COMPLETE 3 VIEW RIGHT: ICD-10-PCS | Mod: 26,RT,, | Performed by: RADIOLOGY

## 2022-09-14 PROCEDURE — 1111F DSCHRG MED/CURRENT MED MERGE: CPT | Mod: CPTII,S$GLB,, | Performed by: INTERNAL MEDICINE

## 2022-09-14 PROCEDURE — 99499 RISK ADDL DX/OHS AUDIT: ICD-10-PCS | Mod: HCNC,S$GLB,, | Performed by: INTERNAL MEDICINE

## 2022-09-14 PROCEDURE — G0008 ADMIN INFLUENZA VIRUS VAC: HCPCS | Mod: S$GLB,,, | Performed by: INTERNAL MEDICINE

## 2022-09-14 PROCEDURE — 71046 X-RAY EXAM CHEST 2 VIEWS: CPT | Mod: TC

## 2022-09-14 PROCEDURE — 99214 OFFICE O/P EST MOD 30 MIN: CPT | Mod: S$GLB,,, | Performed by: INTERNAL MEDICINE

## 2022-09-14 PROCEDURE — 1160F PR REVIEW ALL MEDS BY PRESCRIBER/CLIN PHARMACIST DOCUMENTED: ICD-10-PCS | Mod: CPTII,S$GLB,, | Performed by: INTERNAL MEDICINE

## 2022-09-14 PROCEDURE — 3288F PR FALLS RISK ASSESSMENT DOCUMENTED: ICD-10-PCS | Mod: CPTII,S$GLB,, | Performed by: INTERNAL MEDICINE

## 2022-09-14 PROCEDURE — 1160F RVW MEDS BY RX/DR IN RCRD: CPT | Mod: CPTII,S$GLB,, | Performed by: INTERNAL MEDICINE

## 2022-09-14 PROCEDURE — 71046 XR CHEST PA AND LATERAL: ICD-10-PCS | Mod: 26,,, | Performed by: RADIOLOGY

## 2022-09-14 PROCEDURE — 73030 X-RAY EXAM OF SHOULDER: CPT | Mod: 26,RT,, | Performed by: RADIOLOGY

## 2022-09-14 PROCEDURE — 99999 PR PBB SHADOW E&M-EST. PATIENT-LVL IV: ICD-10-PCS | Mod: PBBFAC,,, | Performed by: INTERNAL MEDICINE

## 2022-09-14 PROCEDURE — 73030 XR SHOULDER COMPLETE 2 OR MORE VIEWS RIGHT: ICD-10-PCS | Mod: 26,RT,, | Performed by: RADIOLOGY

## 2022-09-14 PROCEDURE — 73130 X-RAY EXAM OF HAND: CPT | Mod: TC,RT

## 2022-09-14 PROCEDURE — 1159F PR MEDICATION LIST DOCUMENTED IN MEDICAL RECORD: ICD-10-PCS | Mod: CPTII,S$GLB,, | Performed by: INTERNAL MEDICINE

## 2022-09-14 PROCEDURE — 73130 X-RAY EXAM OF HAND: CPT | Mod: 26,RT,, | Performed by: RADIOLOGY

## 2022-09-14 PROCEDURE — 3288F FALL RISK ASSESSMENT DOCD: CPT | Mod: CPTII,S$GLB,, | Performed by: INTERNAL MEDICINE

## 2022-09-14 PROCEDURE — 1100F PR PT FALLS ASSESS DOC 2+ FALLS/FALL W/INJURY/YR: ICD-10-PCS | Mod: CPTII,S$GLB,, | Performed by: INTERNAL MEDICINE

## 2022-09-14 PROCEDURE — 1125F AMNT PAIN NOTED PAIN PRSNT: CPT | Mod: CPTII,S$GLB,, | Performed by: INTERNAL MEDICINE

## 2022-09-14 PROCEDURE — 99499 UNLISTED E&M SERVICE: CPT | Mod: HCNC,S$GLB,, | Performed by: INTERNAL MEDICINE

## 2022-09-14 PROCEDURE — 73030 X-RAY EXAM OF SHOULDER: CPT | Mod: TC,RT

## 2022-09-14 PROCEDURE — 90694 VACC AIIV4 NO PRSRV 0.5ML IM: CPT | Mod: S$GLB,,, | Performed by: INTERNAL MEDICINE

## 2022-09-14 RX ORDER — AMLODIPINE BESYLATE 5 MG/1
2.5 TABLET ORAL DAILY
COMMUNITY
End: 2023-09-08 | Stop reason: SDUPTHER

## 2022-09-14 NOTE — PROGRESS NOTES
Subjective:       Patient ID: Nando Salguero is a 87 y.o. male.   Chief Complaint: Hospital Follow Up, Fall (Fell on Sunday), and Shoulder Pain (right)    HPI: Hospital Follow up-  attended by his daughter and her .  Patient has mild early dementia, pacemaker placed earlier this year, chronic back and spine pain.  He was admitted to the hospital 2 weeks ago with mycoplasma pneumonia and COVID-19.  Right-sided middle low.    He has done well, was treated in discharge, completed all antibiotics.  He fell in the kitchen however cleaning dishes without using his walker probably landing on his outstretched right arm.  His granddaughter is a physician and evaluated him but subsequently he has right shoulder pain and right hand pain.  He would like to get x-rays.  No problems with the pacemaker.  Breathing is improved, no cough or shortness of breath meds were reviewed and stable.  Summary below.      Summary:    HPI:   88 y/o WM presents to the emergency department via ambulance for complaints of fever (subjectively 103.2)/chills patient and patient's wife relate having rigors prior to arrival and then began projectile vomiting.  Patient has a significant medical history including CAD/CABG / MI/coronary stents, atrial fibrillation but is not anticoagulated due to being 87 and a fall risk. Remote history of prostate cancer, chronic back pain with structural surgeries,history of AAA as well when questioned about pain patient denies any new pain or pain worse than his chronic issues.  They deny any recent known sick contacts.  He is awake alert and interacting appropriately.       ER course:  Arrived  febrile, slight tachycardia and his blood pressure is normotensive.  Will initiate septic workup and administer fluids and antiemetic.  Empiric antibiotics (Rocephin/azithromycin it is) initiated in the ED.  Chest x-ray suggests right middle lobe pneumonia.  Blood cultures also collected.  Urinalysis was  "unremarkable, troponin 0.023, BNP 3 0, lactate 1.4, mycoplasma was positive.  WBCs 10.5, H&H 10.7/31.2, potassium 4.3, creatinine 0.69, glucose 159.  O2 saturation 94% on room air in the ED. he did cough up some "halina nail" appearing sputum     Will admit to hospital medicine service, continue antibiotics, await blood culture results, place on telemetry.        * No surgery found *       Hospital Course:   8/28-ns- patient continues to improve. Labs stable. Hemoptysis has decreased. We will continue with our current therapy.  8/29-ns- Patient had a bad night per wife. Up a lot. He is now feeling good, OOB today with PT, hemoptysis improved per wife at bedside. Labs stable. Continue abx. Likely dc tomorrow on po Levaquin.  8/30-dt-NAD. Patient rested better last night and is eager to participate in PT today. He did not qualify for home O2 with sats in the upper 90's during ambulation. Will DC supplemental O2 and begin preparing for discharge to home tomorrow. Wife at bedside in agreement with plan. No hemoptysis observed this am. Will remain an azithromycin/Rocephin today. On droplet isolation for mycoplasma.   8/31-djm-RA, repeat CRP pending, intermittent delirium but reorientated well w/ wife in adjoined room, working w/ PT, hopeful dc tomorrow amid strength improving closer to baseline, other than specified above: clinically stable/afebrile, pain controlled, no sx other than above/below, daughter/spouse + pt agrees w/ below eval/tx (including recs to not c/w home rx [unless otherwise specified] or make additional inpt consults now without clear net benefit + deferring to pt/pcp to modify home regiment as needed)  9/1-djm-RA, repeat CRP improved (procal <0.5 but very mild uptrend vs prior to change abx to levofloxacin on dc given advanced age + comorbidities [suspect initial covid pna c/b bacterial PNA so delayed time for effect to fully be seen from abx from first to last procal inpt), intermittent delirium " ~stable but reorientated well w/ wife in adjoined room, HH without home o2 per inpt PT/RT so CM consult, home today per pt/wife/daughter/granddaughter (Dr. Angelica Cheng) given sustained clinical stability >=2d w/ steady improvement >=5d + delirium improvement at home, other than detailed above/below: clinically stable/afebrile, pain controlled, no sx other than above/below, no clear e/o gross functional deficits vs baseline, s/p extensive discussion/counseling about risks/benefits of inpt vs op tx options; pt w/ capacity agrees w/ below eval/tx (including recs to not c/w home rx or make additional inpt consults now without clear net benefit + deferring to pt/pcp to modify home regiment as needed) + opts for home dc (not opting for NH/LTAC/SNF/hospitalization; s/p extensive counseling about pt's comorbidities/likelihood of disease progression/readmission + inpt vs op benefits/risks; pt reports understanding likelihood that pt's medical conditions may worsen in near term future even despite optimal rx management thus ED return precautions discussed; all questions answered to pt's reported satisfaction)    Fall  Pertinent negatives include no fever.   Shoulder Pain   Pertinent negatives include no fever. Review of Systems   Constitutional:  Positive for fatigue. Negative for fever.   Respiratory:  Negative for cough (presently improved) and shortness of breath (presently improved).    Cardiovascular:  Negative for chest pain.   Musculoskeletal:  Positive for arthralgias (right hand and right shoulder), back pain, gait problem and leg pain.   Neurological:  Positive for memory loss.   Hematological:  Bruises/bleeds easily.        Objective:      Physical Exam  Constitutional:       General: He is not in acute distress.     Appearance: Normal appearance. He is normal weight.   Cardiovascular:      Rate and Rhythm: Normal rate.   Pulmonary:      Effort: No respiratory distress.      Breath sounds: No wheezing or rhonchi.    Abdominal:      Tenderness: There is no abdominal tenderness.   Musculoskeletal:         General: Tenderness (right shoulder, right hand at base of thumb and low back) present.   Skin:     Comments: Bruising at the heel of the right hand   Neurological:      Mental Status: He is alert.   Psychiatric:         Mood and Affect: Mood normal.         Behavior: Behavior normal.       Assessment:       Problem List Items Addressed This Visit          Cardiac/Vascular    S/P CABG (coronary artery bypass graft)       Endocrine    Postoperative hypothyroidism    Relevant Orders    TSH       Orthopedic    Fall    Relevant Orders    X-ray Shoulder 2 or More Views Right (Completed)    X-Ray Hand 3 view Right (Completed)     Other Visit Diagnoses       COVID-19    -  Primary    Pneumonia of right middle lobe due to infectious organism        Relevant Orders    X-Ray Chest PA And Lateral (Completed)    Acute pain of right shoulder        Relevant Orders    X-ray Shoulder 2 or More Views Right (Completed)    X-Ray Hand 3 view Right (Completed)    Right hand pain        Relevant Orders    X-ray Shoulder 2 or More Views Right (Completed)    X-Ray Hand 3 view Right (Completed)    Pneumonia of right middle lobe due to Mycoplasma pneumoniae                Plan:       Nando was seen today for hospital follow up, fall and shoulder pain.    Diagnoses and all orders for this visit:    COVID-19    Pneumonia of right middle lobe due to infectious organism  -     X-Ray Chest PA And Lateral; Future    S/P CABG (coronary artery bypass graft)    Fall, initial encounter  -     X-ray Shoulder 2 or More Views Right; Future  -     X-Ray Hand 3 view Right; Future    Acute pain of right shoulder  -     X-ray Shoulder 2 or More Views Right; Future  -     X-Ray Hand 3 view Right; Future    Right hand pain  -     X-ray Shoulder 2 or More Views Right; Future  -     X-Ray Hand 3 view Right; Future    Pneumonia of right middle lobe due to Mycoplasma  "pneumoniae    Postoperative hypothyroidism  -     TSH; Future         Check lab and x-rays.  Follow-up with Cardiology   Flu shot   Fall precautions.  Patient was doing therapy at home, consider extending that depending on shoulder and hand   reviewed, uses pain medication very sparingly    Addendum:  X-rays of the hand chest and shoulder did not show any fractures or residual pneumonia.  Message sent to patient and his family    Portions of this note may have been created with voice recognition software. Occasional "wrong-word" or "sound-a-like" substitutions may have occurred due to the inherent limitations of voice recognition software. Please, read the note carefully and recognize, using context, where substitutions have occurred.    "

## 2022-09-15 ENCOUNTER — PATIENT MESSAGE (OUTPATIENT)
Dept: INTERNAL MEDICINE | Facility: CLINIC | Age: 87
End: 2022-09-15
Payer: MEDICARE

## 2022-10-04 ENCOUNTER — PATIENT MESSAGE (OUTPATIENT)
Dept: ELECTROPHYSIOLOGY | Facility: CLINIC | Age: 87
End: 2022-10-04
Payer: MEDICARE

## 2022-10-05 ENCOUNTER — DOCUMENT SCAN (OUTPATIENT)
Dept: HOME HEALTH SERVICES | Facility: HOSPITAL | Age: 87
End: 2022-10-05
Payer: MEDICARE

## 2022-10-11 PROCEDURE — G0179 MD RECERTIFICATION HHA PT: HCPCS | Mod: ,,, | Performed by: INTERNAL MEDICINE

## 2022-10-11 PROCEDURE — G0179 PR HOME HEALTH MD RECERTIFICATION: ICD-10-PCS | Mod: ,,, | Performed by: INTERNAL MEDICINE

## 2022-10-19 ENCOUNTER — DOCUMENT SCAN (OUTPATIENT)
Dept: HOME HEALTH SERVICES | Facility: HOSPITAL | Age: 87
End: 2022-10-19
Payer: MEDICARE

## 2022-10-19 ENCOUNTER — EXTERNAL HOME HEALTH (OUTPATIENT)
Dept: HOME HEALTH SERVICES | Facility: HOSPITAL | Age: 87
End: 2022-10-19
Payer: MEDICARE

## 2022-11-08 ENCOUNTER — DOCUMENT SCAN (OUTPATIENT)
Dept: HOME HEALTH SERVICES | Facility: HOSPITAL | Age: 87
End: 2022-11-08
Payer: MEDICARE

## 2022-11-14 ENCOUNTER — EXTERNAL HOME HEALTH (OUTPATIENT)
Dept: HOME HEALTH SERVICES | Facility: HOSPITAL | Age: 87
End: 2022-11-14
Payer: MEDICARE

## 2022-11-15 ENCOUNTER — TELEPHONE (OUTPATIENT)
Dept: CARDIOLOGY | Facility: HOSPITAL | Age: 87
End: 2022-11-15
Payer: MEDICARE

## 2022-11-15 NOTE — TELEPHONE ENCOUNTER
LVM for patient to contact the clinic re: scheduling a PPM reprogramming visit in [facility] demonstrated rare R wave undersensing despite normal lead #'s.  Will need to coordinate something with patient in the next week, if he is agreeable.

## 2022-11-15 NOTE — TELEPHONE ENCOUNTER
Biotronik M received, single chamber PPM, RV paced 30%.  Rare R wave undersensing noted on periodic IEGM, despite lead #'s that appear WNL/stable  Recall for Jan 2023  Pt lives in [place]    Can this be addressed at this next f/u or does he need a sooner appt?

## 2022-11-16 ENCOUNTER — TELEPHONE (OUTPATIENT)
Dept: CARDIOLOGY | Facility: HOSPITAL | Age: 87
End: 2022-11-16

## 2022-11-16 ENCOUNTER — DOCUMENT SCAN (OUTPATIENT)
Dept: HOME HEALTH SERVICES | Facility: HOSPITAL | Age: 87
End: 2022-11-16
Payer: MEDICARE

## 2022-11-16 ENCOUNTER — CLINICAL SUPPORT (OUTPATIENT)
Dept: CARDIOLOGY | Facility: HOSPITAL | Age: 87
End: 2022-11-16
Payer: MEDICARE

## 2022-11-16 DIAGNOSIS — R55 SYNCOPE AND COLLAPSE: ICD-10-CM

## 2022-11-16 DIAGNOSIS — Z95.0 PRESENCE OF CARDIAC PACEMAKER: ICD-10-CM

## 2022-11-16 DIAGNOSIS — R00.1 BRADYCARDIA, UNSPECIFIED: ICD-10-CM

## 2022-11-16 PROCEDURE — 93296 REM INTERROG EVL PM/IDS: CPT | Performed by: INTERNAL MEDICINE

## 2022-11-16 NOTE — TELEPHONE ENCOUNTER
LVM (#2) for patient to contact the clinic to arrange f/u of his Biotronik PPM, rare R wave undersensing noted on RHM pIEGMs

## 2022-11-29 ENCOUNTER — CLINICAL SUPPORT (OUTPATIENT)
Dept: CARDIOLOGY | Facility: HOSPITAL | Age: 87
End: 2022-11-29
Attending: INTERNAL MEDICINE
Payer: MEDICARE

## 2022-11-29 DIAGNOSIS — I49.8 OTHER SPECIFIED CARDIAC ARRHYTHMIAS: ICD-10-CM

## 2022-11-29 PROCEDURE — 93279 PRGRMG DEV EVAL PM/LDLS PM: CPT

## 2022-11-29 PROCEDURE — 93279 PRGRMG DEV EVAL PM/LDLS PM: CPT | Mod: 26,HCNC,, | Performed by: INTERNAL MEDICINE

## 2022-11-29 PROCEDURE — 93279 CARDIAC DEVICE CHECK - IN CLINIC & HOSPITAL: ICD-10-PCS | Mod: 26,HCNC,, | Performed by: INTERNAL MEDICINE

## 2022-12-09 ENCOUNTER — PES CALL (OUTPATIENT)
Dept: ADMINISTRATIVE | Facility: CLINIC | Age: 87
End: 2022-12-09
Payer: MEDICARE

## 2022-12-10 PROCEDURE — G0179 MD RECERTIFICATION HHA PT: HCPCS | Mod: ,,, | Performed by: INTERNAL MEDICINE

## 2022-12-10 PROCEDURE — G0179 PR HOME HEALTH MD RECERTIFICATION: ICD-10-PCS | Mod: ,,, | Performed by: INTERNAL MEDICINE

## 2023-01-03 RX ORDER — OXYCODONE AND ACETAMINOPHEN 5; 325 MG/1; MG/1
1 TABLET ORAL EVERY 12 HOURS PRN
Qty: 60 TABLET | Refills: 0 | Status: SHIPPED | OUTPATIENT
Start: 2023-01-03 | End: 2023-04-11 | Stop reason: SDUPTHER

## 2023-01-03 NOTE — TELEPHONE ENCOUNTER
Care Due:                  Date            Visit Type   Department     Provider  --------------------------------------------------------------------------------                                [facility] INTERNAL  Last Visit: 09-      FOLLOW UP    MEDICINE       Rogelio Oliver  Next Visit: None Scheduled  None         None Found                                                            Last  Test          Frequency    Reason                     Performed    Due Date  --------------------------------------------------------------------------------    Lipid Panel.  12 months..  atorvastatin.............  Not Found    Overdue    [employer] Embedded Care Gaps. Reference number: [id number]. 1/02/2023   7:08:14 PM CST

## 2023-01-09 DIAGNOSIS — I48.0 PAF (PAROXYSMAL ATRIAL FIBRILLATION): Primary | ICD-10-CM

## 2023-01-23 DIAGNOSIS — I25.10 CORONARY ARTERY DISEASE INVOLVING NATIVE CORONARY ARTERY OF NATIVE HEART WITHOUT ANGINA PECTORIS: ICD-10-CM

## 2023-01-23 DIAGNOSIS — E78.2 MIXED HYPERLIPIDEMIA: ICD-10-CM

## 2023-01-23 DIAGNOSIS — I10 HYPERTENSION, BENIGN: ICD-10-CM

## 2023-01-23 RX ORDER — ATORVASTATIN CALCIUM 40 MG/1
40 TABLET, FILM COATED ORAL DAILY
Qty: 90 TABLET | Refills: 3 | Status: CANCELLED | OUTPATIENT
Start: 2023-01-23

## 2023-01-23 RX ORDER — ATORVASTATIN CALCIUM 40 MG/1
40 TABLET, FILM COATED ORAL DAILY
Qty: 90 TABLET | Refills: 3 | Status: SHIPPED | OUTPATIENT
Start: 2023-01-23 | End: 2024-01-24

## 2023-01-23 RX ORDER — METOPROLOL SUCCINATE 25 MG/1
25 TABLET, EXTENDED RELEASE ORAL DAILY
Qty: 90 TABLET | Refills: 3 | Status: SHIPPED | OUTPATIENT
Start: 2023-01-23 | End: 2024-02-27

## 2023-01-23 NOTE — TELEPHONE ENCOUNTER
No new care gaps identified.  [facility] Embedded Care Gaps. Reference number: [id number]. 1/23/2023   11:06:48 AM CST

## 2023-01-23 NOTE — TELEPHONE ENCOUNTER
No new care gaps identified.  [facility] Embedded Care Gaps. Reference number: [id number]. 1/23/2023   11:44:55 AM CST

## 2023-01-23 NOTE — TELEPHONE ENCOUNTER
----- Message from Diana Devine sent at 1/23/2023 11:32 AM CST -----  Contact: [phone] Patient  The atorvastatin needs to be resent please. I called the pharmacy and they stated their system went down on Monday and Tuesday so they lost everything that was e-scribed. Please resend . Pt is out of medication and needs it. Thank you    Requesting an RX refill or new RX.  Is this a refill or new RX: new  RX name and strength (copy/paste from chart):  atorvastatin (LIPITOR) 40 MG tablet  Is this a 30 day or 90 day RX: 90  Pharmacy name and phone # (copy/paste from chart):    [id number] - [place] - [address] AT [place] [address] [address] [address]  Phone: [phone] Fax: [phone]      Requesting an RX refill or new RX.  Is this a refill or new RX: new  RX name and strength (copy/paste from chart):  metoprolol succinate (TOPROL-XL) 25 MG 24 hr tablet  Is this a 30 day or 90 day RX: 90  [id number] - [place] - [address] AT [place] [address] [address] [address]  Phone: [phone] Fax: [phone]

## 2023-01-23 NOTE — TELEPHONE ENCOUNTER
----- Message from Helen Call sent at 1/23/2023 10:59 AM CST -----  Pharmacy is calling to clarify an RX.  RX name:  atorvastatin (LIPITOR) 40 MG tablet      What do they need to clarify:  refill    Comments:    [id number] - [place] - [address] AT [facility]   Phone:  [phone]  Fax:  [phone]

## 2023-02-07 DIAGNOSIS — Z00.00 ENCOUNTER FOR MEDICARE ANNUAL WELLNESS EXAM: ICD-10-CM

## 2023-02-08 PROCEDURE — G0179 MD RECERTIFICATION HHA PT: HCPCS | Mod: ,,, | Performed by: INTERNAL MEDICINE

## 2023-02-08 PROCEDURE — G0179 PR HOME HEALTH MD RECERTIFICATION: ICD-10-PCS | Mod: ,,, | Performed by: INTERNAL MEDICINE

## 2023-02-09 DIAGNOSIS — Z00.00 ENCOUNTER FOR MEDICARE ANNUAL WELLNESS EXAM: ICD-10-CM

## 2023-02-14 ENCOUNTER — CLINICAL SUPPORT (OUTPATIENT)
Dept: CARDIOLOGY | Facility: HOSPITAL | Age: 88
End: 2023-02-14
Payer: MEDICARE

## 2023-02-14 DIAGNOSIS — R55 SYNCOPE AND COLLAPSE: ICD-10-CM

## 2023-02-14 DIAGNOSIS — I49.5 SICK SINUS SYNDROME: ICD-10-CM

## 2023-02-14 DIAGNOSIS — Z95.0 PRESENCE OF CARDIAC PACEMAKER: ICD-10-CM

## 2023-02-14 PROCEDURE — 93296 REM INTERROG EVL PM/IDS: CPT | Mod: HCNC | Performed by: INTERNAL MEDICINE

## 2023-02-14 PROCEDURE — 93294 CARDIAC DEVICE CHECK - REMOTE: ICD-10-PCS | Mod: HCNC,,, | Performed by: INTERNAL MEDICINE

## 2023-02-14 PROCEDURE — 93294 REM INTERROG EVL PM/LDLS PM: CPT | Mod: HCNC,,, | Performed by: INTERNAL MEDICINE

## 2023-02-17 ENCOUNTER — EXTERNAL HOME HEALTH (OUTPATIENT)
Dept: HOME HEALTH SERVICES | Facility: HOSPITAL | Age: 88
End: 2023-02-17
Payer: MEDICARE

## 2023-02-27 ENCOUNTER — DOCUMENT SCAN (OUTPATIENT)
Dept: HOME HEALTH SERVICES | Facility: HOSPITAL | Age: 88
End: 2023-02-27
Payer: MEDICARE

## 2023-03-23 ENCOUNTER — EXTERNAL HOME HEALTH (OUTPATIENT)
Dept: HOME HEALTH SERVICES | Facility: HOSPITAL | Age: 88
End: 2023-03-23
Payer: MEDICARE

## 2023-04-11 ENCOUNTER — OFFICE VISIT (OUTPATIENT)
Dept: INTERNAL MEDICINE | Facility: CLINIC | Age: 88
End: 2023-04-11
Payer: MEDICARE

## 2023-04-11 DIAGNOSIS — G95.89 MYELOMALACIA OF CERVICAL CORD: ICD-10-CM

## 2023-04-11 DIAGNOSIS — E89.0 POSTOPERATIVE HYPOTHYROIDISM: Primary | ICD-10-CM

## 2023-04-11 DIAGNOSIS — I20.9 CARDIAC ANGINA: ICD-10-CM

## 2023-04-11 DIAGNOSIS — I70.0 AORTIC ATHEROSCLEROSIS: ICD-10-CM

## 2023-04-11 DIAGNOSIS — R35.0 URINARY FREQUENCY: ICD-10-CM

## 2023-04-11 DIAGNOSIS — I50.22 CHRONIC HFREF (HEART FAILURE WITH REDUCED EJECTION FRACTION): ICD-10-CM

## 2023-04-11 DIAGNOSIS — G30.9 ALZHEIMER'S DEMENTIA WITH ANXIETY, UNSPECIFIED DEMENTIA SEVERITY, UNSPECIFIED TIMING OF DEMENTIA ONSET: ICD-10-CM

## 2023-04-11 DIAGNOSIS — I47.29 NONSUSTAINED VENTRICULAR TACHYCARDIA: ICD-10-CM

## 2023-04-11 DIAGNOSIS — M46.92 INFLAMMATORY SPONDYLOPATHY OF CERVICAL REGION: ICD-10-CM

## 2023-04-11 DIAGNOSIS — F02.84 ALZHEIMER'S DEMENTIA WITH ANXIETY, UNSPECIFIED DEMENTIA SEVERITY, UNSPECIFIED TIMING OF DEMENTIA ONSET: ICD-10-CM

## 2023-04-11 DIAGNOSIS — I10 HYPERTENSION, BENIGN: ICD-10-CM

## 2023-04-11 PROCEDURE — 99214 OFFICE O/P EST MOD 30 MIN: CPT | Mod: HCNC,95,, | Performed by: INTERNAL MEDICINE

## 2023-04-11 PROCEDURE — 99214 PR OFFICE/OUTPT VISIT, EST, LEVL IV, 30-39 MIN: ICD-10-PCS | Mod: HCNC,95,, | Performed by: INTERNAL MEDICINE

## 2023-04-11 RX ORDER — OXYCODONE AND ACETAMINOPHEN 5; 325 MG/1; MG/1
1 TABLET ORAL EVERY 12 HOURS PRN
Qty: 60 TABLET | Refills: 0 | Status: SHIPPED | OUTPATIENT
Start: 2023-04-11 | End: 2023-10-13 | Stop reason: SDUPTHER

## 2023-04-11 NOTE — PROGRESS NOTES
Subjective:       Patient ID: Nando Salguero is a 88 y.o. male.    Chief Complaint: Follow-up      The patient location is: [place]  The chief complaint leading to consultation is: Follow up, pain refill.     Visit type: audiovisual    Face to Face time with patient: 20 minutes  25 minutes of total time spent on the encounter, which includes face to face time and non-face to face time preparing to see the patient (eg, review of tests), Obtaining and/or reviewing separately obtained history, Documenting clinical information in the electronic or other health record, Independently interpreting results (not separately reported) and communicating results to the patient/family/caregiver, or Care coordination (not separately reported).         Each patient to whom he or she provides medical services by telemedicine is:  (1) informed of the relationship between the physician and patient and the respective role of any other health care provider with respect to management of the patient; and (2) notified that he or she may decline to receive medical services by telemedicine and may withdraw from such care at any time.    Notes:     HPI: Pt seen with family. He is having more confusion. Gets confused at night. Has urinated in the bedroom thinking it was bathroom.  Several of these notes were made by his granddaughter, Angelica Cheng, who is a primary care hospital physician at a local [facility] hospital.  She checks on him regularly.  They have tried to use his pain med sparingly but he has had severe spine pain for years.  His last fall was 4 months ago but he was evaluated by CT imaging.  He has not had blood labs in several months including thyroid.  I would also like to check a urine to make sure this isn't a urinary infection.  He was able to carry on a conversation during the interview but did seem to get confused occasionally.       Review of Systems   Constitutional:  Positive for activity change. Negative for unexpected  weight change.   HENT:  Negative for hearing loss and rhinorrhea.    Eyes:  Negative for discharge and visual disturbance.   Respiratory:  Negative for chest tightness and wheezing.    Cardiovascular:  Negative for chest pain and palpitations.   Gastrointestinal:  Positive for constipation. Negative for blood in stool, diarrhea and vomiting.   Endocrine: Negative for polydipsia and polyuria.   Genitourinary:  Positive for frequency. Negative for difficulty urinating, hematuria and urgency.   Musculoskeletal:  Positive for arthralgias and neck pain. Negative for joint swelling.   Neurological:  Negative for weakness and headaches.   Psychiatric/Behavioral:  Positive for confusion and dysphoric mood.        Objective:      Physical Exam  Constitutional:       General: He is not in acute distress.     Comments: He answers some questions but his wife and granddaughter's fiance answer the majority of the questions   Pulmonary:      Effort: No respiratory distress.   Neurological:      Mental Status: He is alert.   Psychiatric:         Behavior: Behavior normal.       Assessment:       Problem List Items Addressed This Visit          Neuro    Myelomalacia of cervical cord    Alzheimer's dementia       Cardiac/Vascular    Hypertension, benign    Relevant Orders    CBC Auto Differential    Basic Metabolic Panel    TSH    CULTURE, URINE    Cardiac angina    Aortic atherosclerosis    Nonsustained ventricular tachycardia    Chronic HFrEF (heart failure with reduced ejection fraction) ICM NYHA2       Endocrine    Postoperative hypothyroidism - Primary    Relevant Orders    CBC Auto Differential    Basic Metabolic Panel    TSH    CULTURE, URINE       Orthopedic    Inflammatory spondylopathy of cervical region    Relevant Orders    CBC Auto Differential    Basic Metabolic Panel    TSH    CULTURE, URINE     Other Visit Diagnoses       Urinary frequency        Relevant Orders    CBC Auto Differential    Basic Metabolic Panel    TSH  "   CULTURE, URINE            Plan:       Nando was seen today for follow-up.    Diagnoses and all orders for this visit:    Postoperative hypothyroidism  -     CBC Auto Differential; Future  -     Basic Metabolic Panel; Future  -     TSH; Future  -     CULTURE, URINE; Future    Hypertension, benign  -     CBC Auto Differential; Future  -     Basic Metabolic Panel; Future  -     TSH; Future  -     CULTURE, URINE; Future    Inflammatory spondylopathy of cervical region  -     CBC Auto Differential; Future  -     Basic Metabolic Panel; Future  -     TSH; Future  -     CULTURE, URINE; Future    Urinary frequency  -     CBC Auto Differential; Future  -     Basic Metabolic Panel; Future  -     TSH; Future  -     CULTURE, URINE; Future    Myelomalacia of cervical cord    Chronic HFrEF (heart failure with reduced ejection fraction)  Comments:  Will need to follow-up with cardiology if he has not seen them in a while.    Cardiac angina    Nonsustained ventricular tachycardia  Comments:  Will need to follow-up with cardiology if he has not seen them in a while.    Aortic atherosclerosis  Comments:  Will need to follow-up with cardiology if he has not seen them in a while.    Alzheimer's dementia with anxiety, unspecified dementia severity, unspecified timing of dementia onset  Comments:  Continue to monitor.  Update labs.  May benefit from neurology assessment    Other orders  -     oxyCODONE-acetaminophen (PERCOCET) 5-325 mg per tablet; Take 1 tablet by mouth every 12 (twelve) hours as needed for Pain.           I am a little hesitant to add a sleeping medicine in general since he takes a pain pill but also without getting some testing done.  The family expressed understanding.  Studies will be done tomorrow        Portions of this note may have been created with voice recognition software. Occasional "wrong-word" or "sound-a-like" substitutions may have occurred due to the inherent limitations of voice recognition " software. Please, read the note carefully and recognize, using context, where substitutions have occurred.

## 2023-04-12 ENCOUNTER — LAB VISIT (OUTPATIENT)
Dept: LAB | Facility: HOSPITAL | Age: 88
End: 2023-04-12
Attending: INTERNAL MEDICINE
Payer: MEDICARE

## 2023-04-12 DIAGNOSIS — I10 HYPERTENSION, BENIGN: ICD-10-CM

## 2023-04-12 DIAGNOSIS — M46.92 INFLAMMATORY SPONDYLOPATHY OF CERVICAL REGION: ICD-10-CM

## 2023-04-12 DIAGNOSIS — E89.0 POSTOPERATIVE HYPOTHYROIDISM: ICD-10-CM

## 2023-04-12 DIAGNOSIS — R35.0 URINARY FREQUENCY: ICD-10-CM

## 2023-04-12 LAB
ANION GAP SERPL CALC-SCNC: 9 MMOL/L (ref 8–16)
BASOPHILS # BLD AUTO: 0.04 K/UL (ref 0–0.2)
BASOPHILS NFR BLD: 0.5 % (ref 0–1.9)
BUN SERPL-MCNC: 24 MG/DL (ref 8–23)
CALCIUM SERPL-MCNC: 9.7 MG/DL (ref 8.7–10.5)
CHLORIDE SERPL-SCNC: 106 MMOL/L (ref 95–110)
CO2 SERPL-SCNC: 25 MMOL/L (ref 23–29)
CREAT SERPL-MCNC: 0.7 MG/DL (ref 0.5–1.4)
DIFFERENTIAL METHOD: ABNORMAL
EOSINOPHIL # BLD AUTO: 0.3 K/UL (ref 0–0.5)
EOSINOPHIL NFR BLD: 3.8 % (ref 0–8)
ERYTHROCYTE [DISTWIDTH] IN BLOOD BY AUTOMATED COUNT: 17 % (ref 11.5–14.5)
EST. GFR  (NO RACE VARIABLE): >60 ML/MIN/1.73 M^2
GLUCOSE SERPL-MCNC: 101 MG/DL (ref 70–110)
HCT VFR BLD AUTO: 34 % (ref 40–54)
HGB BLD-MCNC: 10.4 G/DL (ref 14–18)
IMM GRANULOCYTES # BLD AUTO: 0.02 K/UL (ref 0–0.04)
IMM GRANULOCYTES NFR BLD AUTO: 0.3 % (ref 0–0.5)
LYMPHOCYTES # BLD AUTO: 1.3 K/UL (ref 1–4.8)
LYMPHOCYTES NFR BLD: 18 % (ref 18–48)
MCH RBC QN AUTO: 26.7 PG (ref 27–31)
MCHC RBC AUTO-ENTMCNC: 30.6 G/DL (ref 32–36)
MCV RBC AUTO: 87 FL (ref 82–98)
MONOCYTES # BLD AUTO: 0.6 K/UL (ref 0.3–1)
MONOCYTES NFR BLD: 8.1 % (ref 4–15)
NEUTROPHILS # BLD AUTO: 5.1 K/UL (ref 1.8–7.7)
NEUTROPHILS NFR BLD: 69.3 % (ref 38–73)
NRBC BLD-RTO: 0 /100 WBC
PLATELET # BLD AUTO: 261 K/UL (ref 150–450)
PMV BLD AUTO: 9.9 FL (ref 9.2–12.9)
POTASSIUM SERPL-SCNC: 4.2 MMOL/L (ref 3.5–5.1)
RBC # BLD AUTO: 3.9 M/UL (ref 4.6–6.2)
SODIUM SERPL-SCNC: 140 MMOL/L (ref 136–145)
T4 FREE SERPL-MCNC: 1.53 NG/DL (ref 0.71–1.51)
TSH SERPL DL<=0.005 MIU/L-ACNC: 4.41 UIU/ML (ref 0.4–4)
WBC # BLD AUTO: 7.32 K/UL (ref 3.9–12.7)

## 2023-04-12 PROCEDURE — 36415 COLL VENOUS BLD VENIPUNCTURE: CPT | Mod: HCNC | Performed by: INTERNAL MEDICINE

## 2023-04-12 PROCEDURE — 85025 COMPLETE CBC W/AUTO DIFF WBC: CPT | Mod: HCNC | Performed by: INTERNAL MEDICINE

## 2023-04-12 PROCEDURE — 84439 ASSAY OF FREE THYROXINE: CPT | Mod: HCNC | Performed by: INTERNAL MEDICINE

## 2023-04-12 PROCEDURE — 87086 URINE CULTURE/COLONY COUNT: CPT | Mod: HCNC | Performed by: INTERNAL MEDICINE

## 2023-04-12 PROCEDURE — 80048 BASIC METABOLIC PNL TOTAL CA: CPT | Mod: HCNC | Performed by: INTERNAL MEDICINE

## 2023-04-12 PROCEDURE — 84443 ASSAY THYROID STIM HORMONE: CPT | Mod: HCNC | Performed by: INTERNAL MEDICINE

## 2023-04-13 ENCOUNTER — PATIENT MESSAGE (OUTPATIENT)
Dept: INTERNAL MEDICINE | Facility: CLINIC | Age: 88
End: 2023-04-13
Payer: MEDICARE

## 2023-04-13 LAB — BACTERIA UR CULT: NO GROWTH

## 2023-04-14 ENCOUNTER — PATIENT MESSAGE (OUTPATIENT)
Dept: INTERNAL MEDICINE | Facility: CLINIC | Age: 88
End: 2023-04-14
Payer: MEDICARE

## 2023-04-17 ENCOUNTER — PATIENT MESSAGE (OUTPATIENT)
Dept: INTERNAL MEDICINE | Facility: CLINIC | Age: 88
End: 2023-04-17
Payer: MEDICARE

## 2023-04-17 RX ORDER — HYDROXYZINE HYDROCHLORIDE 25 MG/1
25 TABLET, FILM COATED ORAL NIGHTLY
Qty: 30 TABLET | Refills: 6 | Status: SHIPPED | OUTPATIENT
Start: 2023-04-17 | End: 2023-05-12

## 2023-04-18 ENCOUNTER — PES CALL (OUTPATIENT)
Dept: ADMINISTRATIVE | Facility: CLINIC | Age: 88
End: 2023-04-18
Payer: MEDICARE

## 2023-05-10 ENCOUNTER — PATIENT MESSAGE (OUTPATIENT)
Dept: INTERNAL MEDICINE | Facility: CLINIC | Age: 88
End: 2023-05-10
Payer: MEDICARE

## 2023-05-12 RX ORDER — HYDROXYZINE HYDROCHLORIDE 50 MG/1
50 TABLET, FILM COATED ORAL NIGHTLY
Qty: 30 TABLET | Refills: 6 | Status: SHIPPED | OUTPATIENT
Start: 2023-05-12 | End: 2023-11-29

## 2023-05-16 PROBLEM — Z95.0 CARDIAC PACEMAKER IN SITU: Status: ACTIVE | Noted: 2023-05-16

## 2023-05-16 NOTE — PROGRESS NOTES
Mr. Salguero is a patient of Dr. Beaulieu and was last seen in clinic 8/2/2022.      Subjective:   Patient ID:  Nando Salguero is an 88 y.o. male who presents for follow up of Pacemaker Check and Atrial Fibrillation  .     HPI:    Mr. Salguero is an 88 y.o. male with HTN, AF, CAD (CABG, PCI), syncope, PPM (4/2022) here for follow up.     Background:    Referring Cardiologist: Chirag Elise MD  Primary Care Physician: Rogelio Oliver MD    Mr. Salguero has a hx of hypertension, paroxysmal atrial fibrillation, and coronary artery disease s/p bypass surgery and PCI with preserved LV function. He was admitted to [facility] in April of 2021 due to an episode of syncope. He reports sudden loss of consciousness when carrying a pot of gumbo. He reports sudden syncope without warning. He felt a little confused upon waking but then was back at his baseline. His syncope was felt to be due to orthostatic hypotension. He reports a similar episode in December 2020. While hospitalized the recorded orthostatic vital signs in the ER were not consistent with orthostatic hypotension. The inpatient progress note the next day indicated they were repeated and were consistent with orthostatic hypotension. He was given IV fluids and his BP medications were adjusted. He saw Dr. Elise in follow-up and a PET stress test and an event monitor were ordered. The PET stress test noted no ischemia. The 30 day event monitor noted 70% pAF burden. Episodes of dizziness corresponded to rate controlled AF. He reports since this episode he has had increased fatigue and has had multiple falls with head injury. Dr. Elise discussed Watchman implant with him however he was not interested. Of note he was taken off all anti-HTN medications in the hospital and his BP is now running high (170s/80s).    ECGs in Epic which show normal sinus rhythm or rate controlled AF.    At our first visit we discussed potential arrhythmic cause of his syncope. Discussed  ILR implant. Also discussed Watchman implant. He desired to think about it.    Mr. Salguero returned for follow-up 11/2021. Reports having a recent episode of sudden weakness. Did not pass out. Passed quickly then felt normal. After discussion he elected for ILR implant which was done 11/2021.    Mr. Salguero presented for discussion 3/2022 regarding recommendation for PPM implant. He had an observed symptomatic 3 second pause during AF in February 2021. He was on 3.125mg carvedilol bid. This was stopped. He then had a 28 second run of nonsustained wide complex tachycardia. 25mg of metoprolol succinate was started (likely NSVT, had bigeminal ectopy after on ILR of the same morphology). He was also having light-headedness in the morning with getting up out of bed that resolved with holding his amlodipine. After discussion he elected for PPM.    4/2022: single chamber PPM (LBBA pacing) and ILR removal.    8/22/2022: Mr. Salguero returns for 3 month post PPM implant follow-up. Device healed up well. Still has some orthostatic light-headedness. He had a fall a few weeks ago and was seen in the ER.  In-clinic PPM check notes stable lead parameters with 32% RV pacing. No VT, estimated battery longevity is 13 years.  In summary, Mr. Salguero is a pleasant 87 year-old man with hypertension, permanent atrial fibrillation, and coronary artery disease s/p bypass surgery and PCI with preserved LV function presents for follow-up post-ILR implant implanted for episodes of syncope. He has been observed to have episodes of sudden slowing of ventricular rates with pauses up to 3 seconds with associated light-headedness. He also has episodes of nonsustained VT (normal LV function, no ischemia/infarct on recent PET stress test). Suspect syncope is from SVR/pauses. Discussed PPM followed by uptitrate of beta-blocker therapy to which he was agreeable and is now s/p single chamber PPM implant with lead in the LBBA. Device is operating normally. He  continues to have some orthostatic light-headedness. He refuses anticoagulation for AF.    Update (05/23/2023):    9/1/2022: Hospitalized with fever    10/15/2022: Fall while working on vacuum.     11/15/2022: R wave undersensing noted on device. Changed RV sensitivity from AUTO to fixed 2.5mV  (Auto sensing in the setting of large amplitude PVC's can adjust sensitivity such that the next R wave of a smaller amplitude is undersensed)    12/11/2022: Fall while walking to the bathroom with his walker    Today he is here with his daughter. Pt has some confusion. He continues to have falls (most recent Sunday) but they are not as severe as last year. Small skin tear to left upper arm. Nocturia and forgets to use bedside commode. Low appetite. Drinks Ensure as a supplement. No CP, ABRAMS, palps. He says he has no true syncope when he falls.    He has declined watchman implant and OAC in the past. High risk for bleeds due to frequent falls. He is currently being treated with toprol 25mg daily for HR control.  Kidney function is stable, with a creatinine of 0.7 on 4/12/2023.    Device Interrogation (5/23/2023) reveals an intrinsic AF with stable lead and device function. LVAT <80ms. Bipolar impedence 624. Unipolar impedence 351. Bipolar threshold 1.2@0.4. Unipolar threshold 0.7@0.4. 100% AF. No ventricular arrhythmias or treated episodes were noted.  He paces 26% in the RV. Estimated battery longevity 11yrs.     I have personally reviewed the patient's EKG today, which shows AF at 63bpm. QRS is 102. QTc is 437.    Relevant Cardiac Test Results:    2D Echo (4/4/2021):  Ejection Fraction 56%     Current Outpatient Medications   Medication Sig    amLODIPine (NORVASC) 5 MG tablet Take 2.5 mg by mouth once daily.    aspirin (ECOTRIN) 81 MG EC tablet Take 1 tablet by mouth Daily.    atorvastatin (LIPITOR) 40 MG tablet Take 1 tablet (40 mg total) by mouth once daily.    calcium carbonate/vitamin D3 (CALCIUM 500 + D, D3, ORAL) Take  by mouth. Take 2 gummy vitamins. 500 mg + 23 mcg per serving    famotidine (PEPCID) 20 MG tablet Take 1 tablet (20 mg total) by mouth nightly as needed for Heartburn.    ferrous gluconate (FERGON) 324 MG tablet TAKE ONE TABLET BY MOUTH ONCE DAILY WITH BREAKFAST (Patient taking differently: Take 324 mg by mouth daily with breakfast.)    hydrOXYzine HCL (ATARAX) 50 MG tablet Take 1 tablet (50 mg total) by mouth every evening.    levothyroxine (SYNTHROID) 125 MCG tablet Take 1 tablet (125 mcg total) by mouth every morning.    LIDOcaine-prilocaine (EMLA) cream APPLY TO AFFECTED AREA EVERY DAY AS NEEDED    metoprolol succinate (TOPROL-XL) 25 MG 24 hr tablet Take 1 tablet (25 mg total) by mouth once daily.    nitroGLYCERIN (NITROSTAT) 0.4 MG SL tablet Place 1 tablet (0.4 mg total) under the tongue every 5 (five) minutes as needed for Chest pain.    oxyCODONE-acetaminophen (PERCOCET) 5-325 mg per tablet Take 1 tablet by mouth every 12 (twelve) hours as needed for Pain.    cephALEXin (KEFLEX) 500 MG capsule Take 1 capsule (500 mg total) by mouth every 12 (twelve) hours.     No current facility-administered medications for this visit.     Facility-Administered Medications Ordered in Other Visits   Medication    sodium chloride 0.9% bolus 1,000 mL    sodium chloride 0.9% bolus 1,000 mL       Review of Systems   Constitutional: Positive for decreased appetite. Negative for malaise/fatigue.   Cardiovascular:  Negative for chest pain, dyspnea on exertion, irregular heartbeat, leg swelling and palpitations.   Respiratory:  Negative for shortness of breath.    Hematologic/Lymphatic: Negative for bleeding problem.   Skin:  Negative for rash.   Musculoskeletal:  Positive for falls. Negative for myalgias.   Gastrointestinal:  Negative for hematemesis, hematochezia and nausea.   Genitourinary:  Positive for nocturia. Negative for hematuria.   Neurological:  Negative for light-headedness.   Psychiatric/Behavioral:  Positive for memory  "loss. Negative for altered mental status.    Allergic/Immunologic: Negative for persistent infections.     Objective:          Ht 5' 5" (1.651 m)   Wt 65.4 kg (144 lb 2.9 oz)   BMI 23.99 kg/m²     Physical Exam  Vitals and nursing note reviewed.   Constitutional:       Appearance: Normal appearance. He is well-developed.   HENT:      Head: Normocephalic.      Nose: Nose normal.   Eyes:      Pupils: Pupils are equal, round, and reactive to light.   Cardiovascular:      Rate and Rhythm: Normal rate. Rhythm irregularly irregular.   Pulmonary:      Effort: No respiratory distress.      Breath sounds: Normal breath sounds.   Chest:      Comments[facility] Incision and pocket in good repair.    Musculoskeletal:         General: Normal range of motion.   Skin:     General: Skin is warm and dry.      Findings: No erythema.   Neurological:      Mental Status: He is alert and oriented to person, place, and time.   Psychiatric:         Speech: Speech normal.         Behavior: Behavior normal.         Lab Results   Component Value Date     04/12/2023    K 4.2 04/12/2023    MG 1.8 10/15/2022    BUN 24 (H) 04/12/2023    CREATININE 0.7 04/12/2023    ALT 31 12/11/2022    AST 42 12/11/2022    HGB 10.4 (L) 04/12/2023    HCT 34.0 (L) 04/12/2023    HCT 26 (L) 05/11/2014    TSH 4.408 (H) 04/12/2023    LDLCALC 35.2 (L) 12/30/2020       Recent Labs   Lab 04/03/21  2247 04/11/22  0825   INR 1.1 1.1       Assessment:     1. Cardiac pacemaker in situ    2. Chronic HFrEF (heart failure with reduced ejection fraction) ICM NYHA2    3. Nonsustained ventricular tachycardia    4. Permanent atrial fibrillation    5. Bradycardia      Plan:     In summary,  Mr. Salguero is an 88 y.o. male with HTN, AF, CAD (CABG, PCI), syncope, PPM (4/2022) here for follow up.   Mr. Salguero is doing well from a device perspective with stable lead and device function. Testing in unipolar and bipolar stable. LVAT <80ms.   RV pacing 26% which is stable. " Persistent AF. Not on OAC due to frequent falls. No ventricular arrhythmia. No palpitations. No CHF symptoms.     Continue current medication regimen and device settings.   Follow up in device clinic as scheduled.   Follow up in EP clinic in 6 mo, sooner as needed.     *A copy of this note has been sent to Dr. Beaulieu*    Follow up in about 6 months (around 11/23/2023).    ------------------------------------------------------------------    BENI Tejeda, NP-C  Cardiac Electrophysiology

## 2023-05-18 ENCOUNTER — CLINICAL SUPPORT (OUTPATIENT)
Dept: CARDIOLOGY | Facility: HOSPITAL | Age: 88
End: 2023-05-18
Payer: MEDICARE

## 2023-05-18 DIAGNOSIS — Z95.0 PRESENCE OF CARDIAC PACEMAKER: ICD-10-CM

## 2023-05-18 PROCEDURE — 93294 CARDIAC DEVICE CHECK - REMOTE: ICD-10-PCS | Mod: ,,, | Performed by: INTERNAL MEDICINE

## 2023-05-18 PROCEDURE — 93296 REM INTERROG EVL PM/IDS: CPT | Performed by: INTERNAL MEDICINE

## 2023-05-18 PROCEDURE — 93294 REM INTERROG EVL PM/LDLS PM: CPT | Mod: ,,, | Performed by: INTERNAL MEDICINE

## 2023-05-23 ENCOUNTER — OFFICE VISIT (OUTPATIENT)
Dept: ELECTROPHYSIOLOGY | Facility: CLINIC | Age: 88
End: 2023-05-23
Payer: MEDICARE

## 2023-05-23 ENCOUNTER — CLINICAL SUPPORT (OUTPATIENT)
Dept: CARDIOLOGY | Facility: HOSPITAL | Age: 88
End: 2023-05-23
Attending: INTERNAL MEDICINE
Payer: MEDICARE

## 2023-05-23 ENCOUNTER — HOSPITAL ENCOUNTER (OUTPATIENT)
Dept: CARDIOLOGY | Facility: CLINIC | Age: 88
Discharge: HOME | End: 2023-05-23
Payer: MEDICARE

## 2023-05-23 VITALS
WEIGHT: 144.19 LBS | HEIGHT: 65 IN | SYSTOLIC BLOOD PRESSURE: 110 MMHG | BODY MASS INDEX: 24.02 KG/M2 | DIASTOLIC BLOOD PRESSURE: 60 MMHG

## 2023-05-23 DIAGNOSIS — Z95.0 CARDIAC PACEMAKER IN SITU: Primary | ICD-10-CM

## 2023-05-23 DIAGNOSIS — I48.0 PAF (PAROXYSMAL ATRIAL FIBRILLATION): ICD-10-CM

## 2023-05-23 DIAGNOSIS — I48.21 PERMANENT ATRIAL FIBRILLATION: ICD-10-CM

## 2023-05-23 DIAGNOSIS — I47.29 NONSUSTAINED VENTRICULAR TACHYCARDIA: ICD-10-CM

## 2023-05-23 DIAGNOSIS — R00.1 BRADYCARDIA: ICD-10-CM

## 2023-05-23 DIAGNOSIS — I50.22 CHRONIC HFREF (HEART FAILURE WITH REDUCED EJECTION FRACTION): ICD-10-CM

## 2023-05-23 PROCEDURE — 1100F PTFALLS ASSESS-DOCD GE2>/YR: CPT | Mod: HCNC,CPTII,S$GLB, | Performed by: NURSE PRACTITIONER

## 2023-05-23 PROCEDURE — 3288F FALL RISK ASSESSMENT DOCD: CPT | Mod: HCNC,CPTII,S$GLB, | Performed by: NURSE PRACTITIONER

## 2023-05-23 PROCEDURE — 99999 PR PBB SHADOW E&M-EST. PATIENT-LVL III: CPT | Mod: PBBFAC,HCNC,, | Performed by: NURSE PRACTITIONER

## 2023-05-23 PROCEDURE — 93000 ELECTROCARDIOGRAM COMPLETE: CPT | Mod: HCNC,77,S$GLB, | Performed by: INTERNAL MEDICINE

## 2023-05-23 PROCEDURE — 1126F AMNT PAIN NOTED NONE PRSNT: CPT | Mod: HCNC,CPTII,S$GLB, | Performed by: NURSE PRACTITIONER

## 2023-05-23 PROCEDURE — 1100F PR PT FALLS ASSESS DOC 2+ FALLS/FALL W/INJURY/YR: ICD-10-PCS | Mod: HCNC,CPTII,S$GLB, | Performed by: NURSE PRACTITIONER

## 2023-05-23 PROCEDURE — 1159F MED LIST DOCD IN RCRD: CPT | Mod: HCNC,CPTII,S$GLB, | Performed by: NURSE PRACTITIONER

## 2023-05-23 PROCEDURE — 3288F PR FALLS RISK ASSESSMENT DOCUMENTED: ICD-10-PCS | Mod: HCNC,CPTII,S$GLB, | Performed by: NURSE PRACTITIONER

## 2023-05-23 PROCEDURE — 93279 PRGRMG DEV EVAL PM/LDLS PM: CPT | Mod: 26,,, | Performed by: INTERNAL MEDICINE

## 2023-05-23 PROCEDURE — 99214 PR OFFICE/OUTPT VISIT, EST, LEVL IV, 30-39 MIN: ICD-10-PCS | Mod: HCNC,S$GLB,, | Performed by: NURSE PRACTITIONER

## 2023-05-23 PROCEDURE — 93279 PRGRMG DEV EVAL PM/LDLS PM: CPT | Mod: HCNC

## 2023-05-23 PROCEDURE — 93000 RHYTHM STRIP: ICD-10-PCS | Mod: HCNC,77,S$GLB, | Performed by: INTERNAL MEDICINE

## 2023-05-23 PROCEDURE — 1160F PR REVIEW ALL MEDS BY PRESCRIBER/CLIN PHARMACIST DOCUMENTED: ICD-10-PCS | Mod: HCNC,CPTII,S$GLB, | Performed by: NURSE PRACTITIONER

## 2023-05-23 PROCEDURE — 1126F PR PAIN SEVERITY QUANTIFIED, NO PAIN PRESENT: ICD-10-PCS | Mod: HCNC,CPTII,S$GLB, | Performed by: NURSE PRACTITIONER

## 2023-05-23 PROCEDURE — 1159F PR MEDICATION LIST DOCUMENTED IN MEDICAL RECORD: ICD-10-PCS | Mod: HCNC,CPTII,S$GLB, | Performed by: NURSE PRACTITIONER

## 2023-05-23 PROCEDURE — 99999 PR PBB SHADOW E&M-EST. PATIENT-LVL III: ICD-10-PCS | Mod: PBBFAC,HCNC,, | Performed by: NURSE PRACTITIONER

## 2023-05-23 PROCEDURE — 93010 RHYTHM STRIP: ICD-10-PCS | Mod: HCNC,S$GLB,, | Performed by: INTERNAL MEDICINE

## 2023-05-23 PROCEDURE — 1160F RVW MEDS BY RX/DR IN RCRD: CPT | Mod: HCNC,CPTII,S$GLB, | Performed by: NURSE PRACTITIONER

## 2023-05-23 PROCEDURE — 99214 OFFICE O/P EST MOD 30 MIN: CPT | Mod: HCNC,S$GLB,, | Performed by: NURSE PRACTITIONER

## 2023-05-23 PROCEDURE — 93010 ELECTROCARDIOGRAM REPORT: CPT | Mod: HCNC,S$GLB,, | Performed by: INTERNAL MEDICINE

## 2023-05-23 PROCEDURE — 93005 RHYTHM STRIP: ICD-10-PCS | Mod: HCNC,S$GLB,, | Performed by: INTERNAL MEDICINE

## 2023-05-23 PROCEDURE — 93279 CARDIAC DEVICE CHECK - IN CLINIC & HOSPITAL: ICD-10-PCS | Mod: 26,,, | Performed by: INTERNAL MEDICINE

## 2023-05-23 PROCEDURE — 93005 ELECTROCARDIOGRAM TRACING: CPT | Mod: HCNC,S$GLB,, | Performed by: INTERNAL MEDICINE

## 2023-07-06 ENCOUNTER — PES CALL (OUTPATIENT)
Dept: ADMINISTRATIVE | Facility: CLINIC | Age: 88
End: 2023-07-06
Payer: MEDICARE

## 2023-08-16 ENCOUNTER — CLINICAL SUPPORT (OUTPATIENT)
Dept: CARDIOLOGY | Facility: HOSPITAL | Age: 88
End: 2023-08-16
Attending: INTERNAL MEDICINE
Payer: MEDICARE

## 2023-08-16 DIAGNOSIS — Z95.0 PRESENCE OF CARDIAC PACEMAKER: ICD-10-CM

## 2023-08-16 PROCEDURE — 93296 REM INTERROG EVL PM/IDS: CPT | Mod: HCNC | Performed by: INTERNAL MEDICINE

## 2023-09-01 ENCOUNTER — DOCUMENT SCAN (OUTPATIENT)
Dept: HOME HEALTH SERVICES | Facility: HOSPITAL | Age: 88
End: 2023-09-01
Payer: MEDICARE

## 2023-09-08 ENCOUNTER — PATIENT MESSAGE (OUTPATIENT)
Dept: INTERNAL MEDICINE | Facility: CLINIC | Age: 88
End: 2023-09-08
Payer: MEDICARE

## 2023-09-08 RX ORDER — AMLODIPINE BESYLATE 5 MG/1
2.5 TABLET ORAL DAILY
Qty: 90 TABLET | Refills: 2 | Status: SHIPPED | OUTPATIENT
Start: 2023-09-08

## 2023-09-08 NOTE — TELEPHONE ENCOUNTER
Care Due:                  Date            Visit Type   Department     Provider  --------------------------------------------------------------------------------                                ESTABLISHED                              PATIENT -    [facility]  Last Visit: 04-      [facility]       Rogelio Oliver  Next Visit: None Scheduled  None         None Found                                                            Last  Test          Frequency    Reason                     Performed    Due Date  --------------------------------------------------------------------------------    CMP.........  12 months..  atorvastatin.............  12- 12-    Lipid Panel.  12 months..  atorvastatin.............  Not Found    Overdue    [employer] Embedded Care Due Messages. Reference number: [id number].   9/08/2023 7:25:42 AM CDT

## 2023-09-08 NOTE — TELEPHONE ENCOUNTER
Refill Routing Note     Refill Routing Note   Medication(s) are not appropriate for processing by [facility] for the following reason(s):      No active prescription written by provider    [facility] action(s):  Defer Care Due:  Labs due            Appointments  past 12m or future 3m with PCP    Date Provider   Last Visit   4/11/2023 Rogelio Oliver MD   Next Visit   Visit date not found Rogelio Oliver MD   ED visits in past 90 days: 0        Note composed:7:43 AM 09/08/2023

## 2023-09-18 NOTE — TELEPHONE ENCOUNTER
No care due was identified.  [facility] Embedded Care Due Messages. Reference number: [id number].   9/18/2023 6:14:56 PM CDT

## 2023-09-19 RX ORDER — FAMOTIDINE 20 MG/1
20 TABLET, FILM COATED ORAL NIGHTLY PRN
Qty: 90 TABLET | Refills: 1 | Status: SHIPPED | OUTPATIENT
Start: 2023-09-19 | End: 2024-02-27

## 2023-09-19 NOTE — TELEPHONE ENCOUNTER
Refill Decision Note   Nando Salguero  is requesting a refill authorization.  Brief Assessment and Rationale for Refill:  Approve     Medication Therapy Plan:         Comments:     Note composed:10:03 AM 09/19/2023

## 2023-10-09 DIAGNOSIS — E89.0 POSTOPERATIVE HYPOTHYROIDISM: ICD-10-CM

## 2023-10-09 NOTE — TELEPHONE ENCOUNTER
No care due was identified.  [facility] Embedded Care Due Messages. Reference number: [id number].   10/09/2023 5:22:18 PM CDT

## 2023-10-10 RX ORDER — LEVOTHYROXINE SODIUM 125 UG/1
125 TABLET ORAL EVERY MORNING
Qty: 90 TABLET | Refills: 1 | Status: SHIPPED | OUTPATIENT
Start: 2023-10-10 | End: 2024-02-27

## 2023-10-10 NOTE — TELEPHONE ENCOUNTER
Refill Decision Note   Nando Salguero  is requesting a refill authorization.  Brief Assessment and Rationale for Refill:  Approve     Medication Therapy Plan:         Comments:     Note composed:8:24 AM 10/10/2023             Appointments     Last Visit   4/11/2023 Rogelio Oliver MD   Next Visit   Visit date not found Rogelio Oliver MD

## 2023-10-13 NOTE — TELEPHONE ENCOUNTER
No care due was identified.  [facility] Embedded Care Due Messages. Reference number: [id number].   10/13/2023 3:59:18 PM CDT

## 2023-10-16 RX ORDER — OXYCODONE AND ACETAMINOPHEN 5; 325 MG/1; MG/1
1 TABLET ORAL EVERY 12 HOURS PRN
Qty: 60 TABLET | Refills: 0 | Status: SHIPPED | OUTPATIENT
Start: 2023-10-16 | End: 2024-01-04 | Stop reason: SDUPTHER

## 2023-11-14 ENCOUNTER — CLINICAL SUPPORT (OUTPATIENT)
Dept: CARDIOLOGY | Facility: HOSPITAL | Age: 88
End: 2023-11-14
Payer: MEDICARE

## 2023-11-14 DIAGNOSIS — I48.91 UNSPECIFIED ATRIAL FIBRILLATION: ICD-10-CM

## 2023-11-14 DIAGNOSIS — R00.1 BRADYCARDIA, UNSPECIFIED: ICD-10-CM

## 2023-11-14 DIAGNOSIS — Z95.0 PRESENCE OF CARDIAC PACEMAKER: ICD-10-CM

## 2023-11-14 PROCEDURE — 93294 REM INTERROG EVL PM/LDLS PM: CPT | Mod: HCNC,,, | Performed by: INTERNAL MEDICINE

## 2023-11-14 PROCEDURE — 93296 REM INTERROG EVL PM/IDS: CPT | Mod: HCNC | Performed by: INTERNAL MEDICINE

## 2023-11-14 PROCEDURE — 93294 CARDIAC DEVICE CHECK - REMOTE: ICD-10-PCS | Mod: HCNC,,, | Performed by: INTERNAL MEDICINE

## 2023-11-29 RX ORDER — HYDROXYZINE HYDROCHLORIDE 50 MG/1
50 TABLET, FILM COATED ORAL NIGHTLY
Qty: 90 TABLET | Refills: 2 | Status: SHIPPED | OUTPATIENT
Start: 2023-11-29

## 2024-01-01 ENCOUNTER — PATIENT MESSAGE (OUTPATIENT)
Dept: INTERNAL MEDICINE | Facility: CLINIC | Age: 89
End: 2024-01-01
Payer: MEDICARE

## 2024-01-01 DIAGNOSIS — R44.3 HALLUCINATIONS: Primary | ICD-10-CM

## 2024-01-01 DIAGNOSIS — R41.0 CONFUSION: ICD-10-CM

## 2024-01-01 DIAGNOSIS — R82.90 URINE ABNORMALITY: ICD-10-CM

## 2024-01-02 NOTE — TELEPHONE ENCOUNTER
Called robert (grandchild); no answer, left voice mail    Called Milly (dtr); scheduled pt for labs successfully

## 2024-01-04 ENCOUNTER — OFFICE VISIT (OUTPATIENT)
Dept: INTERNAL MEDICINE | Facility: CLINIC | Age: 89
End: 2024-01-04
Payer: MEDICARE

## 2024-01-04 VITALS
HEART RATE: 84 BPM | BODY MASS INDEX: 23.13 KG/M2 | DIASTOLIC BLOOD PRESSURE: 84 MMHG | SYSTOLIC BLOOD PRESSURE: 132 MMHG | WEIGHT: 139 LBS

## 2024-01-04 DIAGNOSIS — F02.B0 MODERATE LATE ONSET ALZHEIMER'S DEMENTIA, UNSPECIFIED WHETHER BEHAVIORAL, PSYCHOTIC, OR MOOD DISTURBANCE OR ANXIETY: Primary | ICD-10-CM

## 2024-01-04 DIAGNOSIS — E89.0 POSTOPERATIVE HYPOTHYROIDISM: ICD-10-CM

## 2024-01-04 DIAGNOSIS — M48.02 CERVICAL STENOSIS OF SPINE: ICD-10-CM

## 2024-01-04 DIAGNOSIS — R45.89 CRYING: ICD-10-CM

## 2024-01-04 DIAGNOSIS — M47.812 ARTHROPATHY OF CERVICAL FACET JOINT: ICD-10-CM

## 2024-01-04 DIAGNOSIS — F32.A DEPRESSION, UNSPECIFIED DEPRESSION TYPE: ICD-10-CM

## 2024-01-04 DIAGNOSIS — G30.9 ALZHEIMER'S DEMENTIA WITH ANXIETY, UNSPECIFIED DEMENTIA SEVERITY, UNSPECIFIED TIMING OF DEMENTIA ONSET: ICD-10-CM

## 2024-01-04 DIAGNOSIS — M46.92 INFLAMMATORY SPONDYLOPATHY OF CERVICAL REGION: ICD-10-CM

## 2024-01-04 DIAGNOSIS — G95.89 MYELOMALACIA OF CERVICAL CORD: ICD-10-CM

## 2024-01-04 DIAGNOSIS — R41.3 MEMORY LOSS: ICD-10-CM

## 2024-01-04 DIAGNOSIS — F02.84 ALZHEIMER'S DEMENTIA WITH ANXIETY, UNSPECIFIED DEMENTIA SEVERITY, UNSPECIFIED TIMING OF DEMENTIA ONSET: ICD-10-CM

## 2024-01-04 DIAGNOSIS — I50.22 CHRONIC HFREF (HEART FAILURE WITH REDUCED EJECTION FRACTION): ICD-10-CM

## 2024-01-04 DIAGNOSIS — G30.1 MODERATE LATE ONSET ALZHEIMER'S DEMENTIA, UNSPECIFIED WHETHER BEHAVIORAL, PSYCHOTIC, OR MOOD DISTURBANCE OR ANXIETY: Primary | ICD-10-CM

## 2024-01-04 DIAGNOSIS — I48.0 PAF (PAROXYSMAL ATRIAL FIBRILLATION): ICD-10-CM

## 2024-01-04 DIAGNOSIS — I70.0 AORTIC ATHEROSCLEROSIS: ICD-10-CM

## 2024-01-04 DIAGNOSIS — I20.9 CARDIAC ANGINA: ICD-10-CM

## 2024-01-04 PROCEDURE — 99214 OFFICE O/P EST MOD 30 MIN: CPT | Mod: HCNC,95,, | Performed by: INTERNAL MEDICINE

## 2024-01-04 PROCEDURE — 1159F MED LIST DOCD IN RCRD: CPT | Mod: HCNC,CPTII,95, | Performed by: INTERNAL MEDICINE

## 2024-01-04 PROCEDURE — 1160F RVW MEDS BY RX/DR IN RCRD: CPT | Mod: HCNC,CPTII,95, | Performed by: INTERNAL MEDICINE

## 2024-01-04 RX ORDER — OXYCODONE AND ACETAMINOPHEN 5; 325 MG/1; MG/1
1 TABLET ORAL EVERY 12 HOURS PRN
Qty: 60 TABLET | Refills: 0 | Status: SHIPPED | OUTPATIENT
Start: 2024-01-04

## 2024-01-04 RX ORDER — ESCITALOPRAM OXALATE 5 MG/1
5 TABLET ORAL DAILY
Qty: 30 TABLET | Refills: 11 | Status: SHIPPED | OUTPATIENT
Start: 2024-01-04 | End: 2025-01-03

## 2024-01-04 NOTE — Clinical Note
Pt had labs at an outside [facility] and they did not do the FT4 after the TSH. They should have blood from yesterday. Can that be added? I placed the order.

## 2024-01-04 NOTE — PROGRESS NOTES
Subjective:       Patient ID: Nando Salguero is a 88 y.o. male.    Chief Complaint: Follow-up      The patient location is: Home, [place]  The chief complaint leading to consultation is: Follow up, memory, crying, lab concerns    Visit type: audiovisual    Face to Face time with patient: 20 minutes  30 minutes of total time spent on the encounter, which includes face to face time and non-face to face time preparing to see the patient (eg, review of tests), Obtaining and/or reviewing separately obtained history, Documenting clinical information in the electronic or other health record, Independently interpreting results (not separately reported) and communicating results to the patient/family/caregiver, or Care coordination (not separately reported).         Each patient to whom he or she provides medical services by telemedicine is:  (1) informed of the relationship between the physician and patient and the respective role of any other health care provider with respect to management of the patient; and (2) notified that he or she may decline to receive medical services by telemedicine and may withdraw from such care at any time.    Notes:     Patient seen virtually with his wife and granddaughter, Dr Angelica Cheng.  They previously had requested labs in urine with concern over possible UTI or lab abnormality.  Patient has had some issues with fecal incontinence.  He has dementia, mild to moderate severity.  He has not wanted to see Neurology although last visit and again today I suggested my strong recommendation to have a formal neurology evaluation.  The patient's granddaughter is a local physician and she and other family members and other assistance have been able to check in with the patient.  He still has severe neck pain but they give his pain med sparingly, actually infrequently.  They no longer allow him to drive but supervise him with some physical activities.  At times he gets upset and may even cry.  The  family thinks he may be depressed.  They have made sure that he can not harm himself either with the car or with any guns knives weapons etc..  Not that they expect that but because they are concerned about his memory.  The patient states he does not want to see Neurology despite me trying to convince him otherwise.  The family seems hesitant to take him even though I have recommended it.  His labs were surprisingly good.  TSH did go from 4-8 and the lab did not reflex a free T4.  I have placed an order hoping to get it and we will see.  I think that would be helpful to know if any of his symptoms of potential fatigue or even depression could relate to thyroid but we will also consider an antidepressant.   reviewed.  Cautioned on side effects of pain medication.  They assured me he gets it sparingly and in only small amounts      Review of Systems   Constitutional:  Positive for activity change. Negative for unexpected weight change.   HENT:  Positive for rhinorrhea. Negative for hearing loss and trouble swallowing.    Eyes:  Negative for discharge and visual disturbance.   Respiratory:  Negative for chest tightness and wheezing.    Cardiovascular:  Negative for chest pain and palpitations.   Gastrointestinal:  Positive for blood in stool (hemorrhoid) and constipation. Negative for diarrhea and vomiting.   Endocrine: Negative for polydipsia and polyuria.   Genitourinary:  Positive for difficulty urinating. Negative for hematuria and urgency.   Musculoskeletal:  Positive for neck pain and neck stiffness. Negative for arthralgias and joint swelling.   Neurological:  Positive for weakness. Negative for headaches.   Psychiatric/Behavioral:  Positive for confusion. Negative for dysphoric mood.          Objective:      Physical Exam  Constitutional:       Comments: Answers questions   Pulmonary:      Effort: No respiratory distress.   Neurological:      Mental Status: He is alert.   Psychiatric:         Mood and  "Affect: Mood normal.         Behavior: Behavior normal.         Assessment:       Problem List Items Addressed This Visit          Neuro    Myelomalacia of cervical cord    Cervical stenosis of spine    Arthropathy of cervical facet joint    Alzheimer's dementia - Primary       Endocrine    Postoperative hypothyroidism    Relevant Orders    T4, FREE     Other Visit Diagnoses       Memory loss        Relevant Orders    Ambulatory referral/consult to Neurology    Crying        Depression, unspecified depression type                Plan:       Nando was seen today for follow-up.    Diagnoses and all orders for this visit:    Moderate late onset Alzheimer's dementia, unspecified whether behavioral, psychotic, or mood disturbance or anxiety    Postoperative hypothyroidism  -     T4, FREE; Future    Memory loss  -     Ambulatory referral/consult to Neurology; Future    Crying    Depression, unspecified depression type    Cervical stenosis of spine    Myelomalacia of cervical cord    Arthropathy of cervical facet joint    Other orders  -     EScitalopram oxalate (LEXAPRO) 5 MG Tab; Take 1 tablet (5 mg total) by mouth once daily.  -     oxyCODONE-acetaminophen (PERCOCET) 5-325 mg per tablet; Take 1 tablet by mouth every 12 (twelve) hours as needed for Pain.         Try to get free T4.  Update medicine if needed   Lexapro to see if that helps mood.  Strongly consider Neurology consultation for their assistance with degree, severity of dementia, memory issues etc.   reviewed          Portions of this note may have been created with voice recognition software. Occasional "wrong-word" or "sound-a-like" substitutions may have occurred due to the inherent limitations of voice recognition software. Please, read the note carefully and recognize, using context, where substitutions have occurred.  "

## 2024-01-05 ENCOUNTER — PATIENT MESSAGE (OUTPATIENT)
Dept: INTERNAL MEDICINE | Facility: CLINIC | Age: 89
End: 2024-01-05
Payer: MEDICARE

## 2024-01-24 DIAGNOSIS — I10 HYPERTENSION, BENIGN: ICD-10-CM

## 2024-01-24 DIAGNOSIS — I25.10 CORONARY ARTERY DISEASE INVOLVING NATIVE CORONARY ARTERY OF NATIVE HEART WITHOUT ANGINA PECTORIS: ICD-10-CM

## 2024-01-24 DIAGNOSIS — E78.2 MIXED HYPERLIPIDEMIA: ICD-10-CM

## 2024-01-24 RX ORDER — ATORVASTATIN CALCIUM 40 MG/1
40 TABLET, FILM COATED ORAL
Qty: 90 TABLET | Refills: 0 | Status: SHIPPED | OUTPATIENT
Start: 2024-01-24 | End: 2024-02-27

## 2024-01-24 NOTE — TELEPHONE ENCOUNTER
Care Due:                  Date            Visit Type   Department     Provider  --------------------------------------------------------------------------------                                ESTABLISHED                              PATIENT -    [facility]  Last Visit: 01-      [facility]       Rogelio Oliver  Next Visit: None Scheduled  None         None Found                                                            Last  Test          Frequency    Reason                     Performed    Due Date  --------------------------------------------------------------------------------    Lipid Panel.  12 months..  atorvastatin.............  Not Found    Overdue    [employer] Embedded Care Due Messages. Reference number: [id number].   1/24/2024 12:30:47 AM CST

## 2024-01-24 NOTE — TELEPHONE ENCOUNTER
Refill Routing Note   Medication(s) are not appropriate for processing by [facility] for the following reason(s):        Required labs outdated    [facility] action(s):  Defer     Requires labs : Yes             Appointments  past 12m or future 3m with PCP    Date Provider   Last Visit   1/4/2024 Rogelio Oliver MD   Next Visit   Visit date not found Rogelio Oliver MD   ED visits in past 90 days: 0        Note composed:10:18 AM 01/24/2024

## 2024-02-13 ENCOUNTER — CLINICAL SUPPORT (OUTPATIENT)
Dept: CARDIOLOGY | Facility: HOSPITAL | Age: 89
End: 2024-02-13
Attending: INTERNAL MEDICINE
Payer: MEDICARE

## 2024-02-13 ENCOUNTER — CLINICAL SUPPORT (OUTPATIENT)
Dept: CARDIOLOGY | Facility: HOSPITAL | Age: 89
End: 2024-02-13
Payer: MEDICARE

## 2024-02-13 DIAGNOSIS — Z95.0 PRESENCE OF CARDIAC PACEMAKER: ICD-10-CM

## 2024-02-13 PROCEDURE — 93294 REM INTERROG EVL PM/LDLS PM: CPT | Mod: ,,, | Performed by: INTERNAL MEDICINE

## 2024-02-13 PROCEDURE — 93296 REM INTERROG EVL PM/IDS: CPT | Performed by: INTERNAL MEDICINE

## 2024-02-21 LAB
OHS CV DC REMOTE DEVICE TYPE: NORMAL
OHS CV RV PACING PERCENT: 43 %

## 2024-02-27 DIAGNOSIS — I25.10 CORONARY ARTERY DISEASE INVOLVING NATIVE CORONARY ARTERY OF NATIVE HEART WITHOUT ANGINA PECTORIS: ICD-10-CM

## 2024-02-27 DIAGNOSIS — I10 HYPERTENSION, BENIGN: ICD-10-CM

## 2024-02-27 DIAGNOSIS — E89.0 POSTOPERATIVE HYPOTHYROIDISM: ICD-10-CM

## 2024-02-27 DIAGNOSIS — E78.2 MIXED HYPERLIPIDEMIA: ICD-10-CM

## 2024-02-27 RX ORDER — FAMOTIDINE 20 MG/1
TABLET, FILM COATED ORAL
Qty: 90 TABLET | Refills: 3 | Status: SHIPPED | OUTPATIENT
Start: 2024-02-27

## 2024-02-27 RX ORDER — ATORVASTATIN CALCIUM 40 MG/1
40 TABLET, FILM COATED ORAL
Qty: 90 TABLET | Refills: 0 | Status: SHIPPED | OUTPATIENT
Start: 2024-02-27

## 2024-02-27 RX ORDER — METOPROLOL SUCCINATE 25 MG/1
25 TABLET, EXTENDED RELEASE ORAL
Qty: 90 TABLET | Refills: 3 | Status: SHIPPED | OUTPATIENT
Start: 2024-02-27

## 2024-02-27 RX ORDER — LEVOTHYROXINE SODIUM 125 UG/1
125 TABLET ORAL EVERY MORNING
Qty: 90 TABLET | Refills: 1 | Status: SHIPPED | OUTPATIENT
Start: 2024-02-27

## 2024-02-27 NOTE — TELEPHONE ENCOUNTER
Refill Routing Note   Medication(s) are not appropriate for processing by [facility] for the following reason(s):        Required labs abnormal: TSH  Required labs outdated: lipid panel    [facility] action(s):  Approve  Defer               Appointments  past 12m or future 3m with PCP    Date Provider   Last Visit   1/4/2024 Rogelio Oliver MD   Next Visit   Visit date not found Rogelio Oliver MD   ED visits in past 90 days: 0        Note composed:12:46 PM 02/27/2024

## 2024-02-27 NOTE — TELEPHONE ENCOUNTER
No care due was identified.  [facility] Embedded Care Due Messages. Reference number: [id number].   2/27/2024 12:34:53 AM CST

## 2024-02-27 NOTE — TELEPHONE ENCOUNTER
Refill Decision Note   Nando Salguero  is requesting a refill authorization.  Brief Assessment and Rationale for Refill:  Approve     Medication Therapy Plan:         Comments:     Note composed:3:30 AM 02/27/2024

## 2024-02-27 NOTE — TELEPHONE ENCOUNTER
----- Message from Rupal Lamb sent at 5/18/2017  9:27 AM CDT -----  Contact: Wife/Robina/[phone]  Doctor appointment and lab have been scheduled.  Please link lab orders to the lab appointment.  Date of doctor appointment:  06/20/17  Physical or EP:  Physical  Date of lab appointment: A week after his doctors appointment  Comments: Patient wife called want to know if lab orders can be add for patient physical.  . Please call and advise.        Thank you!!!       no

## 2024-02-27 NOTE — TELEPHONE ENCOUNTER
No care due was identified.  [facility] Embedded Care Due Messages. Reference number: [id number].   2/27/2024 12:08:00 PM CST

## 2024-04-02 ENCOUNTER — PATIENT MESSAGE (OUTPATIENT)
Dept: INTERNAL MEDICINE | Facility: CLINIC | Age: 89
End: 2024-04-02
Payer: MEDICARE

## 2024-04-02 DIAGNOSIS — E89.0 POSTOPERATIVE HYPOTHYROIDISM: Primary | ICD-10-CM

## 2024-04-02 NOTE — TELEPHONE ENCOUNTER
Care Due:                  Date            Visit Type   Department     Provider  --------------------------------------------------------------------------------                                ESTABLISHED                              PATIENT -    [facility]  Last Visit: 01-      [facility]       Rogelio Oliver  Next Visit: None Scheduled  None         None Found                                                            Last  Test          Frequency    Reason                     Performed    Due Date  --------------------------------------------------------------------------------    Lipid Panel.  12 months..  atorvastatin.............  Not Found    Overdue    [employer] Embedded Care Due Messages. Reference number: [id number].   4/02/2024 5:00:33 PM CDT

## 2024-04-03 RX ORDER — OXYCODONE AND ACETAMINOPHEN 5; 325 MG/1; MG/1
1 TABLET ORAL EVERY 12 HOURS PRN
Qty: 60 TABLET | Refills: 0 | Status: SHIPPED | OUTPATIENT
Start: 2024-04-03

## 2024-05-14 ENCOUNTER — CLINICAL SUPPORT (OUTPATIENT)
Dept: CARDIOLOGY | Facility: HOSPITAL | Age: 89
End: 2024-05-14
Attending: INTERNAL MEDICINE
Payer: MEDICARE

## 2024-05-14 ENCOUNTER — CLINICAL SUPPORT (OUTPATIENT)
Dept: CARDIOLOGY | Facility: HOSPITAL | Age: 89
End: 2024-05-14
Payer: MEDICARE

## 2024-05-14 DIAGNOSIS — Z95.0 PRESENCE OF CARDIAC PACEMAKER: ICD-10-CM

## 2024-05-14 PROCEDURE — 93294 REM INTERROG EVL PM/LDLS PM: CPT | Mod: ,,, | Performed by: INTERNAL MEDICINE

## 2024-05-14 PROCEDURE — 93296 REM INTERROG EVL PM/IDS: CPT | Performed by: INTERNAL MEDICINE

## 2024-05-30 LAB
OHS CV DC REMOTE DEVICE TYPE: NORMAL
OHS CV ICD SHOCK: NO
OHS CV RV PACING PERCENT: 44 %

## 2024-06-10 ENCOUNTER — PATIENT MESSAGE (OUTPATIENT)
Dept: INTERNAL MEDICINE | Facility: CLINIC | Age: 89
End: 2024-06-10
Payer: MEDICARE

## 2024-07-16 DIAGNOSIS — I10 HYPERTENSION, BENIGN: ICD-10-CM

## 2024-07-16 DIAGNOSIS — I25.10 CORONARY ARTERY DISEASE INVOLVING NATIVE CORONARY ARTERY OF NATIVE HEART WITHOUT ANGINA PECTORIS: ICD-10-CM

## 2024-07-16 DIAGNOSIS — E78.2 MIXED HYPERLIPIDEMIA: ICD-10-CM

## 2024-07-16 RX ORDER — ATORVASTATIN CALCIUM 40 MG/1
40 TABLET, FILM COATED ORAL
Qty: 90 TABLET | Refills: 0 | Status: SHIPPED | OUTPATIENT
Start: 2024-07-16

## 2024-07-16 NOTE — TELEPHONE ENCOUNTER
Care Due:                  Date            Visit Type   Department     Provider  --------------------------------------------------------------------------------                                ESTABLISHED                              PATIENT -    [facility]  Last Visit: 01-      [facility]       Rogelio Oliver  Next Visit: None Scheduled  None         None Found                                                            Last  Test          Frequency    Reason                     Performed    Due Date  --------------------------------------------------------------------------------    Lipid Panel.  12 months..  atorvastatin.............  12- 12-    [facility] Embedded Care Due Messages. Reference number: [id number].   7/16/2024 12:46:19 AM CDT

## 2024-07-16 NOTE — TELEPHONE ENCOUNTER
Refill Routing Note   Medication(s) are not appropriate for processing by [facility] for the following reason(s):        Required labs outdated    [facility] action(s):  Defer     Requires labs : Yes             Appointments  past 12m or future 3m with PCP    Date Provider   Last Visit   1/4/2024 Rogelio Oliver MD   Next Visit   Visit date not found Rogelio Oliver MD   ED visits in past 90 days: 0        Note composed:3:32 AM 07/16/2024

## 2024-07-19 RX ORDER — HYDROXYZINE HYDROCHLORIDE 50 MG/1
50 TABLET, FILM COATED ORAL NIGHTLY
Qty: 90 TABLET | Refills: 2 | Status: SHIPPED | OUTPATIENT
Start: 2024-07-19

## 2024-08-10 ENCOUNTER — PATIENT MESSAGE (OUTPATIENT)
Dept: INTERNAL MEDICINE | Facility: CLINIC | Age: 89
End: 2024-08-10
Payer: MEDICARE

## 2024-08-10 DIAGNOSIS — G30.9 ALZHEIMER'S DEMENTIA WITH ANXIETY, UNSPECIFIED DEMENTIA SEVERITY, UNSPECIFIED TIMING OF DEMENTIA ONSET: Primary | ICD-10-CM

## 2024-08-10 DIAGNOSIS — F02.84 ALZHEIMER'S DEMENTIA WITH ANXIETY, UNSPECIFIED DEMENTIA SEVERITY, UNSPECIFIED TIMING OF DEMENTIA ONSET: Primary | ICD-10-CM

## 2024-08-13 ENCOUNTER — CLINICAL SUPPORT (OUTPATIENT)
Dept: CARDIOLOGY | Facility: HOSPITAL | Age: 89
End: 2024-08-13
Payer: MEDICARE

## 2024-08-13 ENCOUNTER — CLINICAL SUPPORT (OUTPATIENT)
Dept: CARDIOLOGY | Facility: HOSPITAL | Age: 89
End: 2024-08-13
Attending: INTERNAL MEDICINE
Payer: MEDICARE

## 2024-08-13 DIAGNOSIS — Z95.0 PRESENCE OF CARDIAC PACEMAKER: ICD-10-CM

## 2024-08-13 PROCEDURE — 93296 REM INTERROG EVL PM/IDS: CPT | Performed by: INTERNAL MEDICINE

## 2024-08-13 PROCEDURE — 93294 REM INTERROG EVL PM/LDLS PM: CPT | Mod: ,,, | Performed by: INTERNAL MEDICINE

## 2024-08-20 NOTE — TELEPHONE ENCOUNTER
No care due was identified.  [facility] Embedded Care Due Messages. Reference number: [id number].   8/20/2024 6:52:03 PM CDT

## 2024-08-21 RX ORDER — OXYCODONE AND ACETAMINOPHEN 5; 325 MG/1; MG/1
1 TABLET ORAL EVERY 12 HOURS PRN
Qty: 60 TABLET | Refills: 0 | Status: SHIPPED | OUTPATIENT
Start: 2024-08-21

## 2024-08-29 LAB
OHS CV DC REMOTE DEVICE TYPE: NORMAL
OHS CV RV PACING PERCENT: 45 %

## 2024-09-21 DIAGNOSIS — E89.0 POSTOPERATIVE HYPOTHYROIDISM: ICD-10-CM

## 2024-09-21 RX ORDER — LEVOTHYROXINE SODIUM 125 UG/1
125 TABLET ORAL
Qty: 90 TABLET | Refills: 1 | Status: SHIPPED | OUTPATIENT
Start: 2024-09-21

## 2024-09-21 NOTE — TELEPHONE ENCOUNTER
Provider Staff:  Action required for this patient    Requires labs      Please see care gap opportunities below in Care Due Message.    Thanks!  [facility]     Appointments      Date Provider   Last Visit   Visit date not found Rogelio Oliver MD   Next Visit   Visit date not found Rogelio Oliver MD     Refill Decision Note   Nando Salguero  is requesting a refill authorization.  Brief Assessment and Rationale for Refill:  Approve     Medication Therapy Plan:         Comments:     Note composed:12:32 PM 09/21/2024

## 2024-09-21 NOTE — TELEPHONE ENCOUNTER
Care Due:                  Date            Visit Type   Department     Provider  --------------------------------------------------------------------------------                                ESTABLISHED                              PATIENT -    [facility]  Last Visit: 01-      [facility]       Rogelio Oliver  Next Visit: None Scheduled  None         None Found                                                            Last  Test          Frequency    Reason                     Performed    Due Date  --------------------------------------------------------------------------------    Lipid Panel.  12 months..  atorvastatin.............  12- 12-    [facility] Embedded Care Due Messages. Reference number: [id number].   9/21/2024 12:53:35 AM CDT

## 2024-10-10 DIAGNOSIS — E78.2 MIXED HYPERLIPIDEMIA: ICD-10-CM

## 2024-10-10 DIAGNOSIS — I25.10 CORONARY ARTERY DISEASE INVOLVING NATIVE CORONARY ARTERY OF NATIVE HEART WITHOUT ANGINA PECTORIS: ICD-10-CM

## 2024-10-10 DIAGNOSIS — I10 HYPERTENSION, BENIGN: ICD-10-CM

## 2024-10-10 RX ORDER — ATORVASTATIN CALCIUM 40 MG/1
40 TABLET, FILM COATED ORAL
Qty: 90 TABLET | Refills: 0 | Status: SHIPPED | OUTPATIENT
Start: 2024-10-10

## 2024-10-10 NOTE — TELEPHONE ENCOUNTER
Care Due:                  Date            Visit Type   Department     Provider  --------------------------------------------------------------------------------                                ESTABLISHED                              PATIENT -    [facility]  Last Visit: 01-      [facility]       Rogelio Oliver  Next Visit: None Scheduled  None         None Found                                                            Last  Test          Frequency    Reason                     Performed    Due Date  --------------------------------------------------------------------------------    CMP.........  12 months..  atorvastatin.............  01- 12-    [facility] Embedded Care Due Messages. Reference number: [id number].   10/10/2024 1:52:16 AM CDT

## 2024-10-10 NOTE — TELEPHONE ENCOUNTER
Refill Routing Note   Medication(s) are not appropriate for processing by [facility] for the following reason(s):        Required labs outdated    [facility] action(s):  Defer   Requires labs : Yes             Appointments  past 12m or future 3m with PCP    Date Provider   Last Visit   1/4/2024 Rogelio Oliver MD   Next Visit   Visit date not found Rogelio Oliver MD   ED visits in past 90 days: 0        Note composed:12:05 PM 10/10/2024

## 2024-10-21 ENCOUNTER — PATIENT MESSAGE (OUTPATIENT)
Dept: INTERNAL MEDICINE | Facility: CLINIC | Age: 89
End: 2024-10-21
Payer: MEDICARE

## 2024-10-21 DIAGNOSIS — F02.84 ALZHEIMER'S DEMENTIA WITH ANXIETY, UNSPECIFIED DEMENTIA SEVERITY, UNSPECIFIED TIMING OF DEMENTIA ONSET: Primary | ICD-10-CM

## 2024-10-21 DIAGNOSIS — F32.A DEPRESSION, UNSPECIFIED DEPRESSION TYPE: ICD-10-CM

## 2024-10-21 DIAGNOSIS — G30.9 ALZHEIMER'S DEMENTIA WITH ANXIETY, UNSPECIFIED DEMENTIA SEVERITY, UNSPECIFIED TIMING OF DEMENTIA ONSET: Primary | ICD-10-CM

## 2024-10-23 ENCOUNTER — TELEPHONE (OUTPATIENT)
Dept: INTERNAL MEDICINE | Facility: CLINIC | Age: 89
End: 2024-10-23
Payer: MEDICARE

## 2024-10-23 PROCEDURE — G0180 MD CERTIFICATION HHA PATIENT: HCPCS | Mod: ,,, | Performed by: INTERNAL MEDICINE

## 2024-10-23 NOTE — TELEPHONE ENCOUNTER
----- Message from Bolamarcello sent at 10/22/2024  3:28 PM CDT -----  Contact: @ [facility] [phone]  Would like to receive medical advice.    Would they like a call back or a response via [facility]:  call back    Additional information:  Calling to speak with the office about getting an a face to face office visit note. Fax # is [phone].

## 2024-11-12 ENCOUNTER — CLINICAL SUPPORT (OUTPATIENT)
Dept: CARDIOLOGY | Facility: HOSPITAL | Age: 89
End: 2024-11-12
Payer: MEDICARE

## 2024-11-12 ENCOUNTER — CLINICAL SUPPORT (OUTPATIENT)
Dept: CARDIOLOGY | Facility: HOSPITAL | Age: 89
End: 2024-11-12
Attending: INTERNAL MEDICINE
Payer: MEDICARE

## 2024-11-12 DIAGNOSIS — Z95.0 PRESENCE OF CARDIAC PACEMAKER: ICD-10-CM

## 2024-11-12 PROCEDURE — 93296 REM INTERROG EVL PM/IDS: CPT | Performed by: INTERNAL MEDICINE

## 2024-11-12 PROCEDURE — 93294 REM INTERROG EVL PM/LDLS PM: CPT | Mod: ,,, | Performed by: INTERNAL MEDICINE

## 2024-11-13 ENCOUNTER — TELEPHONE (OUTPATIENT)
Dept: INTERNAL MEDICINE | Facility: CLINIC | Age: 89
End: 2024-11-13
Payer: MEDICARE

## 2024-11-13 DIAGNOSIS — M48.02 CERVICAL STENOSIS OF SPINE: ICD-10-CM

## 2024-11-13 DIAGNOSIS — F02.84 ALZHEIMER'S DEMENTIA WITH ANXIETY, UNSPECIFIED DEMENTIA SEVERITY, UNSPECIFIED TIMING OF DEMENTIA ONSET: Primary | ICD-10-CM

## 2024-11-13 DIAGNOSIS — G30.9 ALZHEIMER'S DEMENTIA WITH ANXIETY, UNSPECIFIED DEMENTIA SEVERITY, UNSPECIFIED TIMING OF DEMENTIA ONSET: Primary | ICD-10-CM

## 2024-11-13 DIAGNOSIS — I50.22 CHRONIC HFREF (HEART FAILURE WITH REDUCED EJECTION FRACTION): ICD-10-CM

## 2024-11-13 NOTE — TELEPHONE ENCOUNTER
----- Message from Karol sent at 11/12/2024  3:53 PM CST -----  Contact: [phone] Sherry Urban with [employer]   Diabetic or Medical Supplies.  What supplies are needed: transport wheelchair  What is the brand name of the supplies: N/A  Is this a refill or new prescription:  new  Who prescribed the supplies:  Dr MANDEEP Oliver  Pharmacy or company name, phone # and location: Sherry with  [facility] fax   Would the patient like a call back, or a response through their [facility] portal?:   call back to Sherry   Comments:

## 2024-11-13 NOTE — TELEPHONE ENCOUNTER
----- Message from Zohreh sent at 11/13/2024 11:45 AM CST -----  Contact: Patient  [phone]  Diabetic or Medical Supplies.  What supplies are needed:   What is the brand name of the supplies:   Is this a refill or new prescription:    Who prescribed the supplies:    Pharmacy or company name, phone # and location:    Would the patient like a call back, or a response through their [facility] portal?:     Comments:  MARY reached out regarding wheelchair order placed 11/13/24.       SADLY, PATIENTS INSURANCE DOES NOT WORK WITH [facility]

## 2024-11-13 NOTE — TELEPHONE ENCOUNTER
Spoke to #[phone] Sherry Urban with [employer]   And informed----she will fax order to a different company and let patient know

## 2024-11-14 ENCOUNTER — TELEPHONE (OUTPATIENT)
Dept: INTERNAL MEDICINE | Facility: CLINIC | Age: 89
End: 2024-11-14
Payer: MEDICARE

## 2024-11-14 RX ORDER — AMLODIPINE BESYLATE 5 MG/1
2.5 TABLET ORAL
Qty: 45 TABLET | Refills: 0 | Status: SHIPPED | OUTPATIENT
Start: 2024-11-14

## 2024-11-14 NOTE — TELEPHONE ENCOUNTER
Spoke to Sherry and wanted to let us know that patient had a fall last night walking to chair---no injuries

## 2024-11-14 NOTE — TELEPHONE ENCOUNTER
Refill Decision Note   Nando Salguero  is requesting a refill authorization.  Brief Assessment and Rationale for Refill:  Approve     Medication Therapy Plan:         Comments:     Note composed:3:50 AM 11/14/2024

## 2024-11-14 NOTE — TELEPHONE ENCOUNTER
----- Message from Martha sent at 11/14/2024  1:57 PM CST -----  Contact: ROSELIA Urban @ [employer] [phone]  Would like to receive medical advice.    Would they like a call back or a response via [facility]:  call back    Additional information:  Roselia Urban at  [employer] is calling to give the provider information that the pt had a fall today without injury. Please call Roselia westbrook for advice

## 2024-11-14 NOTE — TELEPHONE ENCOUNTER
No care due was identified.  [facility] Embedded Care Due Messages. Reference number: [id number].   11/14/2024 12:15:44 AM CST

## 2024-11-22 LAB
OHS CV DC REMOTE DEVICE TYPE: NORMAL
OHS CV ICD SHOCK: NO
OHS CV RV PACING PERCENT: 44 %

## 2024-11-26 ENCOUNTER — PATIENT MESSAGE (OUTPATIENT)
Dept: INTERNAL MEDICINE | Facility: CLINIC | Age: 89
End: 2024-11-26
Payer: MEDICARE

## 2024-11-27 ENCOUNTER — TELEPHONE (OUTPATIENT)
Dept: INTERNAL MEDICINE | Facility: CLINIC | Age: 89
End: 2024-11-27
Payer: MEDICARE

## 2024-11-27 NOTE — TELEPHONE ENCOUNTER
Spoke to Sherry and form was faxed to wrong number---Sherry re faxed form----received, signed and faxed back

## 2024-12-05 ENCOUNTER — TELEPHONE (OUTPATIENT)
Dept: INTERNAL MEDICINE | Facility: CLINIC | Age: 89
End: 2024-12-05
Payer: MEDICARE

## 2024-12-05 NOTE — TELEPHONE ENCOUNTER
----- Message from Jonnathan sent at 12/5/2024 12:21 PM CST -----  Contact: [facility] Janina [phone]  [email or web address]     Patient is calling for Medical Advice regarding: Janina needs a call back about when pt was seen     How long has patient had these symptoms:    Pharmacy name and phone#:    Patient wants a call back or thru [facility]:    Comments:    Please advise patient replies from provider may take up to 48 hours.

## 2024-12-05 NOTE — TELEPHONE ENCOUNTER
[facility] and [facility] need updated [facility] notes ---last office note was Jan. 2024---Scheduled virtual visit with Dr Oliver

## 2024-12-17 ENCOUNTER — TELEPHONE (OUTPATIENT)
Dept: INTERNAL MEDICINE | Facility: CLINIC | Age: 89
End: 2024-12-17

## 2024-12-17 ENCOUNTER — OFFICE VISIT (OUTPATIENT)
Dept: INTERNAL MEDICINE | Facility: CLINIC | Age: 89
End: 2024-12-17
Payer: MEDICARE

## 2024-12-17 DIAGNOSIS — G30.9 ALZHEIMER'S DEMENTIA WITH ANXIETY, UNSPECIFIED DEMENTIA SEVERITY, UNSPECIFIED TIMING OF DEMENTIA ONSET: Primary | ICD-10-CM

## 2024-12-17 DIAGNOSIS — I25.10 CORONARY ARTERY DISEASE INVOLVING NATIVE CORONARY ARTERY OF NATIVE HEART WITHOUT ANGINA PECTORIS: ICD-10-CM

## 2024-12-17 DIAGNOSIS — E89.0 POSTOPERATIVE HYPOTHYROIDISM: ICD-10-CM

## 2024-12-17 DIAGNOSIS — I10 HYPERTENSION, BENIGN: ICD-10-CM

## 2024-12-17 DIAGNOSIS — R39.15 URINARY URGENCY: ICD-10-CM

## 2024-12-17 DIAGNOSIS — F02.84 ALZHEIMER'S DEMENTIA WITH ANXIETY, UNSPECIFIED DEMENTIA SEVERITY, UNSPECIFIED TIMING OF DEMENTIA ONSET: Primary | ICD-10-CM

## 2024-12-17 PROCEDURE — 1100F PTFALLS ASSESS-DOCD GE2>/YR: CPT | Mod: HCNC,CPTII,95, | Performed by: INTERNAL MEDICINE

## 2024-12-17 PROCEDURE — 1159F MED LIST DOCD IN RCRD: CPT | Mod: HCNC,CPTII,95, | Performed by: INTERNAL MEDICINE

## 2024-12-17 PROCEDURE — 1160F RVW MEDS BY RX/DR IN RCRD: CPT | Mod: HCNC,CPTII,95, | Performed by: INTERNAL MEDICINE

## 2024-12-17 PROCEDURE — 1126F AMNT PAIN NOTED NONE PRSNT: CPT | Mod: HCNC,CPTII,95, | Performed by: INTERNAL MEDICINE

## 2024-12-17 PROCEDURE — 3288F FALL RISK ASSESSMENT DOCD: CPT | Mod: HCNC,CPTII,95, | Performed by: INTERNAL MEDICINE

## 2024-12-17 PROCEDURE — 99214 OFFICE O/P EST MOD 30 MIN: CPT | Mod: HCNC,95,, | Performed by: INTERNAL MEDICINE

## 2024-12-17 RX ORDER — NITROGLYCERIN 0.4 MG/1
0.4 TABLET SUBLINGUAL EVERY 5 MIN PRN
Qty: 25 TABLET | Refills: 3 | Status: SHIPPED | OUTPATIENT
Start: 2024-12-17

## 2024-12-17 RX ORDER — LIDOCAINE AND PRILOCAINE 25; 25 MG/G; MG/G
CREAM TOPICAL DAILY PRN
Qty: 30 G | Refills: 5 | Status: SHIPPED | OUTPATIENT
Start: 2024-12-17

## 2024-12-17 NOTE — TELEPHONE ENCOUNTER
Spoke to Elvira #[phone] at "[employer] and gave orders for BMP, TSH and urine culture---she will go out to collect---drops off specimen to [facility]---She also states she will fax results as well

## 2024-12-17 NOTE — PROGRESS NOTES
Patient ID: Nando Salguero is a 89 y.o. male.    Chief Complaint: Home Health update. Needed and discussed since 10/21/2024. Seen virtually.     The patient initiated a request through [employer] on 12/17/2024 for evaluation and management with a chief complaint of Multiple complaints.      The patient location is: [place]  The chief complaint leading to consultation is: Med follow up.     Visit type: audiovisual    Face to Face time with patient: 15  20 minutes of total time spent on the encounter, which includes face to face time and non-face to face time preparing to see the patient (eg, review of tests), Obtaining and/or reviewing separately obtained history, Documenting clinical information in the electronic or other health record, Independently interpreting results (not separately reported) and communicating results to the patient/family/caregiver, or Care coordination (not separately reported).         Each patient to whom he or she provides medical services by telemedicine is:  (1) informed of the relationship between the physician and patient and the respective role of any other health care provider with respect to management of the patient; and (2) notified that he or she may decline to receive medical services by telemedicine and may withdraw from such care at any time.    Notes:      History of Present Illness    CHIEF COMPLAINT:  Nando presents for a follow-up visit to address ongoing home health care needs and to discuss recent changes in his health status.    HPI:  We already of the visit conducted with the patient and his granddaughter who is a physician, Dr Angelica Cheng.  Nando has been receiving ongoing home health care for wounds on his right upper extremity. Recently, the first layer of skin was removed from these wounds. A previous sacral wound has completely healed. Nando has incontinence, particularly at night, necessitating the use of adult underwear. Grandmother reports urinary dribbling,  raising concerns about a possible urinary tract infection.    Nando's ability to perform activities of daily living (ADLs) has declined significantly. He can no longer dress or bathe himself, and while he can feed himself, it is described as messy. His mobility has become inconsistent, with some days allowing him to use the bathroom independently, while on other days, there are concerns about fall risk.    Sleep patterns have improved recently with the establishment of a consistent routine. Nando now sleeps through the night, waking up once or twice at most. This improvement occurred after addressing a previous issue where his day and night schedules were reversed.    Nando recently had COVID-19, with symptoms beginning around October 21st. His fever spiked to 102°F, and he had significant choking and respiratory difficulties. To avoid hospitalization, he was given prophylactic antibiotics (Augmentin) and steroids at home. The choking symptoms have improved since then, but he still has some difficulty swallowing, which may require esophageal dilation, though the patient is reluctant to pursue this intervention.    Hydration has been an ongoing challenge, with the patient resisting increased fluid intake. Popsicles have been utilized as an alternative method to improve hydration.    Nando denies any new or worsening symptoms beyond those discussed.    TEST RESULTS:  Nando's TSH level was 8 in January 2023 and 3.1 in April 2023. He also had labs done in January 2023.      ROS:  General: -fever  Genitourinary: +urinary incontinence  Skin: +lesion- being addressed by Home Health.   Psychiatric: -sleep difficulty          Active Problem List with Overview Notes    Diagnosis Date Noted    Cardiac pacemaker in situ 05/16/2023    Hypothyroidism 08/31/2022    Delirium 08/31/2022    Alzheimer's dementia with anxiety, unspecified dementia severity, unspecified timing of dementia onset 08/31/2022    Chronic HFrEF (heart  failure with reduced ejection fraction) ICM NYHA2 08/27/2022    Nonsustained ventricular tachycardia 03/17/2022    Fall 04/04/2021    Syncope 04/04/2021    Traumatic injury to skin or subcutaneous tissue 04/04/2021    Inadequate dietary intake of protein 10/05/2019    Orthostatic hypotension     Calculus of gallbladder and bile duct with acute cholecystitis, with obstruction 09/26/2019    Acute cholecystitis 09/25/2019    Aortic atherosclerosis 09/04/2019    Inflammatory spondylopathy of cervical region 04/15/2019    Chronic, continuous use of opioids 02/22/2018    Postoperative hypothyroidism 06/02/2016    Arthropathy of cervical facet joint 11/16/2015    Facet arthropathy, cervical 10/14/2015    Myelomalacia of cervical cord 10/14/2015    Cervical stenosis of spine 10/14/2015    Anxiety 07/01/2015    Thyroid nodule 02/10/2015    Abdominal aortic aneurysm without rupture 01/13/2015    Bradycardia 04/03/2014    S/P CABG (coronary artery bypass graft) 04/03/2014    Permanent atrial fibrillation 04/02/2014    Cardiac angina 11/06/2012    CAD (coronary artery disease)      ANGINA=LOWER SS AND EPIG PAIN WITH SOB  CABG X4 1995  PCI D1 '96, SVG-D1 '96, LCX 2005  MULTIPLE PCI 9/12 (LT MAIN, LAD, D1, SVG-RCA)      Hypertension, benign     Hyperlipidemia     Chronic back pain       Recent Labs Obtained:  No visits with results within 7 Day(s) from this visit.   Latest known visit with results is:   Clinical Support on 11/12/2024   Component Date Value Ref Range Status    ICD Shock 11/12/2024 No   Final    Device Type 11/12/2024 Pacemaker   Final    RV Paccing % 11/12/2024 44  % Final       Encounter Diagnoses   Name Primary?    Alzheimer's dementia with anxiety, unspecified dementia severity, unspecified timing of dementia onset Yes    Hypertension, benign     Coronary artery disease involving native coronary artery of native heart without angina pectoris     Urinary urgency     Postoperative hypothyroidism         Orders  Placed This Encounter   Procedures    CULTURE, URINE     Standing Status:   Future     Standing Expiration Date:   3/17/2026     Order Specific Question:   Send normal result to authorizing provider's In Basket if patient is active on MyChart:     Answer:   Yes    TSH     Standing Status:   Future     Standing Expiration Date:   12/17/2025     Order Specific Question:   Send normal result to authorizing provider's In Basket if patient is active on MyChart:     Answer:   Yes    Basic Metabolic Panel     Standing Status:   Future     Standing Expiration Date:   12/17/2025     Order Specific Question:   Send normal result to authorizing provider's In Basket if patient is active on MyChart:     Answer:   Yes      Medications Ordered This Encounter   Medications    LIDOcaine-prilocaine (EMLA) cream     Sig: Apply topically daily as needed (pain). Apply to affected area     Dispense:  30 g     Refill:  5    nitroGLYCERIN (NITROSTAT) 0.4 MG SL tablet     Sig: Place 1 tablet (0.4 mg total) under the tongue every 5 (five) minutes as needed for Chest pain.     Dispense:  25 tablet     Refill:  3      Physical:  Patient seated in chair on video screen.  Soft, weak voice.  Answers simple questions.    Does not appear to be in any acute distress in particular breathing appeared normal.  Mentation slow, interactions slow.      Diagnoses and all orders for this visit:    Alzheimer's dementia with anxiety, unspecified dementia severity, unspecified timing of dementia onset  -     TSH; Future  -     Basic Metabolic Panel; Future    Hypertension, benign  -     TSH; Future  -     Basic Metabolic Panel; Future    Coronary artery disease involving native coronary artery of native heart without angina pectoris  -     TSH; Future  -     Basic Metabolic Panel; Future    Urinary urgency  -     CULTURE, URINE; Future  -     TSH; Future  -     Basic Metabolic Panel; Future    Postoperative hypothyroidism  -     TSH; Future  -     Basic  Metabolic Panel; Future    Other orders  -     LIDOcaine-prilocaine (EMLA) cream; Apply topically daily as needed (pain). Apply to affected area  -     nitroGLYCERIN (NITROSTAT) 0.4 MG SL tablet; Place 1 tablet (0.4 mg total) under the tongue every 5 (five) minutes as needed for Chest pain.           We initially reached out to the patient, family and granddaughter,  Angelica Cheng, as they needed assistance when the patient had COVID in mid October.  We reached out asking for home health assistance 21st and 22nd 2024.  My understanding is the patient has been receiving home health since around that time and today was a virtual visit to document continued need for this service.  We will update prescriptions, labs in urine.      We will intend to continue home health.         Answers submitted by the patient for this visit:  Review of Systems Questionnaire (Submitted on 12/17/2024)  activity change: Yes  unexpected weight change: No  neck pain: Yes  hearing loss: No  rhinorrhea: No  trouble swallowing: No  eye discharge: No  visual disturbance: No  chest tightness: No  wheezing: No  chest pain: No  palpitations: No  blood in stool: No  constipation: Yes  vomiting: No  diarrhea: No  polydipsia: No  polyuria: No  difficulty urinating: Yes  urgency: No  hematuria: No  joint swelling: No  arthralgias: Yes  headaches: No  weakness: Yes  confusion: Yes  dysphoric mood: No

## 2024-12-18 ENCOUNTER — PATIENT MESSAGE (OUTPATIENT)
Dept: INTERNAL MEDICINE | Facility: CLINIC | Age: 89
End: 2024-12-18
Payer: MEDICARE

## 2024-12-22 PROCEDURE — G0179 MD RECERTIFICATION HHA PT: HCPCS | Mod: ,,, | Performed by: INTERNAL MEDICINE

## 2024-12-26 ENCOUNTER — DOCUMENT SCAN (OUTPATIENT)
Dept: HOME HEALTH SERVICES | Facility: HOSPITAL | Age: 89
End: 2024-12-26
Payer: MEDICARE

## 2024-12-30 NOTE — PROGRESS NOTES
Subjective:       Patient ID: Nando Salguero is a 85 y.o. male.    Chief Complaint: Annual Exam    Patient here for annual exam.  He is attended by his granddaughter Angelica who is a 3rd year Internal Medicine resident.   He continues to have chronic severe back pain but has not escalated the use of his medication.  He has had some balance issues and they are asking for a shower chair.  He has not taken nitroglycerin in a while but would like to keep a fresh prescription and we will do some updates.  He has an esophageal stricture that was evaluated near home a but he has been hesitant to get the balloon procedure done.  His granddaughter says that at times he coughs or chokes on liquids and I am concerned if this is getting worse he may have more side effects.      Review of Systems   Constitutional: Negative for chills, fatigue and fever.   HENT: Positive for trouble swallowing. Negative for nosebleeds.    Eyes: Negative for pain and visual disturbance.   Respiratory: Negative for cough, shortness of breath and wheezing.    Cardiovascular: Negative for chest pain and palpitations.   Gastrointestinal: Negative for abdominal pain, constipation, diarrhea, nausea and vomiting.   Genitourinary: Negative for difficulty urinating and hematuria.   Musculoskeletal: Positive for arthralgias, back pain and gait problem. Negative for neck pain.   Integumentary:  Negative for rash.   Neurological: Negative for dizziness and headaches.   Hematological: Does not bruise/bleed easily.   Psychiatric/Behavioral: Negative for dysphoric mood and sleep disturbance.         Objective:      Physical Exam  Constitutional:       General: He is not in acute distress.     Appearance: He is well-developed.   HENT:      Head: Normocephalic and atraumatic.      Right Ear: Tympanic membrane, ear canal and external ear normal.      Left Ear: Tympanic membrane, ear canal and external ear normal.      Mouth/Throat:      Pharynx: No oropharyngeal  exudate or posterior oropharyngeal erythema.   Eyes:      General: No scleral icterus.     Conjunctiva/sclera: Conjunctivae normal.      Pupils: Pupils are equal, round, and reactive to light.   Neck:      Musculoskeletal: Normal range of motion and neck supple.      Thyroid: No thyromegaly.      Comments: No supraclavicular nodes palpated  Cardiovascular:      Rate and Rhythm: Normal rate and regular rhythm.      Pulses: Normal pulses.      Heart sounds: Normal heart sounds. No murmur.   Pulmonary:      Effort: Pulmonary effort is normal.      Breath sounds: Normal breath sounds. No wheezing.   Abdominal:      General: Bowel sounds are normal.      Palpations: Abdomen is soft. There is no mass.      Tenderness: There is no abdominal tenderness.   Musculoskeletal:         General: Tenderness present.      Right lower leg: No edema.      Left lower leg: No edema.   Lymphadenopathy:      Cervical: No cervical adenopathy.   Skin:     Coloration: Skin is not jaundiced or pale.   Neurological:      General: No focal deficit present.      Mental Status: He is alert and oriented to person, place, and time.   Psychiatric:         Mood and Affect: Mood normal.         Behavior: Behavior normal.         Assessment:       1. Cervical stenosis of spine    2. Coronary artery disease involving coronary bypass graft of native heart without angina pectoris    3. Gait instability    4. Routine physical examination    5. Hypertension, benign    6. Mixed hyperlipidemia    7. Thyroid nodule    8. Esophageal stricture        Plan:       Nando was seen today for annual exam.    Diagnoses and all orders for this visit:    Cervical stenosis of spine  -     BATH/SHOWER CHAIR FOR HOME USE  -     TSH; Future  -     Lipid Panel; Future  -     CBC Auto Differential; Future  -     Comprehensive Metabolic Panel; Future    Coronary artery disease involving coronary bypass graft of native heart without angina pectoris  -     BATH/SHOWER CHAIR FOR  HOME USE  -     TSH; Future  -     Lipid Panel; Future  -     CBC Auto Differential; Future  -     Comprehensive Metabolic Panel; Future    Gait instability  -     BATH/SHOWER CHAIR FOR HOME USE  -     TSH; Future  -     Lipid Panel; Future  -     CBC Auto Differential; Future  -     Comprehensive Metabolic Panel; Future    Routine physical examination  -     TSH; Future  -     Lipid Panel; Future  -     CBC Auto Differential; Future  -     Comprehensive Metabolic Panel; Future    Hypertension, benign  -     TSH; Future  -     Lipid Panel; Future  -     CBC Auto Differential; Future  -     Comprehensive Metabolic Panel; Future    Mixed hyperlipidemia  -     TSH; Future  -     Lipid Panel; Future  -     CBC Auto Differential; Future  -     Comprehensive Metabolic Panel; Future    Thyroid nodule  -     TSH; Future  -     Lipid Panel; Future  -     CBC Auto Differential; Future  -     Comprehensive Metabolic Panel; Future    Esophageal stricture    Other orders  -     metoprolol tartrate (LOPRESSOR) 25 MG tablet; Take 0.5 tablets (12.5 mg total) by mouth 2 (two) times daily.  -     lidocaine-prilocaine (EMLA) cream; Apply topically daily as needed.  -     oxyCODONE-acetaminophen (PERCOCET) 5-325 mg per tablet; Take 1 tablet by mouth every 12 (twelve) hours as needed for Pain.  -     nitroGLYCERIN (NITROSTAT) 0.4 MG SL tablet; Place 1 tablet (0.4 mg total) under the tongue every 5 (five) minutes as needed for Chest pain.           .

## 2025-01-05 DIAGNOSIS — I25.10 CORONARY ARTERY DISEASE INVOLVING NATIVE CORONARY ARTERY OF NATIVE HEART WITHOUT ANGINA PECTORIS: ICD-10-CM

## 2025-01-05 DIAGNOSIS — E78.2 MIXED HYPERLIPIDEMIA: ICD-10-CM

## 2025-01-05 DIAGNOSIS — I10 HYPERTENSION, BENIGN: ICD-10-CM

## 2025-01-05 NOTE — TELEPHONE ENCOUNTER
No care due was identified.  Health [facility] Embedded Care Due Messages. Reference number: [id number].   1/05/2025 12:34:55 AM CST

## 2025-01-06 RX ORDER — ATORVASTATIN CALCIUM 40 MG/1
40 TABLET, FILM COATED ORAL
Qty: 90 TABLET | Refills: 0 | Status: SHIPPED | OUTPATIENT
Start: 2025-01-06

## 2025-01-06 NOTE — TELEPHONE ENCOUNTER
Refill Routing Note   Medication(s) are not appropriate for processing by [facility] for the following reason(s):        Required labs outdated    [facility] action(s):  Defer             Appointments  past 12m or future 3m with PCP    Date Provider   Last Visit   12/17/2024 Rogelio Oliver MD   Next Visit   Visit date not found Rogelio Oliver MD   ED visits in past 90 days: 0        Note composed:12:16 PM 01/06/2025

## 2025-01-10 ENCOUNTER — TELEPHONE (OUTPATIENT)
Dept: INTERNAL MEDICINE | Facility: CLINIC | Age: OVER 89
End: 2025-01-10
Payer: MEDICARE

## 2025-01-10 NOTE — TELEPHONE ENCOUNTER
----- Message from Opal sent at 1/9/2025  4:06 PM CST -----  Contact: [phone](Sherry HARP)[employer]  .1MEDICALADVICE     Patient is calling for Medical Advice regarding:[employer] said pt had a spot where pt was scratching and they put maxorb and a foam over it as well.     How long has patient had these symptoms:    Pharmacy name and phone#:    Patient [facility] a call back or [facility]:call back     Comments:    Please advise patient replies from provider may take up to 48 hours.

## 2025-01-14 ENCOUNTER — TELEPHONE (OUTPATIENT)
Dept: INTERNAL MEDICINE | Facility: CLINIC | Age: OVER 89
End: 2025-01-14
Payer: MEDICARE

## 2025-01-14 NOTE — TELEPHONE ENCOUNTER
----- Message from Jonnathan sent at 1/14/2025 11:27 AM CST -----  Contact: [facility] [phone]  .1MEDICALADVICE     Patient is calling for Medical Advice regarding: Pt had a fall on yesterday and has a few marks     How long has patient had these symptoms:    Pharmacy name and phone#:    Patient [facility] a call back or [facility]:    Comments:    Please advise patient replies from provider may take up to 48 hours.

## 2025-01-14 NOTE — TELEPHONE ENCOUNTER
Spoke to Sherry and just wanted to let us know---FYI  Patient had a fall. Bump on forehead, 2 black eyes, abrasion on head  HH treating  Patient granddaughter and patient did virtual visit with ER  Patient doing fine

## 2025-01-15 NOTE — TELEPHONE ENCOUNTER
At his age and injury with bruising I would have a low threshold for an in person visit to check his head.

## 2025-01-16 ENCOUNTER — DOCUMENT SCAN (OUTPATIENT)
Dept: HOME HEALTH SERVICES | Facility: HOSPITAL | Age: OVER 89
End: 2025-01-16
Payer: MEDICARE

## 2025-01-30 ENCOUNTER — DOCUMENT SCAN (OUTPATIENT)
Dept: HOME HEALTH SERVICES | Facility: HOSPITAL | Age: OVER 89
End: 2025-01-30
Payer: MEDICARE

## 2025-01-30 DIAGNOSIS — Z00.00 ENCOUNTER FOR MEDICARE ANNUAL WELLNESS EXAM: ICD-10-CM

## 2025-02-02 ENCOUNTER — DOCUMENT SCAN (OUTPATIENT)
Dept: HOME HEALTH SERVICES | Facility: HOSPITAL | Age: OVER 89
End: 2025-02-02
Payer: MEDICARE

## 2025-02-08 NOTE — TELEPHONE ENCOUNTER
Refill Routing Note   Medication(s) are not appropriate for processing by [facility] for the following reason(s):      Required labs outdated  Required vitals outdated    [facility] action(s):  Defer Care Due:  None identified            Appointments  past 12m or future 3m with PCP    Date Provider   Last Visit   12/17/2024 Rogelio Oliver MD   Next Visit   Visit date not found Rogelio Oliver MD   ED visits in past 90 days: 0        Note composed:7:35 AM 02/08/2025

## 2025-02-08 NOTE — TELEPHONE ENCOUNTER
No care due was identified.  Health [facility] Embedded Care Due Messages. Reference number: [id number].   2/08/2025 12:25:47 AM CST

## 2025-02-09 RX ORDER — METOPROLOL SUCCINATE 25 MG/1
25 TABLET, EXTENDED RELEASE ORAL
Qty: 90 TABLET | Refills: 0 | Status: SHIPPED | OUTPATIENT
Start: 2025-02-09

## 2025-02-09 NOTE — TELEPHONE ENCOUNTER
No care due was identified.  Health [facility] Embedded Care Due Messages. Reference number: [id number].   2/09/2025 12:23:27 AM CST

## 2025-02-10 ENCOUNTER — PATIENT MESSAGE (OUTPATIENT)
Dept: INTERNAL MEDICINE | Facility: CLINIC | Age: OVER 89
End: 2025-02-10
Payer: MEDICARE

## 2025-02-10 RX ORDER — AMLODIPINE BESYLATE 5 MG/1
2.5 TABLET ORAL DAILY
Qty: 45 TABLET | Refills: 0 | Status: SHIPPED | OUTPATIENT
Start: 2025-02-10 | End: 2025-02-11

## 2025-02-10 RX ORDER — AMLODIPINE BESYLATE 5 MG/1
2.5 TABLET ORAL
Qty: 45 TABLET | Refills: 0 | OUTPATIENT
Start: 2025-02-10

## 2025-02-10 NOTE — TELEPHONE ENCOUNTER
No care due was identified.  [facility] Embedded Care Due Messages. Reference number: [id number].   2/10/2025 5:28:39 PM CST

## 2025-02-11 ENCOUNTER — CLINICAL SUPPORT (OUTPATIENT)
Dept: CARDIOLOGY | Facility: HOSPITAL | Age: OVER 89
End: 2025-02-11
Payer: MEDICARE

## 2025-02-11 ENCOUNTER — CLINICAL SUPPORT (OUTPATIENT)
Dept: CARDIOLOGY | Facility: HOSPITAL | Age: OVER 89
End: 2025-02-11
Attending: INTERNAL MEDICINE
Payer: MEDICARE

## 2025-02-11 DIAGNOSIS — Z95.0 PRESENCE OF CARDIAC PACEMAKER: ICD-10-CM

## 2025-02-11 DIAGNOSIS — E89.0 POSTOPERATIVE HYPOTHYROIDISM: ICD-10-CM

## 2025-02-11 PROCEDURE — 93296 REM INTERROG EVL PM/IDS: CPT | Performed by: INTERNAL MEDICINE

## 2025-02-11 RX ORDER — AMLODIPINE BESYLATE 2.5 MG/1
2.5 TABLET ORAL DAILY
Qty: 90 TABLET | Refills: 3 | Status: SHIPPED | OUTPATIENT
Start: 2025-02-11

## 2025-02-11 RX ORDER — FAMOTIDINE 20 MG/1
TABLET, FILM COATED ORAL
Qty: 90 TABLET | Refills: 3 | Status: SHIPPED | OUTPATIENT
Start: 2025-02-11

## 2025-02-11 RX ORDER — LEVOTHYROXINE SODIUM 125 UG/1
125 TABLET ORAL
Qty: 90 TABLET | Refills: 3 | Status: SHIPPED | OUTPATIENT
Start: 2025-02-11

## 2025-02-11 NOTE — TELEPHONE ENCOUNTER
Refill Routing Note   Medication(s) are not appropriate for processing by [facility] for the following reason(s):        Outside of protocol  Pepcid is prescribed for as needed use; ROUTE TO PCP    [facility] action(s):  Route  Approve               Appointments  past 12m or future 3m with PCP    Date Provider   Last Visit   12/17/2024 Rogelio Oliver MD   Next Visit   Visit date not found Rogelio Oliver MD   ED visits in past 90 days: 0        Note composed:3:30 AM 02/11/2025

## 2025-02-11 NOTE — TELEPHONE ENCOUNTER
No care due was identified.  [facility] Embedded Care Due Messages. Reference number: [id number].   2/11/2025 12:10:00 AM CST

## 2025-02-11 NOTE — TELEPHONE ENCOUNTER
Quick DC. Duplicate Request-  med pended in previous encounter, awaiting assessment    Refill Authorization Note   Nando Salguero  is requesting a refill authorization.  Brief Assessment and Rationale for Refill:  Quick Discontinue  Medication Therapy Plan:       Medication Reconciliation Completed:  No      Comments:     Note composed:6:09 PM 02/10/2025

## 2025-02-11 NOTE — TELEPHONE ENCOUNTER
Refill Routing Note   Medication(s) are not appropriate for processing by [facility] for the following reason(s):        Required vitals outdated    [facility] action(s):  Defer             Appointments  past 12m or future 3m with PCP    Date Provider   Last Visit   12/17/2024 Rogelio Oliver MD   Next Visit   Visit date not found Rogelio Oliver MD   ED visits in past 90 days: 0        Note composed:6:09 PM 02/10/2025

## 2025-02-17 LAB
OHS CV DC REMOTE DEVICE TYPE: NORMAL
OHS CV ICD SHOCK: NO
OHS CV RV PACING PERCENT: 43 %

## 2025-02-19 DIAGNOSIS — M46.92 INFLAMMATORY SPONDYLOPATHY OF CERVICAL REGION: ICD-10-CM

## 2025-02-19 DIAGNOSIS — M48.02 CERVICAL STENOSIS OF SPINE: Primary | ICD-10-CM

## 2025-02-19 NOTE — TELEPHONE ENCOUNTER
No care due was identified.  [facility] Embedded Care Due Messages. Reference number: [id number].   2/19/2025 5:02:55 PM CST

## 2025-02-20 RX ORDER — OXYCODONE AND ACETAMINOPHEN 5; 325 MG/1; MG/1
1 TABLET ORAL EVERY 12 HOURS PRN
Qty: 60 TABLET | Refills: 0 | Status: SHIPPED | OUTPATIENT
Start: 2025-02-20

## 2025-02-25 ENCOUNTER — EXTERNAL HOME HEALTH (OUTPATIENT)
Dept: HOME HEALTH SERVICES | Facility: HOSPITAL | Age: OVER 89
End: 2025-02-25
Payer: MEDICARE

## 2025-03-06 ENCOUNTER — DOCUMENT SCAN (OUTPATIENT)
Dept: HOME HEALTH SERVICES | Facility: HOSPITAL | Age: OVER 89
End: 2025-03-06
Payer: MEDICARE

## 2025-04-03 DIAGNOSIS — E78.2 MIXED HYPERLIPIDEMIA: ICD-10-CM

## 2025-04-03 DIAGNOSIS — I25.10 CORONARY ARTERY DISEASE INVOLVING NATIVE CORONARY ARTERY OF NATIVE HEART WITHOUT ANGINA PECTORIS: ICD-10-CM

## 2025-04-03 DIAGNOSIS — I10 HYPERTENSION, BENIGN: ICD-10-CM

## 2025-04-03 RX ORDER — ATORVASTATIN CALCIUM 40 MG/1
40 TABLET, FILM COATED ORAL
Qty: 90 TABLET | Refills: 0 | Status: SHIPPED | OUTPATIENT
Start: 2025-04-03

## 2025-04-03 NOTE — TELEPHONE ENCOUNTER
Refill Routing Note   Medication(s) are not appropriate for processing by [facility] for the following reason(s):        Required labs outdated    [facility] action(s):  Defer     Requires labs : Yes             Appointments  past 12m or future 3m with PCP    Date Provider   Last Visit   12/17/2024 Rogelio Oliver MD   Next Visit   Visit date not found Rogelio Oliver MD   ED visits in past 90 days: 0        Note composed:2:32 AM 04/03/2025

## 2025-04-03 NOTE — TELEPHONE ENCOUNTER
Care Due:                  Date            Visit Type   Department     Provider  --------------------------------------------------------------------------------                                ESTABLISHED                              PATIENT -    [facility]  Last Visit: 12-      [facility]       Rogelio Oliver  Next Visit: None Scheduled  None         None Found                                                            Last  Test          Frequency    Reason                     Performed    Due Date  --------------------------------------------------------------------------------    CMP.........  12 months..  atorvastatin.............  01- 12-    Lipid Panel.  12 months..  atorvastatin.............  12- 12-    [facility] [employer] Embedded Care Due Messages. Reference number: [id number].   4/03/2025 12:51:22 AM CDT

## 2025-05-08 RX ORDER — METOPROLOL SUCCINATE 25 MG/1
25 TABLET, EXTENDED RELEASE ORAL
Qty: 90 TABLET | Refills: 0 | Status: SHIPPED | OUTPATIENT
Start: 2025-05-08

## 2025-05-08 NOTE — TELEPHONE ENCOUNTER
No care due was identified.  [facility] Embedded Care Due Messages. Reference number: [id number].   5/08/2025 12:17:51 AM CDT

## 2025-05-08 NOTE — TELEPHONE ENCOUNTER
Refill Routing Note   Medication(s) are not appropriate for processing by [facility] for the following reason(s):        Required vitals outdated    [facility] action(s):  Defer               Appointments  past 12m or future 3m with PCP    Date Provider   Last Visit   12/17/2024 Rogelio Oliver MD   Next Visit   Visit date not found Rogelio Oliver MD   ED visits in past 90 days: 0        Note composed:5:58 AM 05/08/2025

## 2025-05-13 ENCOUNTER — CLINICAL SUPPORT (OUTPATIENT)
Dept: CARDIOLOGY | Facility: HOSPITAL | Age: OVER 89
End: 2025-05-13
Attending: INTERNAL MEDICINE
Payer: MEDICARE

## 2025-05-13 ENCOUNTER — CLINICAL SUPPORT (OUTPATIENT)
Dept: CARDIOLOGY | Facility: HOSPITAL | Age: OVER 89
End: 2025-05-13
Payer: MEDICARE

## 2025-05-13 DIAGNOSIS — Z95.0 PRESENCE OF CARDIAC PACEMAKER: ICD-10-CM

## 2025-05-13 PROCEDURE — 93296 REM INTERROG EVL PM/IDS: CPT | Mod: HCNC | Performed by: INTERNAL MEDICINE

## 2025-05-13 PROCEDURE — 93294 REM INTERROG EVL PM/LDLS PM: CPT | Mod: HCNC,,, | Performed by: INTERNAL MEDICINE

## 2025-05-29 LAB
OHS CV DC REMOTE DEVICE TYPE: NORMAL
OHS CV RV PACING PERCENT: 43 %

## 2025-05-30 RX ORDER — AMLODIPINE BESYLATE 5 MG/1
TABLET ORAL
Qty: 45 TABLET | Refills: 0 | OUTPATIENT
Start: 2025-05-30

## 2025-05-30 NOTE — TELEPHONE ENCOUNTER
No care due was identified.  Health [facility] Embedded Care Due Messages. Reference number: [id number].   5/30/2025 9:33:02 AM CDT

## 2025-05-30 NOTE — TELEPHONE ENCOUNTER
Refill Decision Note   Nando Salguero  is requesting a refill authorization.  Brief Assessment and Rationale for Refill:  Quick Discontinue     Medication Therapy Plan:  order for amlodipine 2.5 mg tablets sent to pharmacy 2/11/25      Comments:     Note composed:1:55 PM 05/30/2025             Appointments     Last Visit   12/17/2024 Rogelio Oliver MD   Next Visit   Visit date not found Rogelio Oliver MD

## 2025-06-12 ENCOUNTER — TELEPHONE (OUTPATIENT)
Dept: INTERNAL MEDICINE | Facility: CLINIC | Age: OVER 89
End: 2025-06-12
Payer: MEDICARE

## 2025-06-12 ENCOUNTER — PATIENT MESSAGE (OUTPATIENT)
Dept: INTERNAL MEDICINE | Facility: CLINIC | Age: OVER 89
End: 2025-06-12
Payer: MEDICARE

## 2025-06-12 DIAGNOSIS — I50.22 CHRONIC HFREF (HEART FAILURE WITH REDUCED EJECTION FRACTION): ICD-10-CM

## 2025-06-12 DIAGNOSIS — F02.84 ALZHEIMER'S DEMENTIA WITH ANXIETY, UNSPECIFIED DEMENTIA SEVERITY, UNSPECIFIED TIMING OF DEMENTIA ONSET: Primary | ICD-10-CM

## 2025-06-12 DIAGNOSIS — G30.9 ALZHEIMER'S DEMENTIA WITH ANXIETY, UNSPECIFIED DEMENTIA SEVERITY, UNSPECIFIED TIMING OF DEMENTIA ONSET: Primary | ICD-10-CM

## 2025-06-12 NOTE — TELEPHONE ENCOUNTER
Copied from CRM #[id number]. Topic: General Inquiry - Patient Advice  >> Jun 12, 2025 11:36 AM Reymundo wrote:  .1MEDICALADVICE     Patient is calling for Medical Advice regarding: La KAUFMAN with [facility] called to discuss homehealth services needed for patient. The location the family would like to use is [facility] in [place], [place]. She said it is important she get a call to discuss this at earliest convenience Please call La at number provided: [phone]     How long has patient had these symptoms:    Pharmacy name and phone#:    Patient wants a call back or thru [facility], provide patient's call back phone number: Call; [phone]    Comments: La Duran - [facility] Nurse     Family [facility] in [place], [place]     [phone]    Please advise patient replies from provider may take up to 48 hours.

## 2025-06-13 ENCOUNTER — TELEPHONE (OUTPATIENT)
Dept: INTERNAL MEDICINE | Facility: CLINIC | Age: OVER 89
End: 2025-06-13
Payer: MEDICARE

## 2025-06-13 NOTE — TELEPHONE ENCOUNTER
Copied from CRM #[id number]. Topic: General Inquiry - Patient Advice  >> Jun 13, 2025  8:20 AM Jonnathan wrote:  .1MEDICALADVICE     Patient is calling for Medical Advice regarding:La S with [facility] called to discuss homehealth services needed for patient. The location the family would like to use is [facility] in [place], [place]. She said it is important she get a call to discuss this at earliest convenience Please call La at number provided: [phone]     How long has patient had these symptoms:    Pharmacy name and phone#:    Patient wants a call back or thru [facility], provide patient's call back phone number:    Comments: La missed a call from [place]     Please advise patient replies from provider may take up to 48 hours.

## 2025-06-13 NOTE — TELEPHONE ENCOUNTER
"bradypnea    short of breath    Chief Complaint  Patient presents with  · Medical Eval  To ED via [facility]- family found pt unresponsive in bed and "not breathing right." On EMS arrival pt with a respiratory rate of 4- began bagging pt. Reports after bagging pt for a few minutes he became more responsive. Pt arrives to ED on room air and alert and talking with staff.     89-year-old gentleman with a history of vascular dementia, AAA, CHF, paroxysmal AFib not anticoagulated secondary to fall risk, status post permanent pacemaker here today brought in by EMS following episode of nonresponsiveness at home. History provided by family at bedside as well as EMS. Wife reports that she was called in the patient's room and the patient's stated he did not feel well. Became unresponsive. EMS was called. On their arrival patient was found to be bradypneic with respiratory rate roughly 4. Reportedly normal vitals otherwise. Had a paced rhythm. Improved with rescue breathing by bag mask ventilation. Has since been awake alert and talking. Normal blood pressures. Patient denies any acute complaints. Specifically denies chest pain, difficulty breathing. Patient is prescribed Norco but family reports patient did not receive any pain medications including Tylenol last night.    MS  they filled out a La Post while he was here with the Supportive Care team   "

## 2025-06-19 ENCOUNTER — OFFICE VISIT (OUTPATIENT)
Dept: INTERNAL MEDICINE | Facility: CLINIC | Age: OVER 89
End: 2025-06-19
Payer: MEDICARE

## 2025-06-19 VITALS — SYSTOLIC BLOOD PRESSURE: 128 MMHG | OXYGEN SATURATION: 99 % | HEART RATE: 83 BPM | DIASTOLIC BLOOD PRESSURE: 95 MMHG

## 2025-06-19 DIAGNOSIS — F02.84 ALZHEIMER'S DEMENTIA WITH ANXIETY, UNSPECIFIED DEMENTIA SEVERITY, UNSPECIFIED TIMING OF DEMENTIA ONSET: ICD-10-CM

## 2025-06-19 DIAGNOSIS — I10 HYPERTENSION, BENIGN: ICD-10-CM

## 2025-06-19 DIAGNOSIS — R06.03 RESPIRATORY DISTRESS: Primary | ICD-10-CM

## 2025-06-19 DIAGNOSIS — G30.9 ALZHEIMER'S DEMENTIA WITH ANXIETY, UNSPECIFIED DEMENTIA SEVERITY, UNSPECIFIED TIMING OF DEMENTIA ONSET: ICD-10-CM

## 2025-06-19 DIAGNOSIS — I50.22 CHRONIC HFREF (HEART FAILURE WITH REDUCED EJECTION FRACTION): ICD-10-CM

## 2025-06-19 PROBLEM — I47.29 NONSUSTAINED VENTRICULAR TACHYCARDIA: Status: RESOLVED | Noted: 2022-03-17 | Resolved: 2025-06-19

## 2025-06-19 PROCEDURE — 1126F AMNT PAIN NOTED NONE PRSNT: CPT | Mod: CPTII,HCNC,95, | Performed by: INTERNAL MEDICINE

## 2025-06-19 PROCEDURE — 1100F PTFALLS ASSESS-DOCD GE2>/YR: CPT | Mod: CPTII,HCNC,95, | Performed by: INTERNAL MEDICINE

## 2025-06-19 PROCEDURE — 1159F MED LIST DOCD IN RCRD: CPT | Mod: CPTII,HCNC,95, | Performed by: INTERNAL MEDICINE

## 2025-06-19 PROCEDURE — G2211 COMPLEX E/M VISIT ADD ON: HCPCS | Mod: HCNC,95,, | Performed by: INTERNAL MEDICINE

## 2025-06-19 PROCEDURE — 3288F FALL RISK ASSESSMENT DOCD: CPT | Mod: CPTII,HCNC,95, | Performed by: INTERNAL MEDICINE

## 2025-06-19 PROCEDURE — 98005 SYNCH AUDIO-VIDEO EST LOW 20: CPT | Mod: HCNC,95,, | Performed by: INTERNAL MEDICINE

## 2025-06-19 RX ORDER — AMLODIPINE BESYLATE 2.5 MG/1
2.5 TABLET ORAL DAILY
Qty: 90 TABLET | Refills: 3 | Status: SHIPPED | OUTPATIENT
Start: 2025-06-19

## 2025-06-19 NOTE — PROGRESS NOTES
Subjective:       Patient ID: Nando Salguero is a 89 y.o. male.    Chief Complaint: Hospital Follow Up      The patient location is: [place]  The chief complaint leading to consultation is: Hospital FOllow up    Visit type: audiovisual    Face to Face time with patient: 14 minutes  18 minutes of total time spent on the encounter, which includes face to face time and non-face to face time preparing to see the patient (eg, review of tests), Obtaining and/or reviewing separately obtained history, Documenting clinical information in the electronic or other health record, Independently interpreting results (not separately reported) and communicating results to the patient/family/caregiver, or Care coordination (not separately reported).         Each patient to whom he or she provides medical services by telemedicine is:  (1) informed of the relationship between the physician and patient and the respective role of any other health care provider with respect to management of the patient; and (2) notified that he or she may decline to receive medical services by telemedicine and may withdraw from such care at any time.    Notes:     HPI: Pt seen with his granddaughter physician Angelica Cheng. Last week, pt c/o of SOB, difficulty speaking, not responding in his usual manner.   EMS came out to the house. Pt's wife was present but granddaughter was not present in the house. When she got in, pt was on stretcher and was being bagged. Slow HR and low resp rate.   Once in ER, he totally woke up. Labs, pacer were ok.  After a short time in the emergency room when she arrived the patient totally woke up and was acting normally.  They observed briefly and then let him go home.    Basically patient's granddaughter wanted to review this.  The patient is a DNR and has a LaPOST completed.  They have a pulse ox at home.  The only thing we could determine was maybe he had a mucus plug or some aspiration or some temporary respiratory issue  that was treated by bagging.  The fact that everything seemed to be fairly stable in the emergency department and then corrected itself makes us think this is likely not a heart attack or stroke or major cardiopulmonary issue in patient is stable back at home for a few days.  The home health company they originally used does not take his insurance so they asked me to submitted again under a different company      Review of Systems   Constitutional:  Positive for activity change. Negative for unexpected weight change.   HENT:  Negative for hearing loss, rhinorrhea and trouble swallowing.    Eyes:  Negative for discharge and visual disturbance.   Respiratory:  Negative for chest tightness and wheezing.    Cardiovascular:  Negative for chest pain and palpitations.   Gastrointestinal:  Positive for constipation. Negative for blood in stool, diarrhea and vomiting.   Endocrine: Negative for polydipsia and polyuria.   Genitourinary:  Negative for difficulty urinating, hematuria and urgency.   Musculoskeletal:  Positive for neck pain. Negative for arthralgias and joint swelling.   Neurological:  Positive for weakness. Negative for headaches.   Psychiatric/Behavioral:  Positive for confusion. Negative for dysphoric mood.          Objective:      Physical Exam  Constitutional:       Appearance: Normal appearance.      Comments: Patient awake and alert on the video call.  He does answer some simple questions but his granddaughter who is a physician asks and answers most of the questions.   Pulmonary:      Effort: No respiratory distress.   Neurological:      Mental Status: He is alert.   Psychiatric:         Mood and Affect: Mood normal.         Behavior: Behavior normal.         Assessment:       Problem List Items Addressed This Visit          Neuro    Alzheimer's dementia with anxiety, unspecified dementia severity, unspecified timing of dementia onset    Relevant Orders    Ambulatory referral/consult to Home Health        "Cardiac/Vascular    Hypertension, benign    Relevant Medications    amLODIPine (NORVASC) 2.5 MG tablet    Chronic HFrEF (heart failure with reduced ejection fraction) ICM NYHA2    Relevant Orders    Ambulatory referral/consult to Home Health     Other Visit Diagnoses         Respiratory distress    -  Primary    This prompted EMS and ER visit.  See information above-resolved in returned to baseline            Plan:       Nando was seen today for hospital follow up.    Diagnoses and all orders for this visit:    Respiratory distress  Comments:  This prompted EMS and ER visit.  See information above-resolved in returned to baseline    Hypertension, benign  -     amLODIPine (NORVASC) 2.5 MG tablet; Take 1 tablet (2.5 mg total) by mouth once daily.    Alzheimer's dementia with anxiety, unspecified dementia severity, unspecified timing of dementia onset  -     Ambulatory referral/consult to Home Health; Future  Continue current medication-monitor for any acute changes  Chronic HFrEF (heart failure with reduced ejection fraction)  -     Ambulatory referral/consult to Home Health; Future  Continue current medication.  Continue to monitor vitals       As this patient's PCP, I am actively managing and/or treating their chronic medical conditions including dementia, heart failure and have been for at least 1 year. This includes, but is not limited to, medication management, coordination of care, documentation review from their specialists and labs/imaging review where pertinent.        [facility] in [place], [place]  Phone [phone]    Portions of this note may have been created with voice recognition software. Occasional "wrong-word" or "sound-a-like" substitutions may have occurred due to the inherent limitations of voice recognition software. Please, read the note carefully and recognize, using context, where substitutions have occurred.  "

## 2025-06-27 ENCOUNTER — TELEPHONE (OUTPATIENT)
Dept: INTERNAL MEDICINE | Facility: CLINIC | Age: OVER 89
End: 2025-06-27
Payer: MEDICARE

## 2025-06-27 NOTE — TELEPHONE ENCOUNTER
Copied from CRM #[id number]. Topic: General Inquiry - Patient Advice  >> Jun 27, 2025  8:30 AM Kerry wrote:  .1MEDICALADVICE     Patient is calling for Medical Advice regarding:Good Morning, [facility] called need office notes for 06 19 2025. Please fax over to     How long has patient had these symptoms:    Pharmacy name and phone#:        Comments:Please call and advise     Please advise patient replies from provider may take up to 48 hours.

## 2025-06-28 DIAGNOSIS — E78.2 MIXED HYPERLIPIDEMIA: ICD-10-CM

## 2025-06-28 DIAGNOSIS — I10 HYPERTENSION, BENIGN: ICD-10-CM

## 2025-06-28 DIAGNOSIS — I25.10 CORONARY ARTERY DISEASE INVOLVING NATIVE CORONARY ARTERY OF NATIVE HEART WITHOUT ANGINA PECTORIS: ICD-10-CM

## 2025-06-28 NOTE — TELEPHONE ENCOUNTER
Care Due:                  Date            Visit Type   Department     Provider  --------------------------------------------------------------------------------                                ESTABLISHED                              PATIENT -    [facility]  Last Visit: 06-      [facility]       Rogelio Oliver  Next Visit: None Scheduled  None         None Found                                                            Last  Test          Frequency    Reason                     Performed    Due Date  --------------------------------------------------------------------------------    Lipid Panel.  12 months..  atorvastatin.............  12- 12-    [facility] Embedded Care Due Messages. Reference number: [id number].   6/28/2025 12:23:38 AM CDT

## 2025-06-29 RX ORDER — ATORVASTATIN CALCIUM 40 MG/1
40 TABLET, FILM COATED ORAL DAILY
Qty: 90 TABLET | Refills: 0 | Status: SHIPPED | OUTPATIENT
Start: 2025-06-29

## 2025-06-29 NOTE — TELEPHONE ENCOUNTER
Refill Routing Note   Medication(s) are not appropriate for processing by [facility] for the following reason(s):        Required labs outdated    [facility] action(s):  Defer     Requires labs : Yes    Medication Therapy Plan: Lab(s) recommended: Lipid Panel      Appointments  past 12m or future 3m with PCP    Date Provider   Last Visit   6/19/2025 Rogelio Oliver MD   Next Visit   Visit date not found Rogelio Oliver MD   ED visits in past 90 days: 2        Note composed:7:03 PM 06/28/2025

## 2025-07-01 ENCOUNTER — TELEPHONE (OUTPATIENT)
Dept: INTERNAL MEDICINE | Facility: CLINIC | Age: OVER 89
End: 2025-07-01
Payer: MEDICARE

## 2025-07-01 DIAGNOSIS — B37.9 YEAST INFECTION: Primary | ICD-10-CM

## 2025-07-01 NOTE — TELEPHONE ENCOUNTER
Pts Granddaughter states Pt is still taking lexapro and still using the topical and she also states She bought (mycanal Powder  ---------------------------------------------------------    First call made was to [phone]     Spoke to Nurse sumi and she didn't know if the Pt was taking Lexapro and wanted to call the Pts granddaughter to ask her but also wanted the Pts number as she was in another Pts chart and could not axes the Pts Chart I told the nurse I would call them and ask about the Pts medicine

## 2025-07-01 NOTE — TELEPHONE ENCOUNTER
Copied from CRM #[id number]. Topic: General Inquiry - Patient Advice  >> Jul 1, 2025  9:52 AM Ria wrote:  Patient is calling for Medical Advice regarding:Inching     How long has patient had these symptoms:Inching on private part     Patient wants a call back or thru [facility]:Call back [phone] nurse     Comments:Pt nurse states pt has a lot of uncountable inching on private part. Nurse states can do send over diflucan medication to pharmacy     [facility]/pharmacy #[id number] - [place] - [address] AT [facility]  [address] [place] [address]  Phone: [phone] Fax: [phone]

## 2025-07-01 NOTE — TELEPHONE ENCOUNTER
Spoke to Mihaela (nurse for [facility]) states that she saw patient and doing well, vitals good, except he is itching a lot around penis, had rash but has been putting anti-itch powder and rash is better but still itching---nurse and grand-daughter are thinking that he may have a yeast infection, patient is not circumcised  They are wanting to know if you can call in [employer] to the [facility]

## 2025-07-01 NOTE — TELEPHONE ENCOUNTER
Is patient is still taking Lexapro?  I get an alert when I go to prescribe Diflucan with Lexapro that says there is a moderate interaction risk.  If he is still taking the medicine it may be best to continue using the topical.  If he is not using the Lexapro let me know and I can get it sent in.

## 2025-08-06 RX ORDER — METOPROLOL SUCCINATE 25 MG/1
25 TABLET, EXTENDED RELEASE ORAL
Qty: 90 TABLET | Refills: 3 | Status: SHIPPED | OUTPATIENT
Start: 2025-08-06

## 2025-08-06 NOTE — TELEPHONE ENCOUNTER
Refill Routing Note   Medication(s) are not appropriate for processing by [facility] for the following reason(s):        Required vitals abnormal    [facility] action(s):  Defer             Appointments  past 12m or future 3m with PCP    Date Provider   Last Visit   6/19/2025 Rogelio Oliver MD   Next Visit   Visit date not found Rogelio Oliver MD   ED visits in past 90 days: 2        Note composed:7:49 AM 08/06/2025

## 2025-08-06 NOTE — TELEPHONE ENCOUNTER
No care due was identified.  [facility] Embedded Care Due Messages. Reference number: [id number].   8/06/2025 12:16:44 AM CDT

## 2025-08-12 ENCOUNTER — CLINICAL SUPPORT (OUTPATIENT)
Dept: CARDIOLOGY | Facility: HOSPITAL | Age: OVER 89
End: 2025-08-12
Attending: INTERNAL MEDICINE
Payer: MEDICARE

## 2025-08-12 ENCOUNTER — CLINICAL SUPPORT (OUTPATIENT)
Dept: CARDIOLOGY | Facility: HOSPITAL | Age: OVER 89
End: 2025-08-12
Payer: MEDICARE

## 2025-08-12 DIAGNOSIS — Z95.0 PRESENCE OF CARDIAC PACEMAKER: ICD-10-CM

## 2025-08-12 PROCEDURE — 93296 REM INTERROG EVL PM/IDS: CPT | Performed by: INTERNAL MEDICINE

## 2025-08-12 PROCEDURE — 93294 REM INTERROG EVL PM/LDLS PM: CPT | Mod: ,,, | Performed by: INTERNAL MEDICINE

## 2025-08-14 LAB
OHS CV DC REMOTE DEVICE TYPE: NORMAL
OHS CV RV PACING PERCENT: 51 %

## 2025-09-04 ENCOUNTER — TELEPHONE (OUTPATIENT)
Dept: PHARMACY | Facility: CLINIC | Age: OVER 89
End: 2025-09-04
Payer: MEDICARE

## (undated) DEVICE — PACK PACER PERMANENT

## (undated) DEVICE — SHEATH SAFE ULTRA 9FR

## (undated) DEVICE — DRESSING MEPILEX FLEX 4X4IN

## (undated) DEVICE — ADHESIVE DERMABOND ADVANCED

## (undated) DEVICE — Device

## (undated) DEVICE — SLING SWATHE UNIVERSAL FOAM

## (undated) DEVICE — SHEATH SAFESHEATH II ULTRA 6FR

## (undated) DEVICE — DRESSING AQUACEL FOAM 3 X 3

## (undated) DEVICE — ELECTRODE REM PLYHSV RETURN 9

## (undated) DEVICE — DRESSING AQUACEL AG ADV 3.5X6

## (undated) DEVICE — KIT SELECTRA ACCESSORY

## (undated) DEVICE — DRAPE INCISE IOBAN 2 23X17IN

## (undated) DEVICE — PAD DEFIB CADENCE ADULT R2